# Patient Record
Sex: FEMALE | Race: WHITE | NOT HISPANIC OR LATINO | Employment: OTHER | URBAN - METROPOLITAN AREA
[De-identification: names, ages, dates, MRNs, and addresses within clinical notes are randomized per-mention and may not be internally consistent; named-entity substitution may affect disease eponyms.]

---

## 2018-06-11 ENCOUNTER — OFFICE VISIT (OUTPATIENT)
Dept: SPINE | Facility: CLINIC | Age: 48
End: 2018-06-11
Payer: MEDICARE

## 2018-06-11 VITALS
DIASTOLIC BLOOD PRESSURE: 68 MMHG | HEIGHT: 60 IN | BODY MASS INDEX: 22.19 KG/M2 | HEART RATE: 74 BPM | SYSTOLIC BLOOD PRESSURE: 100 MMHG | WEIGHT: 113 LBS

## 2018-06-11 DIAGNOSIS — M54.50 CHRONIC BILATERAL LOW BACK PAIN WITHOUT SCIATICA: Primary | ICD-10-CM

## 2018-06-11 DIAGNOSIS — M41.25 OTHER IDIOPATHIC SCOLIOSIS, THORACOLUMBAR REGION: ICD-10-CM

## 2018-06-11 DIAGNOSIS — G89.29 CHRONIC BILATERAL LOW BACK PAIN WITHOUT SCIATICA: Primary | ICD-10-CM

## 2018-06-11 PROCEDURE — 99204 OFFICE O/P NEW MOD 45 MIN: CPT | Mod: ,,, | Performed by: PHYSICAL MEDICINE & REHABILITATION

## 2018-06-11 RX ORDER — HYDROCODONE BITARTRATE AND ACETAMINOPHEN 10; 325 MG/1; MG/1
1 TABLET ORAL EVERY 8 HOURS PRN
COMMUNITY
End: 2018-06-15

## 2018-06-11 RX ORDER — TIZANIDINE 4 MG/1
4 TABLET ORAL EVERY 8 HOURS
COMMUNITY
End: 2018-06-15

## 2018-06-11 NOTE — PROGRESS NOTES
Subjective:       Patient ID: Alisson Eldridge is a 47 y.o. female.    Chief Complaint: Back Pain (Lumbar and thoracic. Prolotherapy Dr. Damion Serna, and epidurals by a doc in )    This is a 47-year-old woman who has no primary care physician in no significant past medical history.  She does however have a history of low back pain going back to childhood.  She has known scoliosis and I get the impression that she was wearing a brace up until she was about 20 years old.  Her current complaint is of primarily right-sided low back pain at the lumbosacral junction.  She has no vielka radicular symptoms no weakness no bowel or bladder dysfunction fever chills sweats or unexpected weight loss.  Review the years she has had physical therapy and various epidural steroid type injections none of which provided any lasting relief.  She saw Dr. Crespo in October of last year who discussed surgical options with her but ultimately referred her to Dr. Dominick Tony who is a pain management specialist.  I reviewed her drug database and I see that her last prescription for Norco was written by Dr. Tony on 4/15/2018.  Patient states that at its worst her pain is 8 out of 10 in intensity.      Review of Systems   Constitutional: Negative for chills, diaphoresis, fatigue, fever and unexpected weight change.   HENT: Negative for trouble swallowing.    Eyes: Negative for visual disturbance.   Respiratory: Negative for shortness of breath.    Cardiovascular: Negative for chest pain.   Gastrointestinal: Negative for abdominal pain, constipation, nausea and vomiting.   Genitourinary: Negative for difficulty urinating.   Musculoskeletal: Negative for arthralgias, back pain, gait problem, joint swelling, myalgias, neck pain and neck stiffness.   Neurological: Negative for dizziness, speech difficulty, weakness, light-headedness, numbness and headaches.       Objective:      Physical Exam   Constitutional: She is oriented to person, place,  and time. She appears well-developed and well-nourished.   Neurological: She is alert and oriented to person, place, and time.   She is awake and in no acute distress  Mild tenderness to palpation in the lumbar paraspinous musculature.  No external lesions or palpable masses  She can forward flex to about 90° before she complains of pain at the lumbosacral junction.  Extension causes discomfort at 15° at the lumbosacral junction.  He can heel and toe walk normally  Deep tendon reflexes are +1 at both knees and both ankles  Strength is normal in both lower extremities  X-rays of the lumbar spine done last year show rotation and scoliosis of the thoracic and lumbar spine.  MRI of the thoracic and lumbar spine show mild degenerative changes       Assessment:       1. Chronic bilateral low back pain without sciatica    2. Other idiopathic scoliosis, thoracolumbar region        Plan:         she has chronic back pain.  She has a nonfocal examination from a neurological standpoint and no historical red flags.  Obviously spinal surgery for her is elective.  She needs make that decision on her own whether she can live with her current level of pain or take the risk associated with surgical intervention.  Regardless I think she is better served to have a second surgical opinion.  I will refer her to Dr. Hastings

## 2018-06-15 ENCOUNTER — OFFICE VISIT (OUTPATIENT)
Dept: FAMILY MEDICINE | Facility: CLINIC | Age: 48
End: 2018-06-15
Payer: MEDICARE

## 2018-06-15 ENCOUNTER — DOCUMENTATION ONLY (OUTPATIENT)
Dept: FAMILY MEDICINE | Facility: CLINIC | Age: 48
End: 2018-06-15

## 2018-06-15 ENCOUNTER — HOSPITAL ENCOUNTER (OUTPATIENT)
Dept: RADIOLOGY | Facility: CLINIC | Age: 48
Discharge: HOME | End: 2018-06-15
Attending: PHYSICIAN ASSISTANT
Payer: MEDICARE

## 2018-06-15 ENCOUNTER — TELEPHONE (OUTPATIENT)
Dept: FAMILY MEDICINE | Facility: CLINIC | Age: 48
End: 2018-06-15

## 2018-06-15 VITALS
BODY MASS INDEX: 21.17 KG/M2 | SYSTOLIC BLOOD PRESSURE: 112 MMHG | HEIGHT: 60 IN | HEART RATE: 72 BPM | OXYGEN SATURATION: 99 % | TEMPERATURE: 98 F | WEIGHT: 107.81 LBS | DIASTOLIC BLOOD PRESSURE: 74 MMHG

## 2018-06-15 DIAGNOSIS — N64.4 BREAST PAIN: ICD-10-CM

## 2018-06-15 DIAGNOSIS — F17.200 SMOKER: ICD-10-CM

## 2018-06-15 DIAGNOSIS — Z86.79 HISTORY OF CHRONIC CHF: ICD-10-CM

## 2018-06-15 DIAGNOSIS — R09.89 CHRONIC SINUS COMPLAINTS: Primary | ICD-10-CM

## 2018-06-15 DIAGNOSIS — Z77.120 MOLD EXPOSURE: ICD-10-CM

## 2018-06-15 DIAGNOSIS — R09.89 CHRONIC SINUS COMPLAINTS: ICD-10-CM

## 2018-06-15 DIAGNOSIS — R63.6 UNDERWEIGHT: ICD-10-CM

## 2018-06-15 DIAGNOSIS — N63.0 BREAST MASS: ICD-10-CM

## 2018-06-15 DIAGNOSIS — R05.3 CHRONIC COUGH: ICD-10-CM

## 2018-06-15 PROCEDURE — 70220 X-RAY EXAM OF SINUSES: CPT | Mod: 26,,, | Performed by: RADIOLOGY

## 2018-06-15 PROCEDURE — 99204 OFFICE O/P NEW MOD 45 MIN: CPT | Mod: PBBFAC,PO,25 | Performed by: PHYSICIAN ASSISTANT

## 2018-06-15 PROCEDURE — 99205 OFFICE O/P NEW HI 60 MIN: CPT | Mod: S$PBB,,, | Performed by: PHYSICIAN ASSISTANT

## 2018-06-15 PROCEDURE — 70220 X-RAY EXAM OF SINUSES: CPT | Mod: TC,FY,PO

## 2018-06-15 PROCEDURE — 71046 X-RAY EXAM CHEST 2 VIEWS: CPT | Mod: TC,FY,PO

## 2018-06-15 PROCEDURE — 71046 X-RAY EXAM CHEST 2 VIEWS: CPT | Mod: 26,,, | Performed by: RADIOLOGY

## 2018-06-15 PROCEDURE — 99999 PR PBB SHADOW E&M-NEW PATIENT-LVL IV: CPT | Mod: PBBFAC,,, | Performed by: PHYSICIAN ASSISTANT

## 2018-06-15 RX ORDER — DOXYCYCLINE HYCLATE 100 MG
100 TABLET ORAL 2 TIMES DAILY
Qty: 20 TABLET | Refills: 0 | Status: SHIPPED | OUTPATIENT
Start: 2018-06-15 | End: 2018-07-06

## 2018-06-15 NOTE — TELEPHONE ENCOUNTER
Spoke to patient.  Patient was seen today in clinic patient states she forgot to tell you that she has been dizzy for the past week

## 2018-06-15 NOTE — TELEPHONE ENCOUNTER
We will follow-up with labs and see if there is a cause in the labs. Otherwise, she will need to be seen again to discuss.

## 2018-06-15 NOTE — PROGRESS NOTES
Pre-Visit Chart Review  For Appointment Scheduled on 06/15/18    Health Maintenance Due   Topic Date Due    Lipid Panel  1969    TETANUS VACCINE  08/20/1987    Pneumococcal PPSV23 (Medium Risk) (1) 08/20/1987    Pap Smear with HPV Cotest  08/20/1990    Mammogram  08/20/2009

## 2018-06-15 NOTE — PROGRESS NOTES
"Subjective:       Patient ID: Alisson Eldridge is a 47 y.o. female.    Chief Complaint: Sinus Problem    HPI   Patient is a 47 year old  female presenting to the clinic as a new patient with several issues. Patient has not seen a provider in several years (2010). She is a poor historian & her signficant other frequently speaks over her and for her. She did see Dr. Long last week for chronic low back pain that she reports is from "scoliosis and T12 inversion." She was referred to neurosurgeon for further evaluation.     Today, patient with complaint of chronic sinus issues >1 year. She has had waxing and waning headaches. She has been taking mucinex and benadryl without much relief. She feels left maxillary pressure & is having severe congestion in L nostril. She also endorses cough with phlegm production associated with hoarsness, SOB, wheezing. She reports subjective fever last night. She does report exposure to mold in their apartment.    Patient also complains of bilateral breast masses that she reports have been there for years, but have never been evaluated. She does report distant history of calcifications in her breasts. Her breasts are painful at times.   Review of Systems   Constitutional: Negative for activity change, appetite change, chills, diaphoresis, fatigue and fever.   HENT: Positive for congestion, sinus pain, sinus pressure and sore throat. Negative for postnasal drip and rhinorrhea.         Hoarseness   Respiratory: Positive for cough and shortness of breath. Negative for wheezing.    Cardiovascular: Negative.  Negative for chest pain.   Gastrointestinal: Negative for abdominal pain, blood in stool, constipation, diarrhea, nausea and vomiting.   Genitourinary: Negative for dysuria, frequency, hematuria and urgency.        Breast pain, breast masses   Musculoskeletal: Negative.    Skin: Negative.  Negative for color change and rash.   Neurological: Negative for dizziness and syncope. "   Psychiatric/Behavioral: Negative for agitation, behavioral problems and confusion.       Objective:      Physical Exam   Constitutional: Vital signs are normal. She appears well-developed and well-nourished. No distress.   HENT:   Head: Normocephalic and atraumatic.   Right Ear: Hearing, tympanic membrane, external ear and ear canal normal.   Left Ear: Hearing, tympanic membrane, external ear and ear canal normal.   Nose: Mucosal edema and rhinorrhea present. Right sinus exhibits maxillary sinus tenderness and frontal sinus tenderness. Left sinus exhibits maxillary sinus tenderness and frontal sinus tenderness.   Mouth/Throat: Uvula is midline and mucous membranes are normal. Posterior oropharyngeal erythema present.   Cardiovascular: Normal rate, regular rhythm, S1 normal, S2 normal and normal heart sounds.  Exam reveals no gallop.    No murmur heard.  Pulses:       Radial pulses are 2+ on the right side, and 2+ on the left side.   Pulmonary/Chest: Effort normal and breath sounds normal. No respiratory distress. She has no wheezes. She has no rhonchi.       Lymphadenopathy:        Head (right side): Tonsillar adenopathy present. No submental, no submandibular, no preauricular, no posterior auricular and no occipital adenopathy present.        Head (left side): Tonsillar adenopathy present. No submandibular, no preauricular, no posterior auricular and no occipital adenopathy present.   Skin: Skin is warm and dry. She is not diaphoretic.   Appropriate skin turgor   Psychiatric: Her behavior is normal. Judgment and thought content normal. Her mood appears anxious. Her speech is rapid and/or pressured. Cognition and memory are normal.       Assessment:       1. Chronic sinus complaints    2. Chronic cough    3. Mold exposure    4. History of chronic CHF    5. Underweight    6. Screening cholesterol level    7. Breast mass    8. Breast pain    9. Smoker        Plan:       Alisson was seen today for sinus  problem.    Diagnoses and all orders for this visit:    Chronic sinus complaints  -     X-Ray Sinuses Min 3 Views; Future    Chronic cough  -     X-Ray Chest PA And Lateral; Future    Mold exposure  -     X-Ray Chest PA And Lateral; Future  -     X-Ray Sinuses Min 3 Views; Future    History of chronic CHF  -     Brain natriuretic peptide; Future    Underweight  -     CBC auto differential; Future  -     Comprehensive metabolic panel; Future    Breast mass  -     Mammo Digital Diagnostic Bilateral; Future  -     US Breast Bilateral Limited; Future    Breast pain  -     Mammo Digital Diagnostic Bilateral; Future  -     US Breast Bilateral Limited; Future    Smoker

## 2018-06-15 NOTE — TELEPHONE ENCOUNTER
----- Message from Jeff Jin sent at 6/15/2018  2:02 PM CDT -----  Contact: self   Patient wanted to also inform nurse that she has been experiencing some dizzy spells,  please call back to discuss at [phone] (home)

## 2018-06-19 ENCOUNTER — TELEPHONE (OUTPATIENT)
Dept: FAMILY MEDICINE | Facility: CLINIC | Age: 48
End: 2018-06-19

## 2018-06-19 ENCOUNTER — HOSPITAL ENCOUNTER (OUTPATIENT)
Dept: RADIOLOGY | Facility: HOSPITAL | Age: 48
Discharge: HOME | End: 2018-06-19
Attending: PHYSICIAN ASSISTANT
Payer: MEDICARE

## 2018-06-19 DIAGNOSIS — N63.0 BREAST MASS: ICD-10-CM

## 2018-06-19 DIAGNOSIS — D64.9 ANEMIA, UNSPECIFIED TYPE: Primary | ICD-10-CM

## 2018-06-19 DIAGNOSIS — N64.4 BREAST PAIN: ICD-10-CM

## 2018-06-19 DIAGNOSIS — R42 DIZZINESS: ICD-10-CM

## 2018-06-19 PROCEDURE — 77062 BREAST TOMOSYNTHESIS BI: CPT | Mod: 26,,, | Performed by: RADIOLOGY

## 2018-06-19 PROCEDURE — 76642 ULTRASOUND BREAST LIMITED: CPT | Mod: 26,50,, | Performed by: RADIOLOGY

## 2018-06-19 PROCEDURE — 77066 DX MAMMO INCL CAD BI: CPT | Mod: 26,,, | Performed by: RADIOLOGY

## 2018-06-19 PROCEDURE — 76642 ULTRASOUND BREAST LIMITED: CPT | Mod: TC,50

## 2018-06-19 PROCEDURE — 77066 DX MAMMO INCL CAD BI: CPT | Mod: TC

## 2018-06-19 NOTE — TELEPHONE ENCOUNTER
----- Message from KAROL Dos Santos sent at 6/19/2018  9:01 AM CDT -----  Electrolytes normal. Lab shows no evidence of fluid overload.   She is slightly anemic. I suggest she come back in for further labs- iron studies, urinalysis, hemoccult x 3.

## 2018-06-20 ENCOUNTER — TELEPHONE (OUTPATIENT)
Dept: FAMILY MEDICINE | Facility: CLINIC | Age: 48
End: 2018-06-20

## 2018-06-20 RX ORDER — METHYLPREDNISOLONE 4 MG/1
TABLET ORAL
Qty: 1 PACKAGE | Refills: 0 | Status: SHIPPED | OUTPATIENT
Start: 2018-06-20 | End: 2018-07-06

## 2018-06-20 NOTE — TELEPHONE ENCOUNTER
She needs to complete antibitoic. I would like her to add medrol dose pack as directed. She also needs to start mucinex twice daily with increased water intake. Schedule f/u in 2 weeks.

## 2018-06-20 NOTE — TELEPHONE ENCOUNTER
----- Message from KAROL Dos Santos sent at 6/20/2018  8:20 AM CDT -----  Your mammogram is normal.   Repeat your mammogram yearly.

## 2018-06-23 ENCOUNTER — TELEPHONE (OUTPATIENT)
Dept: FAMILY MEDICINE | Facility: CLINIC | Age: 48
End: 2018-06-23

## 2018-06-23 NOTE — TELEPHONE ENCOUNTER
----- Message from Florencia Hamilton RN sent at 6/21/2018  5:26 PM CDT -----  Contact: CORINNE LOPEZ [[id number]]      ----- Message -----  From: Dash Patrick  Sent: 6/21/2018   4:12 PM  To: Julien Condon Staff ([facility])      Name of Who is Calling: CORINNE LOPEZ [[id number]]      What is the request in detail: Patient would like to speak with staff in regards to hot flash medication is not working. Please advise on what to do      Can the clinic reply by [facility]: yes      What Number to Call Back if not in [facility]: [phone]

## 2018-06-25 ENCOUNTER — LAB VISIT (OUTPATIENT)
Dept: LAB | Facility: HOSPITAL | Age: 48
End: 2018-06-25
Attending: PHYSICIAN ASSISTANT
Payer: MEDICARE

## 2018-06-25 DIAGNOSIS — R42 DIZZINESS: ICD-10-CM

## 2018-06-25 DIAGNOSIS — D64.9 ANEMIA, UNSPECIFIED TYPE: ICD-10-CM

## 2018-06-25 LAB
FERRITIN SERPL-MCNC: 8 NG/ML
IRON SERPL-MCNC: 65 UG/DL
SATURATED IRON: 13 %
TOTAL IRON BINDING CAPACITY: 485 UG/DL
TRANSFERRIN SERPL-MCNC: 328 MG/DL

## 2018-06-25 PROCEDURE — 83540 ASSAY OF IRON: CPT

## 2018-06-25 PROCEDURE — 82728 ASSAY OF FERRITIN: CPT

## 2018-06-25 PROCEDURE — 36415 COLL VENOUS BLD VENIPUNCTURE: CPT | Mod: PO

## 2018-06-25 NOTE — TELEPHONE ENCOUNTER
Patient is complaining of hot flashes since last week. Patient states she has one day left of steroids. Patient will complete. Notified patient if she is still have hot flashes after completing she will need appointment to be seen patient states understanding

## 2018-06-26 DIAGNOSIS — D50.9 IRON DEFICIENCY ANEMIA, UNSPECIFIED IRON DEFICIENCY ANEMIA TYPE: Primary | ICD-10-CM

## 2018-07-05 ENCOUNTER — DOCUMENTATION ONLY (OUTPATIENT)
Dept: FAMILY MEDICINE | Facility: CLINIC | Age: 48
End: 2018-07-05

## 2018-07-06 ENCOUNTER — OFFICE VISIT (OUTPATIENT)
Dept: FAMILY MEDICINE | Facility: CLINIC | Age: 48
End: 2018-07-06
Payer: MEDICARE

## 2018-07-06 VITALS
WEIGHT: 104.75 LBS | SYSTOLIC BLOOD PRESSURE: 114 MMHG | TEMPERATURE: 98 F | DIASTOLIC BLOOD PRESSURE: 70 MMHG | HEIGHT: 60 IN | HEART RATE: 73 BPM | BODY MASS INDEX: 20.56 KG/M2

## 2018-07-06 DIAGNOSIS — J32.9 CHRONIC CONGESTION OF PARANASAL SINUS: Primary | ICD-10-CM

## 2018-07-06 DIAGNOSIS — Z12.4 CERVICAL CANCER SCREENING: ICD-10-CM

## 2018-07-06 DIAGNOSIS — Z87.81 HISTORY OF CLOSED FRACTURE OF NASAL BONES: ICD-10-CM

## 2018-07-06 DIAGNOSIS — R09.82 POST-NASAL DRIP: ICD-10-CM

## 2018-07-06 LAB
CTP QC/QA: YES
FECAL OCCULT BLOOD, POC: NEGATIVE

## 2018-07-06 PROCEDURE — 99214 OFFICE O/P EST MOD 30 MIN: CPT | Mod: PBBFAC,PO | Performed by: PHYSICIAN ASSISTANT

## 2018-07-06 PROCEDURE — 99999 PR PBB SHADOW E&M-EST. PATIENT-LVL IV: CPT | Mod: PBBFAC,,, | Performed by: PHYSICIAN ASSISTANT

## 2018-07-06 PROCEDURE — 99214 OFFICE O/P EST MOD 30 MIN: CPT | Mod: S$PBB,,, | Performed by: PHYSICIAN ASSISTANT

## 2018-07-06 RX ORDER — IPRATROPIUM BROMIDE 42 UG/1
2 SPRAY, METERED NASAL 4 TIMES DAILY
Qty: 15 ML | Refills: 5 | Status: SHIPPED | OUTPATIENT
Start: 2018-07-06 | End: 2018-08-15 | Stop reason: ALTCHOICE

## 2018-07-06 NOTE — PROGRESS NOTES
"Subjective:       Patient ID: Alisson Eldridge is a 47 y.o. female.    Chief Complaint: Follow-up    HPI   Patient is a 47 year old  female presenting to the clinic for follow-up.  1) Chronic sinus congestion- s/p doxycycline & medrol dose pack without improvement. She has been using mucinex for a year without any relief. She reports history of left nasal fracture. She also endorses chronic PND. Has not tried any nasal sprays.  2) Anemia- looks to be SHANELL; Started oral ferrous sulfate 325mg daily with vitamin C; states she cannnot tolerate twice daily dosing 2/2 headache; f/u labs in 3 months scheduled  3) Chronic low back pain- reports history of "T12 inversion" & scoliosis. Most recently evaluated via Dr. Long whom referred to neurosurgery (this was never set up). She reports history of going to pain management (Dr. Tony) whom has tried injections previously.  Review of Systems   Constitutional: Negative for activity change, appetite change, chills, diaphoresis, fatigue and fever.   HENT: Positive for postnasal drip, sinus pain and sinus pressure. Negative for congestion and rhinorrhea.    Respiratory: Negative.  Negative for cough, shortness of breath, wheezing and stridor.    Cardiovascular: Negative.  Negative for chest pain, palpitations and leg swelling.   Gastrointestinal: Negative for abdominal pain, blood in stool, constipation, diarrhea, nausea and vomiting.   Genitourinary: Negative for dysuria, frequency, hematuria and urgency.   Musculoskeletal: Negative.    Skin: Negative.  Negative for color change and rash.   Neurological: Negative for dizziness and syncope.   Psychiatric/Behavioral: Negative for agitation, behavioral problems and confusion.       Objective:      Physical Exam   Constitutional: Vital signs are normal. She appears well-developed and well-nourished. No distress.   Cardiovascular: Normal rate, regular rhythm, S1 normal, S2 normal and normal heart sounds.  Exam reveals " no gallop.    No murmur heard.  Pulses:       Radial pulses are 2+ on the right side, and 2+ on the left side.   Pulmonary/Chest: Effort normal and breath sounds normal. No respiratory distress. She has no wheezes. She has no rhonchi.   Skin: Skin is warm and dry. She is not diaphoretic.   Psychiatric: She has a normal mood and affect. Her speech is normal and behavior is normal. Judgment and thought content normal. Cognition and memory are normal.       Assessment:       1. Chronic congestion of paranasal sinus    2. History of closed fracture of nasal bones    3. Post-nasal drip    4. Cervical cancer screening    5. Screening cholesterol level        Plan:       Alisson was seen today for follow-up.    Diagnoses and all orders for this visit:    Chronic congestion of paranasal sinus  -     Ambulatory referral to ENT  -     ipratropium (ATROVENT) 42 mcg (0.06 %) nasal spray; 2 sprays by Nasal route 4 (four) times daily.    History of closed fracture of nasal bones  -     Ambulatory referral to ENT    Post-nasal drip  -     Ambulatory referral to ENT  -     ipratropium (ATROVENT) 42 mcg (0.06 %) nasal spray; 2 sprays by Nasal route 4 (four) times daily.    Cervical cancer screening  -     Ambulatory referral to Obstetrics / Gynecology

## 2018-08-15 ENCOUNTER — OFFICE VISIT (OUTPATIENT)
Dept: OTOLARYNGOLOGY | Facility: CLINIC | Age: 48
End: 2018-08-15
Payer: MEDICARE

## 2018-08-15 VITALS
HEIGHT: 60 IN | WEIGHT: 107.13 LBS | SYSTOLIC BLOOD PRESSURE: 96 MMHG | HEART RATE: 68 BPM | DIASTOLIC BLOOD PRESSURE: 66 MMHG | BODY MASS INDEX: 21.03 KG/M2

## 2018-08-15 DIAGNOSIS — H90.3 ASYMMETRICAL SENSORINEURAL HEARING LOSS: ICD-10-CM

## 2018-08-15 DIAGNOSIS — Z86.69 HISTORY OF MIGRAINE HEADACHES: ICD-10-CM

## 2018-08-15 DIAGNOSIS — R09.89 CHRONIC THROAT CLEARING: ICD-10-CM

## 2018-08-15 DIAGNOSIS — H91.90 HEARING DIFFICULTY, UNSPECIFIED LATERALITY: Primary | ICD-10-CM

## 2018-08-15 DIAGNOSIS — K21.9 LPRD (LARYNGOPHARYNGEAL REFLUX DISEASE): ICD-10-CM

## 2018-08-15 DIAGNOSIS — D33.3 LEFT ACOUSTIC NEUROMA: ICD-10-CM

## 2018-08-15 DIAGNOSIS — R05.3 CHRONIC COUGH: ICD-10-CM

## 2018-08-15 DIAGNOSIS — J34.3 HYPERTROPHY OF BOTH INFERIOR NASAL TURBINATES: ICD-10-CM

## 2018-08-15 DIAGNOSIS — H61.23 BILATERAL IMPACTED CERUMEN: ICD-10-CM

## 2018-08-15 DIAGNOSIS — Z72.0 TOBACCO ABUSE: Primary | ICD-10-CM

## 2018-08-15 DIAGNOSIS — J34.2 NASAL SEPTAL DEVIATION: ICD-10-CM

## 2018-08-15 PROCEDURE — 99204 OFFICE O/P NEW MOD 45 MIN: CPT | Mod: 25,S$PBB,, | Performed by: NURSE PRACTITIONER

## 2018-08-15 PROCEDURE — 31575 DIAGNOSTIC LARYNGOSCOPY: CPT | Mod: S$PBB,,, | Performed by: NURSE PRACTITIONER

## 2018-08-15 PROCEDURE — 99214 OFFICE O/P EST MOD 30 MIN: CPT | Mod: PBBFAC,PO | Performed by: NURSE PRACTITIONER

## 2018-08-15 PROCEDURE — G0268 REMOVAL OF IMPACTED WAX MD: HCPCS | Mod: PBBFAC,PO | Performed by: NURSE PRACTITIONER

## 2018-08-15 PROCEDURE — 31575 DIAGNOSTIC LARYNGOSCOPY: CPT | Mod: PBBFAC,PO | Performed by: NURSE PRACTITIONER

## 2018-08-15 PROCEDURE — 99999 PR PBB SHADOW E&M-EST. PATIENT-LVL IV: CPT | Mod: PBBFAC,,, | Performed by: NURSE PRACTITIONER

## 2018-08-15 PROCEDURE — G0268 REMOVAL OF IMPACTED WAX MD: HCPCS | Mod: S$PBB,51,, | Performed by: NURSE PRACTITIONER

## 2018-08-15 RX ORDER — AZELASTINE 1 MG/ML
1 SPRAY, METERED NASAL 2 TIMES DAILY
Qty: 30 ML | Refills: 12 | Status: SHIPPED | OUTPATIENT
Start: 2018-08-15 | End: 2019-01-11

## 2018-08-15 RX ORDER — FLUTICASONE PROPIONATE 50 MCG
1 SPRAY, SUSPENSION (ML) NASAL 2 TIMES DAILY
Qty: 16 G | Refills: 12 | Status: SHIPPED | OUTPATIENT
Start: 2018-08-15 | End: 2019-01-11

## 2018-08-15 NOTE — PROGRESS NOTES
"Subjective:       Patient ID: Alisson Eldridge is a 47 y.o. female.    Chief Complaint: Nasal Congestion    HPI   Patient is referred by YUSUF Victoria for consultation for chronic nasal congestion. She broke her nose approx nine years ago and ever since has been unable to breathe through the left side of her nose. Patient has been smoking since she was 13 years old, 1 ppd (she quit during each of her pregnancies). Besides chronic nasal congestion, she also reports chronic cough/throat clearing. She reports mold in her apartment, and states she will be moving shortly. She had allergy testing done in 1997 and was found to be allergic to multiple inhalant allergens. Sinus xrays were negative for sinusitis two months ago; treated with doxycycline two months ago for sinusitis. She reports scoliosis and lifelong migraines.   Patient was sent to [facility] at age 6 for suspected hearing loss. Audiogram at age 6 showed normal hearing AD, moderate to profound SNHL AS, type "A" tympanograms AU.     Review of Systems   Constitutional: Negative.    HENT: Positive for congestion, postnasal drip, sneezing, sore throat and voice change. Negative for dental problem, facial swelling, rhinorrhea and trouble swallowing.         Sensation of thick or too much mucus in the back of her throat  Frequent throat irritation  Frequent throat clearing  Frequent cough  Sensation of lump or swelling in the back of her throat   Eyes: Negative.  Negative for discharge, redness and itching.   Respiratory: Positive for cough. Negative for choking.    Cardiovascular: Negative.    Gastrointestinal: Negative.    Musculoskeletal: Negative.    Skin: Negative.    Neurological: Positive for headaches.   Hematological: Negative.    Psychiatric/Behavioral: Negative.        Objective:      Physical Exam   Constitutional: She is oriented to person, place, and time. Vital signs are normal. She appears well-developed and well-nourished. She is cooperative. She " does not appear ill. No distress.   HENT:   Head: Normocephalic and atraumatic.   Right Ear: Hearing, tympanic membrane, external ear and ear canal normal. Tympanic membrane is not erythematous. No middle ear effusion.   Left Ear: Hearing, tympanic membrane, external ear and ear canal normal. Tympanic membrane is not erythematous.  No middle ear effusion.   Nose: Mucosal edema (left inferior turbinate hypertrophy, responded well to vasoconstrictor decongestant) and septal deviation (mildly to left) present. No rhinorrhea. Right sinus exhibits no maxillary sinus tenderness and no frontal sinus tenderness. Left sinus exhibits no maxillary sinus tenderness and no frontal sinus tenderness.   Mouth/Throat: Uvula is midline, oropharynx is clear and moist and mucous membranes are normal. Mucous membranes are not pale, not dry and not cyanotic. She has dentures. No oral lesions. No oropharyngeal exudate, posterior oropharyngeal edema or posterior oropharyngeal erythema.     SEPARATE PROCEDURE IN OFFICE:   Procedure: Removal of impacted cerumen, bilateral   Pre Procedure Diagnosis: Cerumen Impaction   Post Procedure Diagnosis: Cerumen Impaction   Verbal informed consent in regards to risk of trauma to ear canal, ear drum or hearing, discomfort during procedure and/or inability to remove cerumen impaction in one session or unforeseen events or complications.   No anesthesia.     Procedure in detail:   Ear canal visualized bilateral with appropriate size ear speculum utilizing Operating Head Binocular Otomicroscope   Utilizing the following:  Ring curet, alligator forceps, and/or suction cannula was used. The impacted cerumen of the ear canals was removed atraumatically. The TM and EAC were then inspected and found to be clear of wax. See description of TMs/EACs in PE above.   Complications: No   Condition: Improved/Good     Eyes: Conjunctivae, EOM and lids are normal. Pupils are equal, round, and reactive to light. Right eye  exhibits no discharge. Left eye exhibits no discharge. No scleral icterus.   Neck: Trachea normal and normal range of motion. Neck supple. No tracheal deviation present. No thyroid mass and no thyromegaly present.   Cardiovascular: Normal rate.   Pulmonary/Chest: Effort normal. No stridor. No respiratory distress. She has no wheezes.   Musculoskeletal: Normal range of motion.   Lymphadenopathy:        Head (right side): No submental, no submandibular, no tonsillar, no preauricular and no posterior auricular adenopathy present.        Head (left side): No submental, no submandibular, no tonsillar, no preauricular and no posterior auricular adenopathy present.     She has no cervical adenopathy.        Right cervical: No superficial cervical and no posterior cervical adenopathy present.       Left cervical: No superficial cervical and no posterior cervical adenopathy present.   Neurological: She is alert and oriented to person, place, and time. She has normal strength. Coordination and gait normal.   Skin: Skin is warm, dry and intact. No lesion and no rash noted. She is not diaphoretic. No cyanosis. No pallor.   Psychiatric: She has a normal mood and affect. Her speech is normal and behavior is normal. Judgment and thought content normal. Cognition and memory are normal.   Nursing note and vitals reviewed.      Procedure: Flexible laryngoscopy    In order to fully examine the upper aerodigestive tract, including the larynx, in a patient with a hyperactive gag reflex, and suboptimal visualization with indirect mirror exam,  flexible endoscopy is required.   After explaining the procedure and obtaining verbal consent, a timeout was performed with the patient's participation according to the universal protocol. Both nasal cavities were anesthetized with 4% Xylocaine spray mixed with Harjinder-Synephrine. The flexible laryngoscope  was inserted into the nasal cavity and advanced to visualize the nasal cavity, nasopharynx, the  posterior oropharynx, hypopharynx, and the endolarynx with the  findings noted. The scope was removed and the procedure terminated. The patient tolerated this procedure well without apparent complication.     OVERALL FINDINGS  Nasopharynx - the torus is clear. There are no lesions of the posterior wall.   Oropharynx - no lesions of the tongue base. There is no obvious fullness or asymmetry.  Hypopharynx - there are no lesions of the pyriform sinuses or postcricoid region   Larynx - there are no lesions of the supraglottic or glottic larynx.  Vocal fold mobility is normal.     SPECIFIC FINDINGS  Adenoid tissue - normal   Nasopharynx & eustachian tube orifices - normal   Posterior pharyngeal wall - normal   Base of tongue - normal   Epiglottis - normal   Valleculae - normal   Pyriform sinuses - normal   False vocal cords - normal   True vocal cords - normal  Arytenoids - normal   Interarytenoid space - erythema, edema   Left middle turbinate    Left Choana  Right middle turbinate    Right Choana    Base of Tongue    Larynx    Larynx    Assessment:     Mildly deviated septum to left    Inferior turbinate hypertrophy L>R  Tobacco abuse  LPRD  Bilateral cerumen impactions removed  Asymmetrical SNHL AU  Plan:     Flonase & Astelin BID X 2 months. If still unsatisfied with nasal breathing, may consider seeing an ENT surgeon to discuss possible septo/SMRTs.     Referral to our smoking cessation clinic entered.   Advised/Cautioned: The results of today's ENT exam and flexible endoscopy were detailed to the patient and her questions were answered. Patient education centered around GERD, known exacerbants and contemporary treatment options. Laryngoscope photos were given to the patient. Handouts given on LPRD and GERD were given to the patient:  once she has completed the evening meal to not snack or consume any other food products or caffeinated beverages for at least  minutes before retiring; sleep about 30 degrees  above horizontal, and this can be facilitated by using 2-3 pillows or a wedge foam product. Consultation with gastroenterology may be indicated to rule out intrinsic disease in the lower esophagus, stomach, or proximal duodenum.   MRI brain with gadolinium per [facility]AC protocol for asymmetrical sensorineural hearing loss to rule out retrocochlear pathology: acoustic schwannoma or cerebellar pontine angle mass. We will call patient with results as soon as available.  PATIENT IS MEDICALLY CLEARED FOR HEARING AIDS.   Today's audiogram reveals the patient is a candidate for amplification. Audiogram is reviewed in detail with the patient. The audiologist's recommendation that the patient have amplification/hearing aids is discussed and questions answered. Patient has been given information by the audiologist on how to schedule a hearing aid consultation. Patient is encouraged to wear ear protection in loud noise and return annually for hearing test. Return to clinic as needed for further ENT concerns.  [facility] hearing aid recommended: list of places that offer financial assistance given to patient by audiologist.

## 2018-08-15 NOTE — PATIENT INSTRUCTIONS
If your sinuses are involved (contributing to your cough/throat clearing), then ENT will resolve.  However if your sinuses are shown to be open and clear on imaging, then depending on your other associated symptoms, the best specialist to resolve your cough/throat clearing may be pulmonologist, allergist, or gastroenterologist.     1. Nasal allergies -- Typical constellation of symptoms seen with nasal allergies: itchy, red, watery eyes; itchy, red, watery nose; excessive sneezing; excessive stuffiness. Discuss with your primary care provider whether you should see an allergist or take daily allergy medications.     2. Silent reflux -- Typical constellation of symptoms seen with silent reflux: post-nasal drip sensation with absence of significant runny nose or nasal congestion, sensation of thick or too much mucus in the back of throat, raspy voice, frequent throat clearing, lump in the back of throat, frequent sore throats. Discuss with your primary care provider whether you should see a gastroenterologist or take daily reflux medications.     3. Sinus Infection -- Typical constellation of symptoms seen with acute bacterial sinus infection are:  Green-gold, foul-smelling, foul-tasting mucus from nose and throat, inability to breathe through nose, inability to smell or taste well, facial pain and swelling, dental pain, headaches around eyes, sore throat and productive cough. Sinus imaging may be needed to rule out infection if these symptoms are present.    4. Pulmonary (Lung) issue -- Discuss with your primary care provider whether you should see a pulmonologist (lung specialist).    5. Certain blood pressure medications known as ACE-inhibitors, such as lisinopril, can cause chronic cough. Though estimates vary in literature, 20% or more of patients taking lisinopril (or other ACE-inhibitor for blood pressure) will develop a chronic dry cough.       How Acid Reflux Affects Your Throat    Do you have to clear your  "throat or cough often? Are you hoarse? Do you have trouble swallowing? If you have these or other throat symptoms, you may have acid reflux. This occurs when stomach acid flows back up and irritates your throat.  Why you have throat symptoms  There are muscles (esophageal sphincters) at both ends of the tube that carries food to your stomach (the esophagus). These muscles relax to let food pass. Then they tighten to keep stomach acid down. When the lower esophageal sphincter (LES) doesnt tighten enough, acid can flow back (reflux) from your stomach into your esophagus. This may cause heartburn. In some cases the upper esophageal sphincter (UES) also doesnt work well. Then acid can travel higher and enter your throat (pharynx). In many cases, this causes throat symptoms.  Common throat symptoms  · Need to clear your throat often  · Feeling like youre choking  · Long-term (chronic) cough  · Hoarseness  · Trouble swallowing  · Feel like you have a lump in your throat  · Sour or acid taste  · Sore throat that keeps coming back     LARYNGOPHARYNGEAL REFLUX  (SILENT OR ATYPICAL REFLUX)    If you have any of the following symptoms you may have laryngopharyngeal reflux (LPR):  hoarseness, thick or too much mucus, chronic throat pain/irritation, chronic throat clearing, chronic cough, especially cough that wake you up from sleep, chronic "postnasal drip" without the need to blow your nose.     Many people with LPR do not have symptoms of heartburn. Compared to the esophagus, the voice box and the back of the throat are significantly more sensitive to the effects of acid on surrounding tissue. Acid passing quickly through the esophagus does not have a chance to irritate the area for too long.  However acid that pools in the throat or voice box can cause prolonged irritation resulting in the symptoms of LPR.    The symptoms of LPR can consist of a dry cough and the sensation of something being stuck in the throat.  Some " "people will complain of heartburn while others may have intermittent hoarseness or loss of voice.  Another major symptom of LPR is "postnasal drip."  Patients are often told symptoms are due to abnormal nasal drainage or sinus infection; however this is rarely the cause of chronic throat irritation. For post nasal drip to cause the complaints described, signs and symptoms of an active nasal infection should be present.     Treatments for LPR include:  postural changes, weight reduction, diet modification, medication to reduce stomach acid and promote normal motility, and surgery to prevent reflux. Most patients will begin to notice some relief in her symptoms about 2 weeks after starting the medication; however it is generally recommended the medication should be continued for 2 months. If the symptoms completely resolve, the medication can then be tapered.  Some people will remain symptom free while others may have relapses which required treatment again.    Things you can do to prevent reflux include:  Do not smoke.  Smoking will cause reflux.  Avoid tight fitting clothes or belts around the waist.  Avoid eating at least 2 hours prior to bedtime.  In fact avoid eating your largest meal at night.  Weight loss.  For patient's with recent weight gain, shedding a few pounds is all that is required to improve reflux.  Avoid caffeine, cola beverages, citrus beverages, mints, alcoholic beverages, particularly at night, cheese, fried foods, spicy foods, eggs, and chocolate.  Sleep with the head of bed elevated at least 6 inches.    Recommendations:    Take Nexium or Prilosec (PPI) every morning on an empty stomach (30-60 minutes before eating) 40 MG.   At bedtime take Zantac (H2-blocker) 150-300 mg.    After 4-8 weeks, with significant symptomatic improvement, you may begin weaning your reflux medications down:  Nexium or Prilosec 40 mg --> to 20 mg (over-the-counter strength)  Zantac 300 mg --> to 150 mg " (over-the-counter stength)  Wean as tolerated.   See a Gastro doctor (GI) for refractory symptoms and continued management.

## 2018-09-07 ENCOUNTER — OFFICE VISIT (OUTPATIENT)
Dept: FAMILY MEDICINE | Facility: CLINIC | Age: 48
End: 2018-09-07
Payer: MEDICARE

## 2018-09-07 VITALS
DIASTOLIC BLOOD PRESSURE: 76 MMHG | HEART RATE: 76 BPM | WEIGHT: 108 LBS | BODY MASS INDEX: 21.2 KG/M2 | HEIGHT: 60 IN | SYSTOLIC BLOOD PRESSURE: 118 MMHG

## 2018-09-07 DIAGNOSIS — Z01.419 ENCOUNTER FOR WELL WOMAN EXAM: Primary | ICD-10-CM

## 2018-09-07 PROCEDURE — 99999 PR PBB SHADOW E&M-EST. PATIENT-LVL III: CPT | Mod: PBBFAC,,, | Performed by: NURSE PRACTITIONER

## 2018-09-07 PROCEDURE — 88175 CYTOPATH C/V AUTO FLUID REDO: CPT

## 2018-09-07 PROCEDURE — G0101 CA SCREEN;PELVIC/BREAST EXAM: HCPCS | Mod: S$PBB,,, | Performed by: NURSE PRACTITIONER

## 2018-09-07 PROCEDURE — 99213 OFFICE O/P EST LOW 20 MIN: CPT | Mod: PBBFAC,PO | Performed by: NURSE PRACTITIONER

## 2018-09-11 NOTE — PROGRESS NOTES
Chief Complaint   Patient presents with    Well Woman       History of Present Illness: Alisson Eldridge is a 48 y.o. female that presents today 2018 for well gyn visit.  Pt presents today for well woman exam. ; vaginal. Pt reports having an abnormal pap smear in  with a colposcopy. She reports that since this incident she has not had any other abnormal pap smear. She reports once monthly cycles with no associated complaints. She does not desire any STD testing. Pt had her mammogram screening in . No other complaints or concerns noted.         Past Medical History:   Diagnosis Date    Allergy     Asthma     CHF (congestive heart failure)     Chronic back pain     Depression     History of psychiatric care     History of psychiatric hospitalization     Psychiatric exam requested by authority     Psychiatric problem     Scoliosis     Therapy        Past Surgical History:   Procedure Laterality Date    APPENDECTOMY      COLPOSCOPY      MULTIPLE TOOTH EXTRACTIONS      TUBAL LIGATION         Family History   Adopted: Yes   Problem Relation Age of Onset    Breast cancer Mother     Breast cancer Maternal Grandmother        Social History     Socioeconomic History    Marital status:      Spouse name: None    Number of children: None    Years of education: None    Highest education level: None   Social Needs    Financial resource strain: None    Food insecurity - worry: None    Food insecurity - inability: None    Transportation needs - medical: None    Transportation needs - non-medical: None   Occupational History    None   Tobacco Use    Smoking status: Current Every Day Smoker     Packs/day: 1.00     Types: Cigarettes    Smokeless tobacco: Never Used   Substance and Sexual Activity    Alcohol use: No    Drug use: No    Sexual activity: Yes     Partners: Male     Birth control/protection: None   Other Topics Concern    Patient feels they ought to cut down on  drinking/drug use No    Patient annoyed by others criticizing their drinking/drug use Yes    Patient has felt bad or guilty about drinking/drug use No    Patient has had a drink/used drugs as an eye opener in the AM No   Social History Narrative    None       OB History    Para Term  AB Living   3 2 2   1     SAB TAB Ectopic Multiple Live Births   1              # Outcome Date GA Lbr Kahlil/2nd Weight Sex Delivery Anes PTL Lv   3 SAB            2 Term            1 Term                   Current Outpatient Medications   Medication Sig Dispense Refill    azelastine (ASTELIN) 137 mcg (0.1 %) nasal spray 1 spray (137 mcg total) by Nasal route 2 (two) times daily. 30 mL 12    fluticasone (FLONASE) 50 mcg/actuation nasal spray 1 spray (50 mcg total) by Each Nare route 2 (two) times daily. 16 g 12    pantoprazole (PROTONIX) 40 MG tablet Take 1 tablet (40 mg total) by mouth once daily. 30 tablet 0    trazodone (DESYREL) 50 MG tablet Take 1 tablet (50 mg total) by mouth every evening. 30 tablet 0     No current facility-administered medications for this visit.        Review of patient's allergies indicates:   Allergen Reactions    Codeine Nausea And Vomiting    Skelaxin [metaxalone] Other (See Comments)     Burning in throat    Zoloft [sertraline] Rash       Review of Symptoms:  GENERAL: Denies weight gain or weight loss. Feeling well overall.   SKIN: Denies rash or lesions.   HEAD: Denies head injury or headache.   ABDOMEN: No abdominal pain, constipation, diarrhea, nausea, vomiting or rectal bleeding.   URINARY: No frequency, dysuria, hematuria, or burning on urination.    /76   Pulse 76   Ht 5' (1.524 m)   Wt 49 kg (108 lb 0.4 oz)   LMP 2018     Physical Exam:  APPEARANCE: Well nourished, well developed, in no acute distress.  SKIN: Normal skin turgor, no lesions.  RESPIRATORY: Normal respiratory effort with no retractions or use of accessory muscles  ABDOMEN: Soft. No tenderness or  masses. No hepatosplenomegaly. No hernias.  BREASTS: Symmetrical, no skin changes or visible lesions. No palpable masses, nipple discharge or adenopathy bilaterally.  PELVIC: Normal external female genitalia without lesions. Normal hair distribution. Adequate perineal body, normal urethral meatus. Urethra with no masses.  Bladder nontender. Vagina moist and well rugated without lesions or discharge. Cervix pink and without lesions. No significant cystocele or rectocele. Bimanual exam showed uterus normal size, shape, position, mobile and nontender. Adnexa without masses or tenderness. Urethra and bladder normal. PAP DONE      ASSESSMENT/PLAN:  Encounter for well woman exam  -     Liquid-based pap smear, screening          Patient was counseled today on Pap guidelines, recommendation for pelvic exams, mammograms starting annually at age 40, Colonoscopy after the age of 50, Dexa Bone Scan and calcium and vitamin D supplementation in menopause and to see her PCP for other health maintenance.       Follow-up:  RTC in 1 year for well woman exam or as needed

## 2018-09-12 ENCOUNTER — DOCUMENTATION ONLY (OUTPATIENT)
Dept: FAMILY MEDICINE | Facility: CLINIC | Age: 48
End: 2018-09-12

## 2018-09-12 NOTE — PROGRESS NOTES
Pre-Visit Chart Review  For Appointment Scheduled on 9/17/2018    Health Maintenance Due   Topic Date Due    Lipid Panel  1970    TETANUS VACCINE  08/20/1987    Pneumococcal PPSV23 (Medium Risk) (1) 08/20/1987    Pap Smear with HPV Cotest  08/20/1990    Influenza Vaccine  08/01/2018

## 2018-09-17 ENCOUNTER — LAB VISIT (OUTPATIENT)
Dept: LAB | Facility: HOSPITAL | Age: 48
End: 2018-09-17
Attending: PHYSICIAN ASSISTANT
Payer: MEDICARE

## 2018-09-17 ENCOUNTER — TELEPHONE (OUTPATIENT)
Dept: FAMILY MEDICINE | Facility: CLINIC | Age: 48
End: 2018-09-17

## 2018-09-17 ENCOUNTER — HOSPITAL ENCOUNTER (OUTPATIENT)
Dept: RADIOLOGY | Facility: HOSPITAL | Age: 48
Discharge: HOME | End: 2018-09-17
Attending: NURSE PRACTITIONER
Payer: MEDICARE

## 2018-09-17 DIAGNOSIS — D33.3 LEFT ACOUSTIC NEUROMA: ICD-10-CM

## 2018-09-17 DIAGNOSIS — H90.3 ASYMMETRICAL SENSORINEURAL HEARING LOSS: ICD-10-CM

## 2018-09-17 DIAGNOSIS — D33.3 BENIGN NEOPLASM OF CRANIAL NERVES: ICD-10-CM

## 2018-09-17 DIAGNOSIS — D50.9 IRON DEFICIENCY ANEMIA, UNSPECIFIED IRON DEFICIENCY ANEMIA TYPE: ICD-10-CM

## 2018-09-17 DIAGNOSIS — H90.5 CENTRAL HEARING LOSS: ICD-10-CM

## 2018-09-17 DIAGNOSIS — D33.3 LEFT ACOUSTIC NEUROMA: Primary | ICD-10-CM

## 2018-09-17 LAB
BASOPHILS # BLD AUTO: 0.08 K/UL
BASOPHILS NFR BLD: 1.3 %
DIFFERENTIAL METHOD: ABNORMAL
EOSINOPHIL # BLD AUTO: 0.1 K/UL
EOSINOPHIL NFR BLD: 1.3 %
ERYTHROCYTE [DISTWIDTH] IN BLOOD BY AUTOMATED COUNT: 13.6 %
FERRITIN SERPL-MCNC: 10 NG/ML
HCT VFR BLD AUTO: 35.4 %
HGB BLD-MCNC: 10.8 G/DL
IMM GRANULOCYTES # BLD AUTO: 0.02 K/UL
IMM GRANULOCYTES NFR BLD AUTO: 0.3 %
IRON SERPL-MCNC: 170 UG/DL
LYMPHOCYTES # BLD AUTO: 1.3 K/UL
LYMPHOCYTES NFR BLD: 21.8 %
MCH RBC QN AUTO: 31.8 PG
MCHC RBC AUTO-ENTMCNC: 30.5 G/DL
MCV RBC AUTO: 104 FL
MONOCYTES # BLD AUTO: 0.5 K/UL
MONOCYTES NFR BLD: 8.6 %
NEUTROPHILS # BLD AUTO: 4 K/UL
NEUTROPHILS NFR BLD: 66.7 %
NRBC BLD-RTO: 0 /100 WBC
PLATELET # BLD AUTO: 352 K/UL
PMV BLD AUTO: 10.1 FL
RBC # BLD AUTO: 3.4 M/UL
SATURATED IRON: 45 %
TOTAL IRON BINDING CAPACITY: 379 UG/DL
TRANSFERRIN SERPL-MCNC: 256 MG/DL
WBC # BLD AUTO: 5.95 K/UL

## 2018-09-17 PROCEDURE — 83540 ASSAY OF IRON: CPT

## 2018-09-17 PROCEDURE — A9585 GADOBUTROL INJECTION: HCPCS | Performed by: NURSE PRACTITIONER

## 2018-09-17 PROCEDURE — 70553 MRI BRAIN STEM W/O & W/DYE: CPT | Mod: TC

## 2018-09-17 PROCEDURE — 36415 COLL VENOUS BLD VENIPUNCTURE: CPT | Mod: PO

## 2018-09-17 PROCEDURE — 85025 COMPLETE CBC W/AUTO DIFF WBC: CPT

## 2018-09-17 PROCEDURE — 70553 MRI BRAIN STEM W/O & W/DYE: CPT | Mod: 26,,, | Performed by: RADIOLOGY

## 2018-09-17 PROCEDURE — 82728 ASSAY OF FERRITIN: CPT

## 2018-09-17 PROCEDURE — 25500020 PHARM REV CODE 255: Performed by: NURSE PRACTITIONER

## 2018-09-17 RX ORDER — GADOBUTROL 604.72 MG/ML
INJECTION INTRAVENOUS
Status: DISPENSED
Start: 2018-09-17 | End: 2018-09-18

## 2018-09-17 RX ORDER — GADOBUTROL 604.72 MG/ML
4 INJECTION INTRAVENOUS
Status: COMPLETED | OUTPATIENT
Start: 2018-09-17 | End: 2018-09-17

## 2018-09-17 RX ADMIN — GADOBUTROL 4 ML: 604.72 INJECTION INTRAVENOUS at 01:09

## 2018-09-17 NOTE — TELEPHONE ENCOUNTER
Called pt. Need to reschedule appt with Dr. Turcios today. Dr. Turcios called out sick.  Rescheduled to 10/29/18. Thanks, [place]

## 2018-09-20 ENCOUNTER — TELEPHONE (OUTPATIENT)
Dept: OTOLARYNGOLOGY | Facility: CLINIC | Age: 48
End: 2018-09-20

## 2018-09-20 NOTE — TELEPHONE ENCOUNTER
----- Message from Siena Thomas NP sent at 9/18/2018  7:51 AM CDT -----  Normal MRI of brain. Nothing to explain difference in hearing.

## 2018-09-24 ENCOUNTER — TELEPHONE (OUTPATIENT)
Dept: FAMILY MEDICINE | Facility: CLINIC | Age: 48
End: 2018-09-24

## 2018-09-24 DIAGNOSIS — D50.9 IRON DEFICIENCY ANEMIA, UNSPECIFIED IRON DEFICIENCY ANEMIA TYPE: Primary | ICD-10-CM

## 2018-09-24 NOTE — TELEPHONE ENCOUNTER
Covering for ta. there is evidence of iron deficiency anemia. Is she noticing blood loss in her urine or stool. Is she still menstruating and having heavy periods?Needs to take ferrous sulfate 325 mg TID with vitamin C and have repeat labs in 3 months.

## 2018-09-27 NOTE — TELEPHONE ENCOUNTER
Called pt regarding below message. Pt states she is having irregular menstruations with heavy bleeding. Pt denies blood in urine and stool. Pt states iron supplements cause her to have a migraine. Informed pt I will send this information to Dr. Turcios and return her call with additional information. Pt verbalized understanding with no further questions.

## 2018-10-02 NOTE — TELEPHONE ENCOUNTER
She is going to need to talk to her ob gyn about her heavy menses as it is making her anemic. If she wants to be referred to hematology to discuss iron infusions let me know

## 2018-10-04 NOTE — TELEPHONE ENCOUNTER
Called pt regarding recommendations per Dr. Turcios. Pt states she has yet to buy the OTC Iron and vitamin C supplements. Pt states she will follow up with Jessi Chavira regarding heavy menses. Pt states she will determine if she wants to see hematology at her 3 month repeat labs. Pt verbalized understanding with no further questions.

## 2018-10-09 ENCOUNTER — TELEPHONE (OUTPATIENT)
Dept: FAMILY MEDICINE | Facility: CLINIC | Age: 48
End: 2018-10-09

## 2018-10-09 DIAGNOSIS — D50.9 IRON DEFICIENCY ANEMIA, UNSPECIFIED IRON DEFICIENCY ANEMIA TYPE: Primary | ICD-10-CM

## 2018-10-09 NOTE — TELEPHONE ENCOUNTER
Called pt regarding below message. Pt states iron supplements have caused an increase in migraines. Pt is requesting to try iron infusions to see if this ill be different. Informed pt I will send a request to Dr. Turcios and return her call with additional information. Pt verbalized understanding with no further questions.       ----- Message from Johanna Paredes sent at 10/9/2018  1:16 PM CDT -----  Dr Turcios advised to take Iron ... Having headaches / call [phone] to advise

## 2018-10-11 ENCOUNTER — OFFICE VISIT (OUTPATIENT)
Dept: OBSTETRICS AND GYNECOLOGY | Facility: CLINIC | Age: 48
End: 2018-10-11
Payer: MEDICARE

## 2018-10-11 VITALS
WEIGHT: 107.13 LBS | SYSTOLIC BLOOD PRESSURE: 112 MMHG | HEART RATE: 64 BPM | HEIGHT: 60 IN | BODY MASS INDEX: 21.03 KG/M2 | DIASTOLIC BLOOD PRESSURE: 70 MMHG | RESPIRATION RATE: 18 BRPM

## 2018-10-11 DIAGNOSIS — N94.4 PRIMARY DYSMENORRHEA: ICD-10-CM

## 2018-10-11 DIAGNOSIS — N92.0 MENORRHAGIA WITH REGULAR CYCLE: ICD-10-CM

## 2018-10-11 PROCEDURE — 99213 OFFICE O/P EST LOW 20 MIN: CPT | Mod: PBBFAC,PO | Performed by: OBSTETRICS & GYNECOLOGY

## 2018-10-11 PROCEDURE — 99204 OFFICE O/P NEW MOD 45 MIN: CPT | Mod: S$PBB,,, | Performed by: OBSTETRICS & GYNECOLOGY

## 2018-10-11 PROCEDURE — 99999 PR PBB SHADOW E&M-EST. PATIENT-LVL III: CPT | Mod: PBBFAC,,, | Performed by: OBSTETRICS & GYNECOLOGY

## 2018-10-11 RX ORDER — OMEPRAZOLE 40 MG/1
40 CAPSULE, DELAYED RELEASE ORAL DAILY
COMMUNITY
End: 2021-10-12 | Stop reason: SDUPTHER

## 2018-10-11 NOTE — PROGRESS NOTES
Subjective:       Patient ID: Alisson Eldridge is a 48 y.o. female.    Chief Complaint:  Establish Care and Menorrhagia (started monday/ heavy clotting/ gritty dark blood)      History of Present Illness  HPI  48 y.o.  Ab1 with complaint of excessive bleeding and pain with menses for past 2 years. Patient has had surgical sterilization in past.Patient with history of opiate dependence and is a cigarette smoker.    GYN & OB History  Patient's last menstrual period was 10/08/2018.   Date of Last Pap: 2018- WNL    OB History    Para Term  AB Living   3 2 2   1     SAB TAB Ectopic Multiple Live Births   1              # Outcome Date GA Lbr Kahlil/2nd Weight Sex Delivery Anes PTL Lv   3 SAB            2 Term            1 Term                   Review of Systems  Review of Systems   Constitutional: Negative for activity change, appetite change, chills, diaphoresis, fatigue, fever and unexpected weight change.   HENT: Negative for mouth sores and tinnitus.    Eyes: Negative for discharge and visual disturbance.   Respiratory: Negative for cough, shortness of breath and wheezing.    Cardiovascular: Negative for chest pain, palpitations and leg swelling.   Gastrointestinal: Negative for abdominal pain, bloating, blood in stool, constipation, diarrhea, nausea and vomiting.   Endocrine: Negative for diabetes, hair loss, hot flashes, hyperthyroidism and hypothyroidism.   Genitourinary: Positive for menorrhagia, menstrual problem and dysmenorrhea. Negative for decreased libido, dyspareunia, dysuria, flank pain, frequency, genital sores, hematuria, pelvic pain, urgency, vaginal bleeding, vaginal discharge, vaginal pain, urinary incontinence, postcoital bleeding, postmenopausal bleeding and vaginal odor.   Musculoskeletal: Negative for back pain, joint swelling and myalgias.   Skin:  Negative for rash, no acne and hair changes.   Neurological: Negative for seizures, syncope, numbness and headaches.    Hematological: Negative for adenopathy. Does not bruise/bleed easily.   Psychiatric/Behavioral: Positive for depression. Negative for sleep disturbance. The patient is nervous/anxious.    Breast: Negative for breast mass, breast pain, nipple discharge and skin changes          Objective:    Physical Exam:   Constitutional: She is oriented to person, place, and time. She appears well-developed and well-nourished. No distress.    HENT:   Head: Normocephalic.    Eyes: Pupils are equal, round, and reactive to light.    Neck: Normal range of motion.    Cardiovascular: Normal rate.     Pulmonary/Chest: Effort normal.        Abdominal: Soft. She exhibits no distension. There is no tenderness.     Genitourinary: Vagina normal and uterus normal. No vaginal discharge found.           Musculoskeletal: Normal range of motion and moves all extremeties.       Neurological: She is alert and oriented to person, place, and time.    Skin: Skin is warm and dry.           Assessment:        1. Menorrhagia with regular cycle    2. Primary dysmenorrhea                Plan:      Patient agrees to scheduling a TL with [facility]

## 2018-10-11 NOTE — LETTER
October 11, 2018      Radha Turcios MD  [address]  [facility] [address]           [facility]/ [facility]  [address] [place] [address] [address]  [facility] [address]  Phone: [phone]          Patient: Alisson Eldridge   MR Number: [id number]   YOB: 1970   Date of Visit: 10/11/2018       Dear Dr. Radha Turcios:    Thank you for referring Alisson Eldridge to me for evaluation. Attached you will find relevant portions of my assessment and plan of care.    If you have questions, please do not hesitate to call me. I look forward to following Alisson Eldridge along with you.    Sincerely,    Matthew Rene MD    Enclosure  CC:  No Recipients    If you would like to receive this communication electronically, please contact [email or web address] or [phone] to request more information on [employer] access.    For providers and/or their staff who would like to refer a patient to [facility], please contact us through our one-stop-shop provider referral line, [facility] , at [phone].    If you feel you have received this communication in error or would no longer like to receive these types of communications, please e-mail [email or web address]

## 2018-10-15 ENCOUNTER — PATIENT OUTREACH (OUTPATIENT)
Dept: ADMINISTRATIVE | Facility: HOSPITAL | Age: 48
End: 2018-10-15

## 2018-10-15 DIAGNOSIS — Z00.00 HEALTHCARE MAINTENANCE: Primary | ICD-10-CM

## 2018-10-15 DIAGNOSIS — Z00.00 HEALTH CARE MAINTENANCE: ICD-10-CM

## 2018-10-15 NOTE — LETTER
October 15, 2018    Alisson Eldridge  [address]  [place] [address]             [facility]  [address]  [facility] [address]  Phone: [phone]         Dear Rose [facility] is committed to your overall health and would like to ensure that you are up to date on your recommended test and/or procedures.   Radha Turcios MD  has found that your chart shows you may be due for the following:    Lipid Panel    If you have had any of the above done at another facility, please let us know so that we may obtain copies from that facility.  If you have a copy of these records, please provide a copy for us to scan into your chart.  You are welcome to request that the report be faxed to us at  ([phone]).     Otherwise, please schedule these appointments at your earliest convenience by calling [phone] or going to [email or web address].        Sincerely,  Your [facility]  MD Jessica Sanderson LPN Clinical Care Coordinator  [facility] [address] [place] [address]  Phone [phone]  Fax [phone]

## 2018-10-16 ENCOUNTER — TELEPHONE (OUTPATIENT)
Dept: HEMATOLOGY/ONCOLOGY | Facility: CLINIC | Age: 48
End: 2018-10-16

## 2018-10-16 NOTE — TELEPHONE ENCOUNTER
----- Message from Sunita Frye sent at 10/16/2018  9:46 AM CDT -----  Contact: patient  Patient is calling to reschedule her appt. Please call [phone]

## 2018-10-23 ENCOUNTER — OFFICE VISIT (OUTPATIENT)
Dept: OBSTETRICS AND GYNECOLOGY | Facility: CLINIC | Age: 48
End: 2018-10-23
Payer: MEDICARE

## 2018-10-23 ENCOUNTER — LAB VISIT (OUTPATIENT)
Dept: LAB | Facility: HOSPITAL | Age: 48
End: 2018-10-23
Attending: OBSTETRICS & GYNECOLOGY
Payer: MEDICARE

## 2018-10-23 VITALS — HEIGHT: 60 IN | BODY MASS INDEX: 21.01 KG/M2 | WEIGHT: 107 LBS

## 2018-10-23 DIAGNOSIS — Z01.818 PREOP TESTING: Primary | ICD-10-CM

## 2018-10-23 DIAGNOSIS — R10.2 PELVIC AND PERINEAL PAIN: ICD-10-CM

## 2018-10-23 DIAGNOSIS — Z01.818 PREOP TESTING: ICD-10-CM

## 2018-10-23 LAB
BASOPHILS # BLD AUTO: 0 K/UL
BASOPHILS NFR BLD: 0 %
DIFFERENTIAL METHOD: ABNORMAL
EOSINOPHIL # BLD AUTO: 0.1 K/UL
EOSINOPHIL NFR BLD: 1.6 %
ERYTHROCYTE [DISTWIDTH] IN BLOOD BY AUTOMATED COUNT: 12.8 %
HCG INTACT+B SERPL-ACNC: <1.2 MIU/ML
HCT VFR BLD AUTO: 33.4 %
HGB BLD-MCNC: 11.3 G/DL
LYMPHOCYTES # BLD AUTO: 1.3 K/UL
LYMPHOCYTES NFR BLD: 21.1 %
MCH RBC QN AUTO: 32.1 PG
MCHC RBC AUTO-ENTMCNC: 34 G/DL
MCV RBC AUTO: 94 FL
MONOCYTES # BLD AUTO: 0.6 K/UL
MONOCYTES NFR BLD: 8.8 %
NEUTROPHILS # BLD AUTO: 4.3 K/UL
NEUTROPHILS NFR BLD: 68.5 %
PLATELET # BLD AUTO: 286 K/UL
PMV BLD AUTO: 7.5 FL
RBC # BLD AUTO: 3.54 M/UL
WBC # BLD AUTO: 6.3 K/UL

## 2018-10-23 PROCEDURE — 36415 COLL VENOUS BLD VENIPUNCTURE: CPT

## 2018-10-23 PROCEDURE — 85025 COMPLETE CBC W/AUTO DIFF WBC: CPT

## 2018-10-23 PROCEDURE — 99213 OFFICE O/P EST LOW 20 MIN: CPT | Mod: S$PBB,,, | Performed by: OBSTETRICS & GYNECOLOGY

## 2018-10-23 PROCEDURE — 99213 OFFICE O/P EST LOW 20 MIN: CPT | Mod: PBBFAC,PO | Performed by: OBSTETRICS & GYNECOLOGY

## 2018-10-23 PROCEDURE — 84702 CHORIONIC GONADOTROPIN TEST: CPT

## 2018-10-23 PROCEDURE — 99999 PR PBB SHADOW E&M-EST. PATIENT-LVL III: CPT | Mod: PBBFAC,,, | Performed by: OBSTETRICS & GYNECOLOGY

## 2018-10-23 NOTE — H&P (VIEW-ONLY)
Subjective:       Patient ID: Alisson Eldridge is a 48 y.o. female.    Chief Complaint:  Pre-op Exam      History of Present Illness  HPI  48 y.o.  Ab1 for pre-op consultation. Patient with heavy and painful menses for past 2 years. Patient requests definitive surgery for this problem. Patient has had surgical sterilization.    GYN & OB History  Patient's last menstrual period was 10/08/2018.   Date of Last Pap: 2018: normal    OB History    Para Term  AB Living   3 2 2   1     SAB TAB Ectopic Multiple Live Births   1              # Outcome Date GA Lbr Kahlil/2nd Weight Sex Delivery Anes PTL Lv   3 SAB            2 Term            1 Term                   Review of Systems  Review of Systems   Constitutional: Negative for activity change, appetite change, chills, diaphoresis, fatigue, fever and unexpected weight change.   HENT: Negative for mouth sores and tinnitus.    Eyes: Negative for discharge and visual disturbance.   Respiratory: Negative for cough, shortness of breath and wheezing.    Cardiovascular: Negative for chest pain, palpitations and leg swelling.   Gastrointestinal: Negative for abdominal pain, bloating, blood in stool, constipation, diarrhea, nausea and vomiting.   Endocrine: Negative for diabetes, hair loss, hot flashes, hyperthyroidism and hypothyroidism.   Genitourinary: Positive for menorrhagia, menstrual problem and dysmenorrhea. Negative for decreased libido, dyspareunia, dysuria, flank pain, frequency, genital sores, hematuria, pelvic pain, urgency, vaginal bleeding, vaginal discharge, vaginal pain, urinary incontinence, postcoital bleeding, postmenopausal bleeding and vaginal odor.   Musculoskeletal: Negative for back pain, joint swelling and myalgias.   Skin:  Negative for rash, no acne and hair changes.   Neurological: Negative for seizures, syncope, numbness and headaches.   Hematological: Negative for adenopathy. Does not bruise/bleed easily.    Psychiatric/Behavioral: Positive for depression. Negative for sleep disturbance. The patient is not nervous/anxious.    Breast: Negative for breast mass, breast pain, nipple discharge and skin changes          Objective:    Physical Exam:   Constitutional: She is oriented to person, place, and time. She appears well-developed and well-nourished. No distress.    HENT:   Head: Normocephalic.    Eyes: Pupils are equal, round, and reactive to light.    Neck: Normal range of motion.    Cardiovascular: Normal rate.     Pulmonary/Chest: Effort normal.        Abdominal: Soft. She exhibits no distension.     Genitourinary:   Genitourinary Comments: Deferred today           Musculoskeletal: Normal range of motion and moves all extremeties.       Neurological: She is alert and oriented to person, place, and time.    Skin: Skin is warm and dry.    Psychiatric: She has a normal mood and affect. Her behavior is normal. Judgment and thought content normal.          Assessment:        1. Preop testing    2. Pelvic and perineal pain     3.      Dysmenorrhea and Menorrhagia            Plan:      WILLIAM VALDEZ on 10/29/18 at [facility]. Consents signed today after discussion of risks associated with the procedure including infection,damage to bowel,bladder,ureters,and major blood vessels.

## 2018-10-23 NOTE — PROGRESS NOTES
Subjective:       Patient ID: Alisson Eldridge is a 48 y.o. female.    Chief Complaint:  Pre-op Exam      History of Present Illness  HPI  48 y.o.  Ab1 for pre-op consultation. Patient with heavy and painful menses for past 2 years. Patient requests definitive surgery for this problem. Patient has had surgical sterilization.    GYN & OB History  Patient's last menstrual period was 10/08/2018.   Date of Last Pap: 2018: normal    OB History    Para Term  AB Living   3 2 2   1     SAB TAB Ectopic Multiple Live Births   1              # Outcome Date GA Lbr Kahlil/2nd Weight Sex Delivery Anes PTL Lv   3 SAB            2 Term            1 Term                   Review of Systems  Review of Systems   Constitutional: Negative for activity change, appetite change, chills, diaphoresis, fatigue, fever and unexpected weight change.   HENT: Negative for mouth sores and tinnitus.    Eyes: Negative for discharge and visual disturbance.   Respiratory: Negative for cough, shortness of breath and wheezing.    Cardiovascular: Negative for chest pain, palpitations and leg swelling.   Gastrointestinal: Negative for abdominal pain, bloating, blood in stool, constipation, diarrhea, nausea and vomiting.   Endocrine: Negative for diabetes, hair loss, hot flashes, hyperthyroidism and hypothyroidism.   Genitourinary: Positive for menorrhagia, menstrual problem and dysmenorrhea. Negative for decreased libido, dyspareunia, dysuria, flank pain, frequency, genital sores, hematuria, pelvic pain, urgency, vaginal bleeding, vaginal discharge, vaginal pain, urinary incontinence, postcoital bleeding, postmenopausal bleeding and vaginal odor.   Musculoskeletal: Negative for back pain, joint swelling and myalgias.   Skin:  Negative for rash, no acne and hair changes.   Neurological: Negative for seizures, syncope, numbness and headaches.   Hematological: Negative for adenopathy. Does not bruise/bleed easily.    Psychiatric/Behavioral: Positive for depression. Negative for sleep disturbance. The patient is not nervous/anxious.    Breast: Negative for breast mass, breast pain, nipple discharge and skin changes          Objective:    Physical Exam:   Constitutional: She is oriented to person, place, and time. She appears well-developed and well-nourished. No distress.    HENT:   Head: Normocephalic.    Eyes: Pupils are equal, round, and reactive to light.    Neck: Normal range of motion.    Cardiovascular: Normal rate.     Pulmonary/Chest: Effort normal.        Abdominal: Soft. She exhibits no distension.     Genitourinary:   Genitourinary Comments: Deferred today           Musculoskeletal: Normal range of motion and moves all extremeties.       Neurological: She is alert and oriented to person, place, and time.    Skin: Skin is warm and dry.    Psychiatric: She has a normal mood and affect. Her behavior is normal. Judgment and thought content normal.          Assessment:        1. Preop testing    2. Pelvic and perineal pain     3.      Dysmenorrhea and Menorrhagia            Plan:      WILLIAM VALDEZ on 10/29/18 at [facility]. Consents signed today after discussion of risks associated with the procedure including infection,damage to bowel,bladder,ureters,and major blood vessels.

## 2018-10-26 ENCOUNTER — ANESTHESIA EVENT (OUTPATIENT)
Dept: SURGERY | Facility: HOSPITAL | Age: 48
End: 2018-10-26
Payer: MEDICARE

## 2018-10-26 RX ORDER — GUAIFENESIN 600 MG/1
1200 TABLET, EXTENDED RELEASE ORAL 2 TIMES DAILY
COMMUNITY
End: 2018-11-26 | Stop reason: CLARIF

## 2018-10-26 RX ORDER — ACETAMINOPHEN 500 MG
1000 TABLET ORAL EVERY 6 HOURS PRN
COMMUNITY
End: 2023-03-16 | Stop reason: ALTCHOICE

## 2018-10-29 ENCOUNTER — ANESTHESIA (OUTPATIENT)
Dept: SURGERY | Facility: HOSPITAL | Age: 48
End: 2018-10-29
Payer: MEDICARE

## 2018-10-29 ENCOUNTER — HOSPITAL ENCOUNTER (OUTPATIENT)
Facility: HOSPITAL | Age: 48
Discharge: HOME | End: 2018-10-29
Attending: OBSTETRICS & GYNECOLOGY | Admitting: OBSTETRICS & GYNECOLOGY
Payer: MEDICARE

## 2018-10-29 DIAGNOSIS — Z90.710 STATUS POST LAPAROSCOPIC HYSTERECTOMY: Primary | ICD-10-CM

## 2018-10-29 PROBLEM — N93.9 ABNORMAL UTERINE AND VAGINAL BLEEDING, UNSPECIFIED: Status: ACTIVE | Noted: 2018-10-29

## 2018-10-29 LAB
B-HCG UR QL: NEGATIVE
CTP QC/QA: YES

## 2018-10-29 PROCEDURE — 58571 TLH W/T/O 250 G OR LESS: CPT | Mod: 80,,, | Performed by: OBSTETRICS & GYNECOLOGY

## 2018-10-29 PROCEDURE — D9220A PRA ANESTHESIA: Mod: CRNA,,, | Performed by: NURSE ANESTHETIST, CERTIFIED REGISTERED

## 2018-10-29 PROCEDURE — 88307 TISSUE EXAM BY PATHOLOGIST: CPT | Mod: 26,,, | Performed by: PATHOLOGY

## 2018-10-29 PROCEDURE — 88307 TISSUE EXAM BY PATHOLOGIST: CPT | Performed by: PATHOLOGY

## 2018-10-29 PROCEDURE — 36000711: Mod: PO | Performed by: OBSTETRICS & GYNECOLOGY

## 2018-10-29 PROCEDURE — 25000003 PHARM REV CODE 250: Mod: PO | Performed by: OBSTETRICS & GYNECOLOGY

## 2018-10-29 PROCEDURE — D9220A PRA ANESTHESIA: Mod: ANES,,, | Performed by: ANESTHESIOLOGY

## 2018-10-29 PROCEDURE — 58571 TLH W/T/O 250 G OR LESS: CPT | Mod: ,,, | Performed by: OBSTETRICS & GYNECOLOGY

## 2018-10-29 PROCEDURE — 63600175 PHARM REV CODE 636 W HCPCS: Mod: PO | Performed by: NURSE ANESTHETIST, CERTIFIED REGISTERED

## 2018-10-29 PROCEDURE — 36000710: Mod: PO | Performed by: OBSTETRICS & GYNECOLOGY

## 2018-10-29 PROCEDURE — 71000016 HC POSTOP RECOV ADDL HR: Mod: PO | Performed by: OBSTETRICS & GYNECOLOGY

## 2018-10-29 PROCEDURE — 71000039 HC RECOVERY, EACH ADD'L HOUR: Mod: PO | Performed by: OBSTETRICS & GYNECOLOGY

## 2018-10-29 PROCEDURE — 27201423 OPTIME MED/SURG SUP & DEVICES STERILE SUPPLY: Mod: PO | Performed by: OBSTETRICS & GYNECOLOGY

## 2018-10-29 PROCEDURE — 37000008 HC ANESTHESIA 1ST 15 MINUTES: Mod: PO | Performed by: OBSTETRICS & GYNECOLOGY

## 2018-10-29 PROCEDURE — 37000009 HC ANESTHESIA EA ADD 15 MINS: Mod: PO | Performed by: OBSTETRICS & GYNECOLOGY

## 2018-10-29 PROCEDURE — 71000015 HC POSTOP RECOV 1ST HR: Mod: PO | Performed by: OBSTETRICS & GYNECOLOGY

## 2018-10-29 PROCEDURE — 25000003 PHARM REV CODE 250: Mod: PO | Performed by: NURSE ANESTHETIST, CERTIFIED REGISTERED

## 2018-10-29 PROCEDURE — 81025 URINE PREGNANCY TEST: CPT | Mod: PO | Performed by: OBSTETRICS & GYNECOLOGY

## 2018-10-29 PROCEDURE — 63600175 PHARM REV CODE 636 W HCPCS: Mod: PO | Performed by: ANESTHESIOLOGY

## 2018-10-29 PROCEDURE — 71000033 HC RECOVERY, INTIAL HOUR: Mod: PO | Performed by: OBSTETRICS & GYNECOLOGY

## 2018-10-29 RX ORDER — FENTANYL CITRATE 50 UG/ML
25 INJECTION, SOLUTION INTRAMUSCULAR; INTRAVENOUS EVERY 5 MIN PRN
Status: DISCONTINUED | OUTPATIENT
Start: 2018-10-29 | End: 2018-10-29 | Stop reason: HOSPADM

## 2018-10-29 RX ORDER — DEXAMETHASONE SODIUM PHOSPHATE 4 MG/ML
8 INJECTION, SOLUTION INTRA-ARTICULAR; INTRALESIONAL; INTRAMUSCULAR; INTRAVENOUS; SOFT TISSUE
Status: COMPLETED | OUTPATIENT
Start: 2018-10-29 | End: 2018-10-29

## 2018-10-29 RX ORDER — LIDOCAINE HCL/PF 100 MG/5ML
SYRINGE (ML) INTRAVENOUS
Status: DISCONTINUED | OUTPATIENT
Start: 2018-10-29 | End: 2018-10-29

## 2018-10-29 RX ORDER — ONDANSETRON 2 MG/ML
4 INJECTION INTRAMUSCULAR; INTRAVENOUS EVERY 6 HOURS PRN
Status: DISCONTINUED | OUTPATIENT
Start: 2018-10-29 | End: 2018-10-29 | Stop reason: HOSPADM

## 2018-10-29 RX ORDER — ACETAMINOPHEN 10 MG/ML
INJECTION, SOLUTION INTRAVENOUS
Status: DISCONTINUED | OUTPATIENT
Start: 2018-10-29 | End: 2018-10-29

## 2018-10-29 RX ORDER — OXYCODONE AND ACETAMINOPHEN 5; 325 MG/1; MG/1
1 TABLET ORAL EVERY 4 HOURS PRN
Status: DISCONTINUED | OUTPATIENT
Start: 2018-10-29 | End: 2018-10-29 | Stop reason: HOSPADM

## 2018-10-29 RX ORDER — OXYCODONE AND ACETAMINOPHEN 10; 325 MG/1; MG/1
1 TABLET ORAL EVERY 4 HOURS PRN
Status: DISCONTINUED | OUTPATIENT
Start: 2018-10-29 | End: 2018-10-29 | Stop reason: HOSPADM

## 2018-10-29 RX ORDER — METOCLOPRAMIDE HYDROCHLORIDE 5 MG/ML
5 INJECTION INTRAMUSCULAR; INTRAVENOUS EVERY 6 HOURS PRN
Status: DISCONTINUED | OUTPATIENT
Start: 2018-10-29 | End: 2018-10-29 | Stop reason: HOSPADM

## 2018-10-29 RX ORDER — HYDROMORPHONE HYDROCHLORIDE 2 MG/ML
1 INJECTION, SOLUTION INTRAMUSCULAR; INTRAVENOUS; SUBCUTANEOUS EVERY 4 HOURS PRN
Status: DISCONTINUED | OUTPATIENT
Start: 2018-10-29 | End: 2018-10-29 | Stop reason: HOSPADM

## 2018-10-29 RX ORDER — OXYCODONE HYDROCHLORIDE 5 MG/1
5 TABLET ORAL ONCE AS NEEDED
Status: DISCONTINUED | OUTPATIENT
Start: 2018-10-29 | End: 2018-10-29 | Stop reason: HOSPADM

## 2018-10-29 RX ORDER — KETOROLAC TROMETHAMINE 30 MG/ML
INJECTION, SOLUTION INTRAMUSCULAR; INTRAVENOUS
Status: DISCONTINUED | OUTPATIENT
Start: 2018-10-29 | End: 2018-10-29

## 2018-10-29 RX ORDER — CEFAZOLIN SODIUM 1 G/3ML
INJECTION, POWDER, FOR SOLUTION INTRAMUSCULAR; INTRAVENOUS
Status: DISCONTINUED | OUTPATIENT
Start: 2018-10-29 | End: 2018-10-29

## 2018-10-29 RX ORDER — ROCURONIUM BROMIDE 10 MG/ML
INJECTION, SOLUTION INTRAVENOUS
Status: DISCONTINUED | OUTPATIENT
Start: 2018-10-29 | End: 2018-10-29

## 2018-10-29 RX ORDER — PROPOFOL 10 MG/ML
VIAL (ML) INTRAVENOUS
Status: DISCONTINUED | OUTPATIENT
Start: 2018-10-29 | End: 2018-10-29

## 2018-10-29 RX ORDER — MUPIROCIN 20 MG/G
1 OINTMENT TOPICAL 2 TIMES DAILY
Status: DISCONTINUED | OUTPATIENT
Start: 2018-10-29 | End: 2018-10-29 | Stop reason: HOSPADM

## 2018-10-29 RX ORDER — IBUPROFEN 600 MG/1
600 TABLET ORAL EVERY 6 HOURS
Status: DISCONTINUED | OUTPATIENT
Start: 2018-10-30 | End: 2018-10-29 | Stop reason: HOSPADM

## 2018-10-29 RX ORDER — BISACODYL 10 MG
10 SUPPOSITORY, RECTAL RECTAL DAILY PRN
Status: DISCONTINUED | OUTPATIENT
Start: 2018-10-29 | End: 2018-10-29 | Stop reason: HOSPADM

## 2018-10-29 RX ORDER — FENTANYL CITRATE 50 UG/ML
INJECTION, SOLUTION INTRAMUSCULAR; INTRAVENOUS
Status: DISCONTINUED | OUTPATIENT
Start: 2018-10-29 | End: 2018-10-29

## 2018-10-29 RX ORDER — METHYLENE BLUE 10 MG/ML
INJECTION INTRAVENOUS
Status: DISCONTINUED | OUTPATIENT
Start: 2018-10-29 | End: 2018-10-29

## 2018-10-29 RX ORDER — KETOROLAC TROMETHAMINE 30 MG/ML
15 INJECTION, SOLUTION INTRAMUSCULAR; INTRAVENOUS EVERY 6 HOURS PRN
Status: DISCONTINUED | OUTPATIENT
Start: 2018-10-29 | End: 2018-10-29 | Stop reason: HOSPADM

## 2018-10-29 RX ORDER — MIDAZOLAM HYDROCHLORIDE 1 MG/ML
INJECTION, SOLUTION INTRAMUSCULAR; INTRAVENOUS
Status: DISCONTINUED | OUTPATIENT
Start: 2018-10-29 | End: 2018-10-29

## 2018-10-29 RX ORDER — SODIUM CHLORIDE, SODIUM LACTATE, POTASSIUM CHLORIDE, CALCIUM CHLORIDE 600; 310; 30; 20 MG/100ML; MG/100ML; MG/100ML; MG/100ML
INJECTION, SOLUTION INTRAVENOUS CONTINUOUS
Status: DISCONTINUED | OUTPATIENT
Start: 2018-10-29 | End: 2018-10-29 | Stop reason: HOSPADM

## 2018-10-29 RX ORDER — ONDANSETRON 2 MG/ML
INJECTION INTRAMUSCULAR; INTRAVENOUS
Status: DISCONTINUED | OUTPATIENT
Start: 2018-10-29 | End: 2018-10-29

## 2018-10-29 RX ORDER — OXYCODONE AND ACETAMINOPHEN 5; 325 MG/1; MG/1
1 TABLET ORAL EVERY 4 HOURS PRN
Qty: 20 TABLET | Refills: 0 | Status: SHIPPED | OUTPATIENT
Start: 2018-10-29 | End: 2018-11-26 | Stop reason: CLARIF

## 2018-10-29 RX ORDER — LIDOCAINE HYDROCHLORIDE 10 MG/ML
1 INJECTION, SOLUTION EPIDURAL; INFILTRATION; INTRACAUDAL; PERINEURAL ONCE
Status: DISCONTINUED | OUTPATIENT
Start: 2018-10-29 | End: 2018-10-29 | Stop reason: HOSPADM

## 2018-10-29 RX ORDER — BUPIVACAINE HYDROCHLORIDE AND EPINEPHRINE 2.5; 5 MG/ML; UG/ML
INJECTION, SOLUTION EPIDURAL; INFILTRATION; INTRACAUDAL; PERINEURAL
Status: DISCONTINUED | OUTPATIENT
Start: 2018-10-29 | End: 2018-10-29 | Stop reason: HOSPADM

## 2018-10-29 RX ORDER — SODIUM CHLORIDE 0.9 % (FLUSH) 0.9 %
3 SYRINGE (ML) INJECTION
Status: DISCONTINUED | OUTPATIENT
Start: 2018-10-29 | End: 2018-10-29 | Stop reason: HOSPADM

## 2018-10-29 RX ADMIN — METHYLENE BLUE 25 MG: 10 INJECTION, SOLUTION INTRAVENOUS at 09:10

## 2018-10-29 RX ADMIN — CEFAZOLIN 2 G: 1 INJECTION, POWDER, FOR SOLUTION INTRAVENOUS at 08:10

## 2018-10-29 RX ADMIN — ONDANSETRON 4 MG: 2 INJECTION, SOLUTION INTRAMUSCULAR; INTRAVENOUS at 10:10

## 2018-10-29 RX ADMIN — FENTANYL CITRATE 25 MCG: 50 INJECTION, SOLUTION INTRAMUSCULAR; INTRAVENOUS at 10:10

## 2018-10-29 RX ADMIN — FENTANYL CITRATE 50 MCG: 50 INJECTION, SOLUTION INTRAMUSCULAR; INTRAVENOUS at 08:10

## 2018-10-29 RX ADMIN — ROCURONIUM BROMIDE 30 MG: 10 INJECTION, SOLUTION INTRAVENOUS at 08:10

## 2018-10-29 RX ADMIN — ACETAMINOPHEN 1000 MG: 10 INJECTION, SOLUTION INTRAVENOUS at 09:10

## 2018-10-29 RX ADMIN — LIDOCAINE HYDROCHLORIDE 75 MG: 20 INJECTION PARENTERAL at 08:10

## 2018-10-29 RX ADMIN — SODIUM CHLORIDE, SODIUM LACTATE, POTASSIUM CHLORIDE, AND CALCIUM CHLORIDE: .6; .31; .03; .02 INJECTION, SOLUTION INTRAVENOUS at 08:10

## 2018-10-29 RX ADMIN — PROPOFOL 160 MG: 10 INJECTION, EMULSION INTRAVENOUS at 08:10

## 2018-10-29 RX ADMIN — FENTANYL CITRATE 25 MCG: 50 INJECTION, SOLUTION INTRAMUSCULAR; INTRAVENOUS at 09:10

## 2018-10-29 RX ADMIN — DEXAMETHASONE SODIUM PHOSPHATE 8 MG: 4 INJECTION, SOLUTION INTRAMUSCULAR; INTRAVENOUS at 08:10

## 2018-10-29 RX ADMIN — KETOROLAC TROMETHAMINE 30 MG: 30 INJECTION, SOLUTION INTRAMUSCULAR; INTRAVENOUS at 10:10

## 2018-10-29 RX ADMIN — MIDAZOLAM HYDROCHLORIDE 2 MG: 1 INJECTION, SOLUTION INTRAMUSCULAR; INTRAVENOUS at 08:10

## 2018-10-29 RX ADMIN — OXYCODONE HYDROCHLORIDE AND ACETAMINOPHEN 1 TABLET: 10; 325 TABLET ORAL at 01:10

## 2018-10-29 NOTE — OP NOTE
DATE OF PROCEDURE:  10/29/2018.    PREOPERATIVE DIAGNOSES:  Dysmenorrhea with dysfunctional uterine bleeding.    POSTOPERATIVE DIAGNOSES:  Dysmenorrhea with dysfunctional uterine bleeding.    PROCEDURE PERFORMED:  Total laparoscopic hysterectomy with bilateral   salpingo-oophorectomy and cystoscopy.    ANESTHESIA:  General.    SURGEON:  Matthew Rene M.D.    ASSISTANT:  Colin Lewis MD.    ESTIMATED BLOOD LOSS:  Approximately 50 mL    SUMMARY:  The patient is a 48-year-old complaining of very painful heavy   episodes of uterine bleeding for the past several years.  The patient has had a   tubal ligation and is desirous of definitive surgical therapy.  The patient   therefore is consented for a laparoscopic hysterectomy with bilateral   salpingo-oophorectomy and possible cystoscopy and procedure, alternatives and   risks having been discussed, she was brought to the Operating Room on the   morning of October 29th, placed under general anesthesia in the dorsal lithotomy   position.  She was given 2 g of Ancef IV.  The abdomen, perineum and vagina   were prepped and draped in a sterile fashion.  Connors catheter was inserted in   the bladder using sterile technique.  A MARY intrauterine manipulator is then   placed by Dr. Lewis and a Veress needle was inserted through the umbilicus into   the peritoneal cavity, which is insufflated with 3 L of gas.  Veress needle is   removed.  Stab incision made through which a 5 mm trocar was placed at the   umbilicus.  Visualization of the uterus, tubes and ovaries revealed them to be   grossly normal.  No evidence of adhesions and a 10 mm trocar is placed to the   left side of the umbilicus and 5-mm trocar placed to the right side of the   umbilicus.  A Harmonic scalpel is used to transect the infundibulopelvic   ligaments bilaterally, the round ligaments bilaterally and a bladder flap was   formed and taken down off the lower uterine segment and cervix.  The uterine   vasculature  was coagulated and transected bilaterally using the Harmonic   scalpel.  An incision is made over the MARY cap starting in the posterior   cul-de-sac and moving circumferentially and thereby  the uterine and   cervix from the vaginal canal.  Uterus, both tubes and ovaries are then removed   through the vaginal cuff and the vaginal cuff is then closed with a running   suture of 2-0 Vicryl beginning at each angle and tied together at the middle of   the vaginal cuff.  Hemostasis was noted along the pedicles and Mirtha is then   placed to further guarantee hemostasis.  The 10 mm trocar incision site is   closed with a 0 Vicryl suture, reapproximating the fascial edges and the 5 mm   trocar sites were reapproximated with 3-0 Vicryl reapproximating the skin edges.    A cystoscopy was then done and after instillation of methylene blue dye IV,   there is noted to be blue dye extravasation from both ureteral orifices and the   bladder is noted to be intact.  The cystoscopy is then discontinued.  The   patient was taken out of the dorsal lithotomy position, brought to the Recovery   Room having tolerated the procedure well and in good condition.      PHILOMENA  dd: 10/29/2018 10:34:34 (CDT)  td: 10/29/2018 12:12:12 (CDT)  Doc ID   #[id number]  Job ID #[id number]    CC:

## 2018-10-29 NOTE — ANESTHESIA PREPROCEDURE EVALUATION
10/29/2018  Alisson Eldridge is a 48 y.o., female.    Anesthesia Evaluation      I have reviewed the Medications.     Review of Systems  Anesthesia Hx:  No problems with previous Anesthesia   Social:  Smoker Hx opiate dependence and withdrawal   Cardiovascular:  Cardiovascular Normal     Pulmonary:   Asthma    Renal/:  Renal/ Normal     Hepatic/GI:   GERD    Neurological:  Neurology Normal    Endocrine:  Endocrine Normal    Psych:   Psychiatric History          Physical Exam  General:  Well nourished    Airway/Jaw/Neck:  Airway Findings: Mouth Opening: Normal Tongue: Normal  General Airway Assessment: Adult, Average  Mallampati: II  Jaw/Neck Findings:  Neck ROM: Normal ROM      Dental:  Dental Findings: Upper Dentures, Lower Dentures   Chest/Lungs:  Chest/Lungs Findings: Clear to auscultation, Normal Respiratory Rate     Heart/Vascular:  Heart Findings: Rate: Normal  Rhythm: Regular Rhythm  Sounds: Normal  Heart murmur: negative       Mental Status:  Mental Status Findings:  Cooperative, Alert and Oriented, Anxious         Anesthesia Plan  Type of Anesthesia, risks & benefits discussed:  Anesthesia Type:  general  Patient's Preference:   Intra-op Monitoring Plan:   Intra-op Monitoring Plan Comments:   Post Op Pain Control Plan:   Post Op Pain Control Plan Comments:   Induction:   IV  Beta Blocker:  Patient is not currently on a Beta-Blocker (No further documentation required).       Informed Consent: Patient understands risks and agrees with Anesthesia plan.  Questions answered. Anesthesia consent signed with patient.  ASA Score: 2     Day of Surgery Review of History & Physical:            Ready For Surgery From Anesthesia Perspective.

## 2018-10-29 NOTE — CARE UPDATE
Called Dr. Rene and notified him of patient's DC as she met all required criteria to be discharged home.

## 2018-10-29 NOTE — OP NOTE
[facility]  Brief Operative Note     SUMMARY     Surgery Date: 10/29/2018     Surgeon(s) and Role:     * Matthew Rene MD - Primary     * Colin Lewis MD - Assisting        Pre-op Diagnosis:  Primary dysmenorrhea [N94.4]  Menorrhagia with regular cycle [N92.0]    Post-op Diagnosis:  Primary dysmenorrhea [N94.4]  Menorrhagia with regular cycle [N92.0]    Procedure(s) (LRB):  HYSTERECTOMY, TOTAL, LAPAROSCOPIC (N/A)  SALPINGO-OOPHORECTOMY, LAPAROSCOPIC    Anesthesia: General    Description of the findings of the procedure: See Op dictation: # [id number]    Estimated Blood Loss: 50 cc         Specimens:   Specimen (12h ago, onward)    Start     Ordered    10/29/18 1005  Specimen to Pathology - Surgery  Once     Comments:  Pre-op Diagnosis: Primary dysmenorrhea [N94.4]Menorrhagia with regular cycle [N92.0]Post-op Diagnosis: UNKNOWNProcedure(s):HYSTERECTOMY, TOTAL, LAPAROSCOPICSALPINGO-OOPHORECTOMY, LAPAROSCOPIC Number of specimens: 1Name of specimens: 1.UTERUS,CERVIX,BILAT TUBES & OVARIES     Start Status   10/29/18 1005 Collected (10/29/18 1005)       10/29/18 1005          Discharge Note    SUMMARY     Admit Date: 10/29/2018    Discharge Date and Time: No discharge date for patient encounter.    Hospital Course : Patient admitted for laparoscopic hysterectomy with BSO. Patient tolerated procedure well and has a normal post-op course. Patient stable for discharge.    Final Diagnosis: Post-Op Diagnosis Codes:     * Primary dysmenorrhea [N94.4]     * Menorrhagia with regular cycle [N92.0]    Disposition: Home or Self Care    Follow Up/Patient Instructions:   Diet as tolerated.   Follow  Up with provider as scheduled.    Medications:  Reconciled Home Medications:      Medication List      ASK your doctor about these medications    acetaminophen 500 MG tablet  Commonly known as:  TYLENOL  Take 1,000 mg by mouth every 6 (six) hours as needed for Pain.     azelastine 137 mcg (0.1 %) nasal  spray  Commonly known as:  ASTELIN  1 spray (137 mcg total) by Nasal route 2 (two) times daily.     fluticasone 50 mcg/actuation nasal spray  Commonly known as:  FLONASE  1 spray (50 mcg total) by Each Nare route 2 (two) times daily.     guaiFENesin 600 mg 12 hr tablet  Commonly known as:  MUCINEX  Take 1,200 mg by mouth 2 (two) times daily.     omeprazole 40 MG capsule  Commonly known as:  PRILOSEC  Take 40 mg by mouth once daily.          No discharge procedures on file.  Follow-up Information     Matthew Rene MD In 1 week.    Specialties:  Obstetrics and Gynecology, Obstetrics  Contact information:  [address]  [place] [address]  [phone]             Matthew Rene MD In 1 week.    Specialties:  Obstetrics and Gynecology, Obstetrics  Contact information:  [address]  [facility] [address]  [phone]

## 2018-10-29 NOTE — DISCHARGE INSTRUCTIONS
HYSTEROSCOPY/ENDOMETRIAL ABLATION/D&C  After surgery:  DOS:   Minimal activity for 24 hours.    Advance diet as tolerated   Expect some irregular menstrual bleeding.   Showers only for 6 weeks    Resume home medications as prescribed    DONT:   No lifting more than 30 pounds for 6 weeks.    Nothing in the vagina for 6 weeks. No tampons, douching or intercourse.   No driving for 24 hours or while taking narcotic pain medication   Before driving, be sure you can press the brakes all the way to the floor without difficulty or pain.   DO NOT TAKE ADDITIONAL TYLENOL/ACETAMINOPHEN WHILE TAKING NARCOTIC PAIN MEDICATION THAT CONTAINS TYLENOL/ACETAMINOPHEN.    CALL PHYSICIAN FOR:   Heavy vaginal bleeding.   Fever>100.5   Persistent nausea and vomiting lasting > 12 hours   Redness, swelling, or drainage from incisions   Abdominal pain not improving day to day.      Discharge Instructions: After Your Surgery  Youve just had surgery. During surgery, you were given medicine called anesthesia to keep you relaxed and free of pain. After surgery, you may have some pain or nausea. This is common. Here are some tips for feeling better and getting well after surgery.     Stay on schedule with your medicine.   Going home  Your healthcare provider will show you how to take care of yourself when you go home. He or she will also answer your questions. Have an adult family member or friend drive you home. For the first 24 hours after your surgery:  · Do not drive or use heavy equipment.  · Do not make important decisions or sign legal papers.  · Do not drink alcohol.  · Have someone stay with you, if needed. He or she can watch for problems and help keep you safe.  Be sure to go to all follow-up visits with your healthcare provider. And rest after your surgery for as long as your healthcare provider tells you to.  Coping with pain  If you have pain after surgery, pain medicine will help you feel better. Take it as told,  before pain becomes severe. Also, ask your healthcare provider or pharmacist about other ways to control pain. This might be with heat, ice, or relaxation. And follow any other instructions your surgeon or nurse gives you.  Tips for taking pain medicine  To get the best relief possible, remember these points:  · Pain medicines can upset your stomach. Taking them with a little food may help.  · Most pain relievers taken by mouth need at least 20 to 30 minutes to start to work.  · Taking medicine on a schedule can help you remember to take it. Try to time your medicine so that you can take it before starting an activity. This might be before you get dressed, go for a walk, or sit down for dinner.  · Constipation is a common side effect of pain medicines. Call your healthcare provider before taking any medicines such as laxatives or stool softeners to help ease constipation. Also ask if you should skip any foods. Drinking lots of fluids and eating foods such as fruits and vegetables that are high in fiber can also help. Remember, do not take laxatives unless your surgeon has prescribed them.  · Drinking alcohol and taking pain medicine can cause dizziness and slow your breathing. It can even be deadly. Do not drink alcohol while taking pain medicine.  · Pain medicine can make you react more slowly to things. Do not drive or run machinery while taking pain medicine.  Your healthcare provider may tell you to take acetaminophen to help ease your pain. Ask him or her how much you are supposed to take each day. Acetaminophen or other pain relievers may interact with your prescription medicines or other over-the-counter (OTC) medicines. Some prescription medicines have acetaminophen and other ingredients. Using both prescription and OTC acetaminophen for pain can cause you to overdose. Read the labels on your OTC medicines with care. This will help you to clearly know the list of ingredients, how much to take, and  any warnings. It may also help you not take too much acetaminophen. If you have questions or do not understand the information, ask your pharmacist or healthcare provider to explain it to you before you take the OTC medicine.  Managing nausea  Some people have an upset stomach after surgery. This is often because of anesthesia, pain, or pain medicine, or the stress of surgery. These tips will help you handle nausea and eat healthy foods as you get better. If you were on a special food plan before surgery, ask your healthcare provider if you should follow it while you get better. These tips may help:  · Do not push yourself to eat. Your body will tell you when to eat and how much.  · Start off with clear liquids and soup. They are easier to digest.  · Next try semi-solid foods, such as mashed potatoes, applesauce, and gelatin, as you feel ready.  · Slowly move to solid foods. Dont eat fatty, rich, or spicy foods at first.  · Do not force yourself to have 3 large meals a day. Instead eat smaller amounts more often.  · Take pain medicines with a small amount of solid food, such as crackers or toast, to avoid nausea.     Call your surgeon if  · You still have pain an hour after taking medicine. The medicine may not be strong enough.  · You feel too sleepy, dizzy, or groggy. The medicine may be too strong.  · You have side effects like nausea, vomiting, or skin changes, such as rash, itching, or hives.       If you have obstructive sleep apnea  You were given anesthesia medicine during surgery to keep you comfortable and free of pain. After surgery, you may have more apnea spells because of this medicine and other medicines you were given. The spells may last longer than usual.   At home:  · Keep using the continuous positive airway pressure (CPAP) device when you sleep. Unless your healthcare provider tells you not to, use it when you sleep, day or night. CPAP is a common device used to treat obstructive sleep  apnea.  · Talk with your provider before taking any pain medicine, muscle relaxants, or sedatives. Your provider will tell you about the possible dangers of taking these medicines.  Date Last Reviewed: 12/1/2016  © 3377-6724 [employer]. [address] [place] [address], [place] [address]. All rights reserved. This information is not intended as a substitute for professional medical care. Always follow your healthcare professional's instructions.

## 2018-10-29 NOTE — ANESTHESIA POSTPROCEDURE EVALUATION
Anesthesia Post Evaluation    Patient: Alisson Eldridge    Procedure(s) Performed: Procedure(s) (LRB):  HYSTERECTOMY, TOTAL, LAPAROSCOPIC (N/A)  SALPINGO-OOPHORECTOMY, LAPAROSCOPIC    Final Anesthesia Type: general  Patient location during evaluation: PACU  Patient participation: Yes- Able to Participate  Level of consciousness: awake and alert  Post-procedure vital signs: reviewed and stable  Pain management: adequate  Airway patency: patent  PONV status at discharge: No PONV  Anesthetic complications: no      Cardiovascular status: hemodynamically stable and blood pressure returned to baseline  Respiratory status: unassisted, spontaneous ventilation and room air  Hydration status: euvolemic  Follow-up not needed.        Visit Vitals  BP (!) 112/58 (BP Location: Left arm, Patient Position: Lying)   Pulse 66   Temp 36.4 °C (97.5 °F) (Skin)   Resp 14   Ht 5' (1.524 m)   Wt 49.9 kg (110 lb)   LMP 10/08/2018   SpO2 98%   Breastfeeding? No   BMI 21.48 kg/m²       Pain/Hannah Score: Pain Assessment Performed: Yes (10/29/2018 11:00 AM)  Presence of Pain: complains of pain/discomfort (10/29/2018 11:00 AM)  Hannah Score: 9 (10/29/2018 11:00 AM)

## 2018-10-29 NOTE — TRANSFER OF CARE
Anesthesia Transfer of Care Note    Patient: Alisson Eldridge    Procedure(s) Performed: Procedure(s) (LRB):  HYSTERECTOMY, TOTAL, LAPAROSCOPIC (N/A)  SALPINGO-OOPHORECTOMY, LAPAROSCOPIC    Patient location: PACU    Anesthesia Type: general    Transport from OR: Transported from OR on room air with adequate spontaneous ventilation    Post pain: adequate analgesia    Post assessment: no apparent anesthetic complications and tolerated procedure well    Post vital signs: stable    Level of consciousness: awake and alert    Nausea/Vomiting: no nausea/vomiting    Complications: none    Transfer of care protocol was followed      Last vitals:   Visit Vitals  /76 (BP Location: Right arm, Patient Position: Lying)   Pulse 68   Temp 36.5 °C (97.7 °F) (Skin)   Resp 17   Ht 5' (1.524 m)   Wt 49.9 kg (110 lb)   LMP 10/08/2018   SpO2 99%   Breastfeeding? No   BMI 21.48 kg/m²

## 2018-10-29 NOTE — PLAN OF CARE
Chief complaint:   Chief Complaint   Patient presents with   • Heart Problem     PCP:  Felix Wilde MD        Vitals:  Visit Vitals  /82   Pulse 69   Resp 14   Ht 5' 8\" (1.727 m)   Wt 92.5 kg   SpO2 95%   BMI 31.02 kg/m²       HISTORY OF PRESENT ILLNESS     HPI      Problem List    1. Chronic shortness of breath   2. Ejection fraction 61% on 11/17/2017, (nuclear stress test)  3. 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 Pulmonary Hypertension: RVSP 41.1 mmHg.  5. Patent  Foramen Ovale, (last echo showed no PFO)  6. History of RUE DVT  7. Right Diaphragmatic Paralysis  8. Previous Smoker      Aparna Garay a 67 year old  male patient, born 1950, Medical record Number: [id number], was seen in the [facility] at 8:26 AM,on 9/13/2018 for follow-up cardiology visit.    Patient presents for 3-month follow-up with PMH of HFpEF, pulmonary hypertension, PFO. Patient initially presented to cardiology due to newly diagnosed HFmrEF 10/13/2017. Patient had a persistent cough, which is why he initially say Pulmonary Medicine, whom ordered an ECHO showing moderate pulmonary hypertension - RVSP 41.1 mmHg. . EF 46%. Patient has a history of diaphragm paralysis also. NM ST 11/17/17 negative for ischemia and Holter 11/17/17 revealed occasional Nonsustained VT.    At last visit 06/08/2018, patient had continued complaints of shortness of breath however it was baseline for him due to diaphragmic paralysis. However, to eliminate pulmonary HTN as a possible etiology, I advised patient to trial sildenafil.     Patient Active Problem List   Diagnosis   • Personal history of colonic polyps   • Phlebitis and thrombophlebitis of deep veins of upper extremities   • PFO (patent foramen ovale)   • Dizziness   • Numbness   • Pleural plaque   • Pulmonary nodules/lesions, multiple   • Pulmonary asbestosis (CMS/HCC)   • Shortness of breath   • Gastrointestinal hemorrhage   • Pulmonary hypertension (CMS/HCC)       PAST  Has met unit/department guidelines for discharge from each phase of the post procedure continuum   MEDICAL, FAMILY AND SOCIAL HISTORY     Medications:  Current Outpatient Prescriptions   Medication   • citalopram (CELEXA) 40 MG tablet   • polyethylene glycol-electrolytes (NULYTELY WITH FLAVOR PACKS) 420 g solution   • sildenafil (REVATIO) 20 MG tablet   • DISPENSE   • pantoprazole (PROTONIX) 40 MG tablet   • atorvastatin (LIPITOR) 40 MG tablet   • Cholecalciferol (VITAMIN D) 2000 units capsule   • albuterol 108 (90 Base) MCG/ACT inhaler   • prednisoLONE acetate (PRED MILD) 0.12 % ophthalmic suspension     No current facility-administered medications for this visit.      The following histories were personally reviewed and updated.  Allergies, Problem list, Past Medical History, Past Surgical History, Social History and Family History      REVIEW OF SYSTEMS     Review of Systems   Constitutional: Negative for activity change, appetite change, fatigue and unexpected weight change.   Respiratory: Negative for apnea, cough, chest tightness and shortness of breath.    Cardiovascular: Negative for chest pain, palpitations and leg swelling.   Musculoskeletal: Negative for myalgias.   Neurological: Negative for dizziness, syncope and light-headedness.       PHYSICAL EXAM     Physical Exam   Constitutional: He is oriented to person, place, and time. He appears well-developed and well-nourished.   HENT:   Head: Normocephalic and atraumatic.   Eyes: Pupils are equal, round, and reactive to light.   Neck: Normal range of motion. Neck supple. No JVD present.   Cardiovascular: Normal rate, regular rhythm and intact distal pulses.  Exam reveals no gallop and no friction rub.    No murmur heard.  Pulmonary/Chest: Effort normal. He has no decreased breath sounds. He exhibits no tenderness.   Abdominal: Soft.   Musculoskeletal: Normal range of motion. He exhibits no edema.   Neurological: He is alert and oriented to person, place, and time.   Skin: Skin is warm and dry.   Psychiatric: He has a normal mood and affect.   Vitals  reviewed.      NM Stress Test 11/17/2017  1.  Normal myocardial perfusion scans during regadenoson/low-level exercise  and at rest.  2.  Normal resting left ventricular systolic function.  3. Cardiac Risk Assessment: Low.   4. No previous study for comparison.    Holter 11/17/2017  Holter monitoring period documents sinus rhythm with occasional PVCs and short runs of nonsustained ventricular tachycardia .   No other clinically significant arrhythmias.   No significant pauses.   Clinical correlation is urged.    ECHO 09/21/2017  Mildly decreased left ventricular systolic function.  Mildly increased left ventricular wall thickness.  Left ventricular ejection fraction, 46 % is mildly globally reduced.  Global longitudinal strain -16 %.  Normal right ventricular size and systolic function.  Moderately increased left atrial size.  Mild mitral valve regurgitation.  Aortic valve sclerosis without stenosis.  Trace-to-mild tricuspid valve regurgitation.  Mild to moderate pulmonary hypertension, RVSP 41.1 mmHg.  Normal pericardium without effusion.    US Abdominal Aorta 11/08/2016  Proximal abdominal aorta measures 2.6 cm in AP dimension by 3 cm  transversely.  Mid abdominal aorta 1.8 x 2 cm.  Distal abdominal aorta 1.7 x 1.6 cm.  Right common iliac artery 1.1 x 1 cm.  Left common iliac artery 1.2 x 1 cm.  IMPRESSION:  I do not see any aneurysmal dilatation of the abdominal aorta.     ASSESSMENT/PLAN       Recent Labs  Lab 06/04/18  0826 04/27/18  0815 04/27/18  0125 04/26/18  1850 11/27/17  1534 11/02/17  1042 10/16/17  0817  09/14/17  0838   HGB 11.2* 9.8* 10.8* 11.7*  --  14.4 15.3  < >  --    BUN 15 40*  --  42* 17  --   --   --   --    Creatinine 1.01 1.01  --  1.14 1.19*  --   --   --   --    Potassium 4.2 3.7  --  4.1 4.0  --   --   --   --    B-TYPE NATRIURETIC PEPTIDE  --   --   --   --   --   --   --   --  121*   AST/SGOT 40*  --   --  35  --   --   --   --   --    ALT/SGPT 45  --   --  37 58  --   --   --   --     INR  --   --   --  1.1  --   --  1.0  --   --    LDL (Direct)  --   --   --   --  93  --   --   --   --    < > = values in this interval not displayed.    ................................................................  ASSESSMENT AND PLAN:    Aparna Garay presents with the following active problems:    PROBLEMS:    1. HFpEF: LVEF 46%  2. Pulmonary Hypertension: RVSP 41.1 mmHg.  3. Patent  Foramen Ovale  4. History of RUE DVT  5. Right Diaphragmatic Paralysis  6. Hyperlipidemia  7. Previous Smoker    RECOMMENDATIONS:    HFpEF/Pulmonary HTN  Echo 09/21/17: LVEF 46%, RVSP 41.1 mmHg, mild LVH, (last EF 61% on 11/20/2017)  Hx Diapgragmic paralysis  Holter 11/17/2018 revealed some NSVT  NM ST 11/17/17 negative for ischemia  Continued complaints of SOB as last visit, therefore initiated trial of Revatio, Not taking it.  Notes improvement in SOB , last nuclear stress test showed an ejection fraction of 61%  Continued complaints of BLE edema - baseline - furosemide did not improve therefore discontinued     PFO  PMH of PFO   Echo 09/21/17 did not reveal PFO  Continue aspirin daily     Hyperlipidemia  FLP 09/25/14: total 174,   Currently prescribed atorvastatin 80 mg QD  Advised patient to obtain repeat FLP through PCP       FOLLOW UP: Aparna Garay is expected to follow up in 6 months    The documentation recorded by the scribe accurately and completely reflects the service(s) I personally performed and the decisions made by me. I have personally performed the exam, formulated the clinical plan, reviewed, edited the note, and entirely responsible for its content.    Summary:  67 year old male with a history of left upper extremity DVT, and the workup in 2012 patient had the small PFO found with a small right-to-left shunt last echocardiogram which was done did not show PFO.  He comes for follow-up has complaints of shortness of breath.  Ex-smoker, has a right shukri-diaphragm paralysis.  He stopped taking  Revatio, for his pulmonary hypertension, did not help his shortness of breath.  I cannot find where and if he has had right heart cardiac catheterization to document pulmonary hypertension.  He had grade 1 diastolic dysfunction reported in the right ventricular systolic pressure estimated of 41.1 mmHg with moderate left atrial enlargement and mild prolapse of the anterior mitral leaflet and mild mitral valve regurgitation trace aortic regurgitation and trace to mild tricuspid regurgitation.  We will discuss about right heart cardiac catheterization and a MORGAN next visit, to see if patient is agreeable with it.  Continue present medications.  Fasting lipid profile as needed.  Follow-up in 6 months    Tae Parra MD FACP [facility]  Interventional Cardiology  [facility]  9/14/2018,11:36 AM.     Pager#: [phone]  [facility]: [phone]   [facility]: [phone]  [facility]: [phone]  [email or web address]

## 2018-10-30 VITALS
RESPIRATION RATE: 17 BRPM | TEMPERATURE: 98 F | OXYGEN SATURATION: 100 % | WEIGHT: 110 LBS | DIASTOLIC BLOOD PRESSURE: 78 MMHG | HEIGHT: 60 IN | BODY MASS INDEX: 21.6 KG/M2 | HEART RATE: 75 BPM | SYSTOLIC BLOOD PRESSURE: 123 MMHG

## 2018-11-06 ENCOUNTER — OFFICE VISIT (OUTPATIENT)
Dept: OBSTETRICS AND GYNECOLOGY | Facility: CLINIC | Age: 48
End: 2018-11-06
Payer: MEDICARE

## 2018-11-06 VITALS — SYSTOLIC BLOOD PRESSURE: 112 MMHG | WEIGHT: 110 LBS | BODY MASS INDEX: 21.48 KG/M2 | DIASTOLIC BLOOD PRESSURE: 70 MMHG

## 2018-11-06 DIAGNOSIS — Z90.710 STATUS POST LAPAROSCOPIC HYSTERECTOMY: Primary | ICD-10-CM

## 2018-11-06 DIAGNOSIS — N95.8 ARTIFICIAL MENOPAUSE: ICD-10-CM

## 2018-11-06 PROCEDURE — 99024 POSTOP FOLLOW-UP VISIT: CPT | Mod: S$PBB,,, | Performed by: OBSTETRICS & GYNECOLOGY

## 2018-11-06 PROCEDURE — 99213 OFFICE O/P EST LOW 20 MIN: CPT | Mod: PBBFAC,PO | Performed by: OBSTETRICS & GYNECOLOGY

## 2018-11-06 PROCEDURE — 99999 PR PBB SHADOW E&M-EST. PATIENT-LVL III: CPT | Mod: PBBFAC,,, | Performed by: OBSTETRICS & GYNECOLOGY

## 2018-11-06 NOTE — PROGRESS NOTES
Subjective:       Patient ID: Alisson Eldridge is a 48 y.o. female.    Chief Complaint:  Post-op Evaluation      History of Present Illness  HPI  Postoperative Follow-up  Patient presents to the clinic 1 weeks status post total laparoscopic hysterectomy for abnormal uterine bleeding. Eating a regular diet without difficulty. Bowel movements are normal.Bladder function is reported as better than prior to surgery.Patient admits to being very active this past week with several shopping trips and is noticing slight discomfort in right upper quadrant of abdomen.      GYN & OB History  Patient's last menstrual period was 10/08/2018.   Date of Last Pap: 2018    OB History    Para Term  AB Living   3 2 2   1     SAB TAB Ectopic Multiple Live Births   1              # Outcome Date GA Lbr Kahlil/2nd Weight Sex Delivery Anes PTL Lv   3 SAB            2 Term            1 Term                   Review of Systems  Review of Systems   Constitutional: Negative for activity change, appetite change, chills, diaphoresis, fatigue, fever and unexpected weight change.   HENT: Negative for mouth sores and tinnitus.    Eyes: Negative for discharge and visual disturbance.   Respiratory: Negative for cough, shortness of breath and wheezing.    Cardiovascular: Negative for chest pain, palpitations and leg swelling.   Gastrointestinal: Negative for abdominal pain, bloating, blood in stool, constipation, diarrhea, nausea and vomiting.   Endocrine: Negative for diabetes, hair loss, hot flashes, hyperthyroidism and hypothyroidism.   Genitourinary: Negative for decreased libido, dyspareunia, dysuria, flank pain, frequency, genital sores, hematuria, menorrhagia, menstrual problem, pelvic pain, urgency, vaginal bleeding, vaginal discharge, vaginal pain, dysmenorrhea, urinary incontinence, postcoital bleeding, postmenopausal bleeding and vaginal odor.   Musculoskeletal: Negative for back pain, joint swelling and myalgias.   Skin:   Negative for rash, no acne and hair changes.   Neurological: Negative for seizures, syncope, numbness and headaches.   Hematological: Negative for adenopathy. Does not bruise/bleed easily.   Psychiatric/Behavioral: Negative for depression and sleep disturbance. The patient is not nervous/anxious.    Breast: Negative for breast mass, breast pain, nipple discharge and skin changes          Objective:    Physical Exam:   Constitutional: She is oriented to person, place, and time. She appears well-developed and well-nourished. No distress.    HENT:   Head: Normocephalic.    Eyes: Pupils are equal, round, and reactive to light.    Neck: Normal range of motion.    Cardiovascular: Normal rate.     Pulmonary/Chest: Effort normal.        Abdominal: Soft. She exhibits abdominal incision. She exhibits no distension.   Incisions are all without erythema,induration,or discharge.     Genitourinary:   Genitourinary Comments: deferred               Neurological: She is alert and oriented to person, place, and time.    Skin: Skin is warm and dry.    Psychiatric: Judgment and thought content normal.          Assessment:        1. Status post laparoscopic hysterectomy                Plan:      Follow up in 5 weeks

## 2018-11-07 ENCOUNTER — TELEPHONE (OUTPATIENT)
Dept: HEMATOLOGY/ONCOLOGY | Facility: CLINIC | Age: 48
End: 2018-11-07

## 2018-11-07 NOTE — TELEPHONE ENCOUNTER
Message left instructing patient to call [phone] ext [phone] to schedule referral appointment.

## 2018-11-08 ENCOUNTER — TELEPHONE (OUTPATIENT)
Dept: OBSTETRICS AND GYNECOLOGY | Facility: CLINIC | Age: 48
End: 2018-11-08

## 2018-11-08 PROBLEM — N95.8 ARTIFICIAL MENOPAUSE: Status: ACTIVE | Noted: 2018-11-08

## 2018-11-08 RX ORDER — ESTRADIOL 0.5 MG/1
0.5 TABLET ORAL DAILY
Qty: 30 TABLET | Refills: 11 | Status: SHIPPED | OUTPATIENT
Start: 2018-11-08 | End: 2019-08-07

## 2018-11-08 NOTE — TELEPHONE ENCOUNTER
----- Message from Bharti Ball sent at 11/8/2018 12:37 PM CST -----  Contact: Patient  Type: Needs Medical Advice    Who Called:  Patient   Symptoms (please be specific): Hot flashes  How long has patient had these symptoms:  na  Pharmacy name and phone #:   [facility] [address] [address] [address]  [address] [place] [address]  Phone: [phone] Fax: [phone]  Best Call Back Number: [phone]  Additional Information: Calling to request the hormones. Please advise.

## 2018-11-08 NOTE — TELEPHONE ENCOUNTER
Patient has decided she would like to start the hormones that were discussed at the visit on Tuesday. She is having hot flashes at night. Pharmacy reviewed with patient.

## 2018-11-26 ENCOUNTER — HOSPITAL ENCOUNTER (EMERGENCY)
Facility: HOSPITAL | Age: 48
Discharge: HOME | End: 2018-11-26
Attending: EMERGENCY MEDICINE
Payer: MEDICARE

## 2018-11-26 VITALS
HEIGHT: 60 IN | RESPIRATION RATE: 14 BRPM | BODY MASS INDEX: 21.65 KG/M2 | DIASTOLIC BLOOD PRESSURE: 67 MMHG | SYSTOLIC BLOOD PRESSURE: 126 MMHG | OXYGEN SATURATION: 100 % | HEART RATE: 82 BPM | TEMPERATURE: 98 F | WEIGHT: 110.25 LBS

## 2018-11-26 DIAGNOSIS — R09.89 CHRONIC SINUS COMPLAINTS: Primary | ICD-10-CM

## 2018-11-26 PROCEDURE — 99283 EMERGENCY DEPT VISIT LOW MDM: CPT

## 2018-11-26 RX ORDER — FEXOFENADINE HCL AND PSEUDOEPHEDRINE HCI 60; 120 MG/1; MG/1
1 TABLET, EXTENDED RELEASE ORAL DAILY PRN
Qty: 20 TABLET | Refills: 0 | Status: SHIPPED | OUTPATIENT
Start: 2018-11-26 | End: 2018-12-06

## 2018-11-26 NOTE — ED PROVIDER NOTES
Encounter Date: 11/26/2018    SCRIBE #1 NOTE: Destiny CROOKS, am scribing for, and in the presence of, Shalini Ponce .       History     Chief Complaint   Patient presents with    Otalgia     L earache and sinus congestion       Time seen by provider: 4:17 PM on 11/26/2018    Alisson Eldridge is a 48 y.o. female with asthma who presents to the ED congestion onset 1 week. She complains of ear discomfort for 2 days described as pressure worse on the left as well as chills and a productive cough for 4 days. Patient states that secondary to the cough she has had both generalized body aches in her chest and abdomen and a sore throat. She complains of increased sinus discomfort and pressure in her face and forehead. Otherwise she complaints of nausea and denies any vomiting, diarrhea, back pain, neck pain, chest pain, neck pain, numbness, or weakness. Patient explains she she has had chronic sinus and allergy related issues for years however has been unable to see an allergist.      The history is provided by the patient. No  was used.     Review of patient's allergies indicates:   Allergen Reactions    Codeine Nausea And Vomiting    Skelaxin [metaxalone] Other (See Comments)     Burning in throat    Zoloft [sertraline] Rash     Past Medical History:   Diagnosis Date    Allergy     Arthritis     Asthma     Cancer     cervical    CHF (congestive heart failure)     one episode, no follow up    Chronic back pain     Depression     GERD (gastroesophageal reflux disease)     History of psychiatric care     History of psychiatric hospitalization     Menorrhagia with regular cycle 10/11/2018    Migraine     Psychiatric exam requested by authority     Psychiatric problem     Scoliosis     Therapy      Past Surgical History:   Procedure Laterality Date    APPENDECTOMY      COLPOSCOPY      HYSTERECTOMY      HYSTERECTOMY, TOTAL, LAPAROSCOPIC N/A 10/29/2018    Performed by Matthew PRECIADO  MD Anju at [facility] OR    LAPAROSCOPIC SALPINGO-OOPHORECTOMY  10/29/2018    Procedure: SALPINGO-OOPHORECTOMY, LAPAROSCOPIC;  Surgeon: Matthew Rene MD;  Location: [facility] OR;  Service: OB/GYN;;    LAPAROSCOPIC TOTAL HYSTERECTOMY N/A 10/29/2018    Procedure: HYSTERECTOMY, TOTAL, LAPAROSCOPIC;  Surgeon: Matthew Rene MD;  Location: [facility] OR;  Service: OB/GYN;  Laterality: N/A;    MULTIPLE TOOTH EXTRACTIONS      OOPHORECTOMY      SALPINGO-OOPHORECTOMY, LAPAROSCOPIC  10/29/2018    Performed by Matthew Rene MD at [facility] OR    TUBAL LIGATION       Family History   Adopted: Yes   Problem Relation Age of Onset    Breast cancer Mother     Breast cancer Maternal Grandmother      Social History     Tobacco Use    Smoking status: Current Every Day Smoker     Packs/day: 1.00     Types: Cigarettes    Smokeless tobacco: Never Used   Substance Use Topics    Alcohol use: No    Drug use: No     Review of Systems   Constitutional: Positive for chills. Negative for fever.   HENT: Positive for congestion, ear pain, sinus pressure, sinus pain and sore throat (secondary to cough). Negative for drooling, ear discharge, facial swelling, trouble swallowing and voice change.    Eyes: Negative for photophobia and visual disturbance.   Respiratory: Positive for cough (productive). Negative for wheezing.    Cardiovascular: Negative for chest pain and leg swelling.   Gastrointestinal: Positive for nausea. Negative for abdominal pain, diarrhea and vomiting.   Genitourinary: Negative for dysuria and hematuria.   Musculoskeletal: Negative for back pain and neck pain.   Skin: Negative for rash.   Neurological: Negative for dizziness, weakness and numbness.   Hematological: Does not bruise/bleed easily.   Psychiatric/Behavioral: Negative for confusion.       Physical Exam     Initial Vitals [11/26/18 1613]   BP Pulse Resp Temp SpO2   126/67 82 14 98.2 °F (36.8 °C) 100 %      MAP       --         Physical Exam    Nursing note and  vitals reviewed.  Constitutional: She appears well-developed and well-nourished. She is cooperative.  Non-toxic appearance. She does not have a sickly appearance.   HENT:   Head: Normocephalic and atraumatic.   Right Ear: External ear normal.   Left Ear: External ear normal.   Nose: Right sinus exhibits maxillary sinus tenderness. Right sinus exhibits no frontal sinus tenderness. Left sinus exhibits maxillary sinus tenderness. Left sinus exhibits no frontal sinus tenderness.   Mouth/Throat: Uvula is midline and oropharynx is clear and moist. No oropharyngeal exudate, posterior oropharyngeal edema or posterior oropharyngeal erythema.   Eyes: Conjunctivae and lids are normal. Pupils are equal, round, and reactive to light. Right eye exhibits no discharge. Left eye exhibits no discharge.   Neck: Normal range of motion and full passive range of motion without pain. Neck supple.   Cardiovascular: Normal rate, regular rhythm and normal heart sounds. Exam reveals no gallop and no friction rub.    No murmur heard.  Pulmonary/Chest: Breath sounds normal. She has no wheezes. She has no rhonchi. She has no rales.   Abdominal: Soft. Normal appearance. There is no tenderness. There is no rigidity, no rebound and no guarding.   Musculoskeletal: Normal range of motion. She exhibits no tenderness.   Neurological: She is alert and oriented to person, place, and time. She has normal strength. No cranial nerve deficit or sensory deficit. GCS score is 15. GCS eye subscore is 4. GCS verbal subscore is 5. GCS motor subscore is 6.   Skin: Skin is warm, dry and intact. No rash noted.   Psychiatric: She has a normal mood and affect.         ED Course   Procedures  Labs Reviewed - No data to display       Imaging Results    None          Medical Decision Making:   History:   Old Medical Records: I decided to obtain old medical records.  Differential Diagnosis:   Influenza  Pneumonia  Strep pharyngitis  Meningitis  Viral syndrome  Chronic  sinusitis       APC / Resident Notes:   The patient appears to have a viral upper respiratory infection and likely chronic sinusitis.  Based upon the history and physical exam the patient does not appear to have a serious bacterial infection such as pneumonia, sepsis, otitis media, bacterial sinusitis, strep pharyngitis, parapharyngeal or peritonsillar abscess, meningitis.  Patient appears very well and I have given specific return precautions to the patient and/or family members.  The patient can take over the counter medications and does not appear to need antibiotics at this time. Instructed to f/u with ENT and PCP. Pt voices understanding and is agreeable to the plan.  She is given specific return precautions.        Scribe Attestation:   Scribe #1: I performed the above scribed service and the documentation accurately describes the services I performed. I attest to the accuracy of the note.    I, ANAHI Rodriguez, personally performed the services described in this documentation. All medical record entries made by the scribe were at my direction and in my presence.  I have reviewed the chart and agree that the record reflects my personal performance and is accurate and complete. ANAHI Rodriguez.  8:13 PM 11/26/2018           Clinical Impression:   The encounter diagnosis was Chronic sinus complaints.      Disposition:   Disposition: Discharged  Condition: Stable                        Shalini Ponce NP  11/26/18 2013

## 2018-12-11 ENCOUNTER — OFFICE VISIT (OUTPATIENT)
Dept: OBSTETRICS AND GYNECOLOGY | Facility: CLINIC | Age: 48
End: 2018-12-11
Payer: MEDICARE

## 2018-12-11 VITALS
SYSTOLIC BLOOD PRESSURE: 101 MMHG | BODY MASS INDEX: 21.53 KG/M2 | HEART RATE: 83 BPM | WEIGHT: 110.25 LBS | DIASTOLIC BLOOD PRESSURE: 70 MMHG

## 2018-12-11 DIAGNOSIS — Z90.710 STATUS POST LAPAROSCOPIC HYSTERECTOMY: Primary | ICD-10-CM

## 2018-12-11 PROCEDURE — 99999 PR PBB SHADOW E&M-EST. PATIENT-LVL III: CPT | Mod: PBBFAC,,, | Performed by: OBSTETRICS & GYNECOLOGY

## 2018-12-11 PROCEDURE — 99024 POSTOP FOLLOW-UP VISIT: CPT | Mod: POP,,, | Performed by: OBSTETRICS & GYNECOLOGY

## 2018-12-11 PROCEDURE — 99213 OFFICE O/P EST LOW 20 MIN: CPT | Mod: PBBFAC,PO | Performed by: OBSTETRICS & GYNECOLOGY

## 2018-12-11 NOTE — PROGRESS NOTES
Subjective:       Patient ID: Alisson Eldridge is a 48 y.o. female.    Chief Complaint:  Post-op Evaluation      History of Present Illness  HPI  Postoperative Follow-up  Patient presents to the clinic 6 weeks status post total laparoscopic hysterectomy for pelvic pain. Eating a regular diet without difficulty. Bowel movements are normal. The patient is not having any pain.Patient reports painless bleeding episode yesterday. Patient is heavy cigarette smoker and admits to having a smoker's cough.Patient doing well with Estradiol 0.5mg/d      GYN & OB History  Patient's last menstrual period was 10/08/2018.   Date of Last Pap: 2018    OB History    Para Term  AB Living   3 2 2   1     SAB TAB Ectopic Multiple Live Births   1              # Outcome Date GA Lbr Kahlil/2nd Weight Sex Delivery Anes PTL Lv   3 SAB            2 Term            1 Term                   Review of Systems  Review of Systems   Constitutional: Negative for activity change, appetite change, chills, diaphoresis, fatigue, fever and unexpected weight change.   HENT: Negative for nasal congestion, mouth sores and tinnitus.    Eyes: Negative for discharge and visual disturbance.   Respiratory: Negative for cough, shortness of breath and wheezing.    Cardiovascular: Negative for chest pain, palpitations and leg swelling.   Gastrointestinal: Negative for abdominal pain, bloating, blood in stool, constipation, diarrhea, nausea, vomiting, reflux and fecal incontinence.   Endocrine: Negative for diabetes, hair loss, hot flashes, hyperthyroidism and hypothyroidism.   Genitourinary: Positive for vaginal bleeding. Negative for bladder incontinence, decreased libido, dyspareunia, dysuria, flank pain, frequency, genital sores, hematuria, menorrhagia, menstrual problem, pelvic pain, urgency, vaginal discharge, vaginal pain, dysmenorrhea, urinary incontinence, postcoital bleeding, postmenopausal bleeding and vaginal odor.   Musculoskeletal:  Negative for arthralgias, back pain, joint swelling, leg pain and myalgias.   Neurological: Negative for vertigo, seizures, syncope, numbness and headaches.   Hematological: Negative for adenopathy. Does not bruise/bleed easily.   Psychiatric/Behavioral: Negative for depression and sleep disturbance. The patient is not nervous/anxious.    Breast: Negative for asymmetry, breast self exam, lump, mass, mastodynia, nipple discharge, skin changes and tenderness          Objective:    Physical Exam:   Constitutional: She is oriented to person, place, and time. She appears well-developed and well-nourished. No distress.    HENT:   Head: Normocephalic.   Nose: No epistaxis.    Eyes: Pupils are equal, round, and reactive to light.    Neck: Normal range of motion.    Cardiovascular: Normal rate.     Pulmonary/Chest: Effort normal.        Abdominal: Soft. She exhibits no distension. There is no tenderness.     Genitourinary: Vaginal discharge found.   Genitourinary Comments: Dark blood in vault. No active bleeding noted and cuff is intact. No pelvic mass or tenderness. Cuff is well supported           Musculoskeletal: Normal range of motion and moves all extremeties.       Neurological: She is alert and oriented to person, place, and time.    Skin: Skin is warm and dry.    Psychiatric: She has a normal mood and affect. Her behavior is normal. Judgment and thought content normal.          Assessment:        1. Status post laparoscopic hysterectomy                Plan:      Follow up in 3 weeks

## 2019-01-07 ENCOUNTER — HOSPITAL ENCOUNTER (OUTPATIENT)
Dept: RADIOLOGY | Facility: HOSPITAL | Age: 49
Discharge: HOME | End: 2019-01-07
Attending: OBSTETRICS & GYNECOLOGY
Payer: MEDICARE

## 2019-01-07 ENCOUNTER — OFFICE VISIT (OUTPATIENT)
Dept: OBSTETRICS AND GYNECOLOGY | Facility: CLINIC | Age: 49
End: 2019-01-07
Payer: MEDICARE

## 2019-01-07 ENCOUNTER — TELEPHONE (OUTPATIENT)
Dept: OBSTETRICS AND GYNECOLOGY | Facility: CLINIC | Age: 49
End: 2019-01-07

## 2019-01-07 VITALS
DIASTOLIC BLOOD PRESSURE: 62 MMHG | BODY MASS INDEX: 20.84 KG/M2 | WEIGHT: 106.69 LBS | SYSTOLIC BLOOD PRESSURE: 118 MMHG

## 2019-01-07 DIAGNOSIS — N89.8 VAGINAL MASS: ICD-10-CM

## 2019-01-07 DIAGNOSIS — M62.89 PELVIC FLOOR DYSFUNCTION: ICD-10-CM

## 2019-01-07 DIAGNOSIS — R19.00 PELVIC MASS: ICD-10-CM

## 2019-01-07 DIAGNOSIS — Z01.818 PREOP TESTING: Primary | ICD-10-CM

## 2019-01-07 DIAGNOSIS — R19.00 PELVIC MASS: Primary | ICD-10-CM

## 2019-01-07 PROCEDURE — 76830 TRANSVAGINAL US NON-OB: CPT | Mod: TC

## 2019-01-07 PROCEDURE — 72193 CT PELVIS W/DYE: CPT | Mod: TC

## 2019-01-07 PROCEDURE — 99999 PR PBB SHADOW E&M-EST. PATIENT-LVL III: ICD-10-PCS | Mod: PBBFAC,,, | Performed by: OBSTETRICS & GYNECOLOGY

## 2019-01-07 PROCEDURE — 76830 TRANSVAGINAL US NON-OB: CPT | Mod: 26,,, | Performed by: RADIOLOGY

## 2019-01-07 PROCEDURE — 99213 OFFICE O/P EST LOW 20 MIN: CPT | Mod: PBBFAC,PN | Performed by: OBSTETRICS & GYNECOLOGY

## 2019-01-07 PROCEDURE — 99024 POSTOP FOLLOW-UP VISIT: CPT | Mod: POP,,, | Performed by: OBSTETRICS & GYNECOLOGY

## 2019-01-07 PROCEDURE — 76856 US EXAM PELVIC COMPLETE: CPT | Mod: 26,,, | Performed by: RADIOLOGY

## 2019-01-07 PROCEDURE — 76856 US PELVIS COMP WITH TRANSVAG NON-OB (XPD): ICD-10-PCS | Mod: 26,,, | Performed by: RADIOLOGY

## 2019-01-07 PROCEDURE — 76830 US PELVIS COMP WITH TRANSVAG NON-OB (XPD): ICD-10-PCS | Mod: 26,,, | Performed by: RADIOLOGY

## 2019-01-07 PROCEDURE — 25500020 PHARM REV CODE 255: Performed by: OBSTETRICS & GYNECOLOGY

## 2019-01-07 PROCEDURE — 72193 CT PELVIS WITH  CONTRAST: ICD-10-PCS | Mod: 26,,, | Performed by: RADIOLOGY

## 2019-01-07 PROCEDURE — 72193 CT PELVIS W/DYE: CPT | Mod: 26,,, | Performed by: RADIOLOGY

## 2019-01-07 PROCEDURE — 99024 PR POST-OP FOLLOW-UP VISIT: ICD-10-PCS | Mod: POP,,, | Performed by: OBSTETRICS & GYNECOLOGY

## 2019-01-07 PROCEDURE — 99999 PR PBB SHADOW E&M-EST. PATIENT-LVL III: CPT | Mod: PBBFAC,,, | Performed by: OBSTETRICS & GYNECOLOGY

## 2019-01-07 RX ADMIN — IOHEXOL 30 ML: 300 INJECTION, SOLUTION INTRAVENOUS at 02:01

## 2019-01-07 RX ADMIN — IOHEXOL 75 ML: 350 INJECTION, SOLUTION INTRAVENOUS at 04:01

## 2019-01-07 NOTE — TELEPHONE ENCOUNTER
----- Message from Kimberly Miranda sent at 1/7/2019 11:33 AM CST -----  Type: Needs Medical Advice    Who Called:  Radiology CT at [facility]/[place]  Best Call Back Number: [phone] or [phone]  Additional Information: Has a  CT scheduled for today at 3:30pm. They need the order put in for a BUN and a Creatin. Please place order and call to let him know.

## 2019-01-07 NOTE — PROGRESS NOTES
Subjective:       Patient ID: Alisson Eldridge is a 48 y.o. female.    Chief Complaint:  Post-op Evaluation      History of Present Illness  HPI  48 y.o. 2 months post TLH with BSO. Patient reports no problems today and is in for final check for healing of vaginal cuff. Patient reports no problems with bowel or bladder function. Patient reports no bleeding or unusual discharge from vaginal canal.    GYN & OB History  Patient's last menstrual period was 10/08/2018.   Date of Last Pap: 2018    OB History    Para Term  AB Living   3 2 2   1     SAB TAB Ectopic Multiple Live Births   1              # Outcome Date GA Lbr Kahlil/2nd Weight Sex Delivery Anes PTL Lv   3 SAB            2 Term            1 Term                   Review of Systems  Review of Systems   Constitutional: Negative for activity change, appetite change, chills, diaphoresis, fatigue, fever and unexpected weight change.   HENT: Negative for nasal congestion, mouth sores and tinnitus.    Eyes: Negative for discharge and visual disturbance.   Respiratory: Positive for cough. Negative for shortness of breath and wheezing.    Cardiovascular: Negative for chest pain, palpitations and leg swelling.   Gastrointestinal: Negative for abdominal pain, bloating, blood in stool, constipation, diarrhea, nausea, vomiting, reflux and fecal incontinence.   Endocrine: Negative for diabetes, hair loss, hot flashes, hyperthyroidism and hypothyroidism.   Genitourinary: Negative for bladder incontinence, decreased libido, dyspareunia, dysuria, flank pain, frequency, genital sores, hematuria, menorrhagia, menstrual problem, pelvic pain, urgency, vaginal bleeding, vaginal discharge, vaginal pain, dysmenorrhea, urinary incontinence, postcoital bleeding, postmenopausal bleeding and vaginal odor.   Musculoskeletal: Negative for arthralgias, back pain, joint swelling, leg pain and myalgias.   Neurological: Negative for vertigo, seizures, syncope, numbness and  headaches.   Hematological: Negative for adenopathy. Does not bruise/bleed easily.   Psychiatric/Behavioral: Negative for depression and sleep disturbance. The patient is not nervous/anxious.    Breast: Negative for asymmetry, breast self exam, lump, mass, mastodynia, nipple discharge, skin changes and tenderness          Objective:    Physical Exam:   Constitutional: She is oriented to person, place, and time. She appears well-developed and well-nourished. No distress.    HENT:   Head: Normocephalic.   Nose: No epistaxis.    Eyes: Pupils are equal, round, and reactive to light.    Neck: Normal range of motion.    Cardiovascular: Normal rate.     Pulmonary/Chest: Effort normal.        Abdominal: Soft. She exhibits no distension. There is no tenderness.     Genitourinary:   Genitourinary Comments: 3x3 cm cystic mass noted at vaginal cuff. Mass is non tender and not friable            Musculoskeletal: Normal range of motion and moves all extremeties.       Neurological: She is alert and oriented to person, place, and time.    Skin: Skin is warm and dry.    Psychiatric: She has a normal mood and affect. Her behavior is normal. Judgment and thought content normal.          Assessment:        1. Pelvic mass    2. Pelvic floor dysfunction    3. Vaginal mass                Plan:      Pelvic CT with IV and oral contrast as well as pelvic ultrasound  Follow up at office after studies

## 2019-01-11 ENCOUNTER — OFFICE VISIT (OUTPATIENT)
Dept: OBSTETRICS AND GYNECOLOGY | Facility: CLINIC | Age: 49
End: 2019-01-11
Payer: MEDICARE

## 2019-01-11 VITALS — HEIGHT: 61 IN | BODY MASS INDEX: 20.35 KG/M2 | WEIGHT: 107.81 LBS

## 2019-01-11 DIAGNOSIS — N89.8 VAGINAL MASS: Primary | ICD-10-CM

## 2019-01-11 PROBLEM — N94.4 PRIMARY DYSMENORRHEA: Status: RESOLVED | Noted: 2018-10-11 | Resolved: 2019-01-11

## 2019-01-11 PROBLEM — N93.9 ABNORMAL UTERINE AND VAGINAL BLEEDING, UNSPECIFIED: Status: RESOLVED | Noted: 2018-10-29 | Resolved: 2019-01-11

## 2019-01-11 PROBLEM — N92.0 MENORRHAGIA WITH REGULAR CYCLE: Status: RESOLVED | Noted: 2018-10-11 | Resolved: 2019-01-11

## 2019-01-11 PROCEDURE — 99024 POSTOP FOLLOW-UP VISIT: CPT | Mod: S$PBB,,, | Performed by: OBSTETRICS & GYNECOLOGY

## 2019-01-11 PROCEDURE — 17250 CHEM CAUT OF GRANLTJ TISSUE: CPT | Mod: S$PBB,78,, | Performed by: OBSTETRICS & GYNECOLOGY

## 2019-01-11 PROCEDURE — 99213 OFFICE O/P EST LOW 20 MIN: CPT | Mod: PBBFAC,PN | Performed by: OBSTETRICS & GYNECOLOGY

## 2019-01-11 PROCEDURE — 17250 PR CHEM CAUTERY GRANULATN TISSUE: ICD-10-PCS | Mod: S$PBB,78,, | Performed by: OBSTETRICS & GYNECOLOGY

## 2019-01-11 PROCEDURE — 99999 PR PBB SHADOW E&M-EST. PATIENT-LVL III: CPT | Mod: PBBFAC,,, | Performed by: OBSTETRICS & GYNECOLOGY

## 2019-01-11 PROCEDURE — 99999 PR PBB SHADOW E&M-EST. PATIENT-LVL III: ICD-10-PCS | Mod: PBBFAC,,, | Performed by: OBSTETRICS & GYNECOLOGY

## 2019-01-11 PROCEDURE — 99024 PR POST-OP FOLLOW-UP VISIT: ICD-10-PCS | Mod: S$PBB,,, | Performed by: OBSTETRICS & GYNECOLOGY

## 2019-01-11 NOTE — PROGRESS NOTES
Subjective:       Patient ID: Alisson Eldridge is a 48 y.o. female.    Chief Complaint:  follow up to test results      History of Present Illness  HPI  Postoperative Follow-up  Patient presents to the clinic 10 weeks status post total laparoscopic hysterectomy for abnormal uterine bleeding. Eating a regular diet without difficulty. Bowel movements are normal. The patient is not having any pain. Patient had CT and pelvic ultrasound 4 days ago confirming no bowel or bladder involvement with mass seen at apex of vaginal canal.    GYN & OB History  Patient's last menstrual period was 10/08/2018.   Date of Last Pap: 2018    OB History    Para Term  AB Living   3 2 2   1     SAB TAB Ectopic Multiple Live Births   1              # Outcome Date GA Lbr Kahlil/2nd Weight Sex Delivery Anes PTL Lv   3 SAB            2 Term            1 Term                   Review of Systems  Review of Systems   Constitutional: Negative for activity change, appetite change, chills, diaphoresis, fatigue, fever and unexpected weight change.   HENT: Negative for nasal congestion, mouth sores and tinnitus.    Eyes: Negative for discharge and visual disturbance.   Respiratory: Negative for cough, shortness of breath and wheezing.    Cardiovascular: Negative for chest pain, palpitations and leg swelling.   Gastrointestinal: Negative for abdominal pain, bloating, blood in stool, constipation, diarrhea, nausea, vomiting, reflux and fecal incontinence.   Endocrine: Negative for diabetes, hair loss, hot flashes, hyperthyroidism and hypothyroidism.   Genitourinary: Negative for bladder incontinence, decreased libido, dyspareunia, dysuria, flank pain, frequency, genital sores, hematuria, menorrhagia, menstrual problem, pelvic pain, urgency, vaginal bleeding, vaginal discharge, vaginal pain, dysmenorrhea, urinary incontinence, postcoital bleeding, postmenopausal bleeding and vaginal odor.   Musculoskeletal: Negative for arthralgias,  back pain, joint swelling, leg pain and myalgias.   Neurological: Negative for vertigo, seizures, syncope, numbness and headaches.   Hematological: Negative for adenopathy. Does not bruise/bleed easily.   Psychiatric/Behavioral: Positive for depression. Negative for sleep disturbance. The patient is not nervous/anxious.    Breast: Negative for asymmetry, breast self exam, lump, mass, mastodynia, nipple discharge, skin changes and tenderness          Objective:    Physical Exam:   Constitutional: She is oriented to person, place, and time. She appears well-developed and well-nourished. No distress.    HENT:   Head: Normocephalic.   Nose: No epistaxis.    Eyes: Pupils are equal, round, and reactive to light.    Neck: Normal range of motion.    Cardiovascular: Normal rate.     Pulmonary/Chest: Effort normal.          Genitourinary:   Genitourinary Comments: Granulation tissue noted at vaginal cuff. Tissue cauterized with AgNO3 stick. No opening into peritoneal cavity noted.           Musculoskeletal: Normal range of motion and moves all extremeties.       Neurological: She is alert and oriented to person, place, and time.    Skin: Skin is warm and dry.    Psychiatric: She has a normal mood and affect. Her behavior is normal. Judgment and thought content normal.          Assessment:        1. Vaginal mass    2.     Eversion of cuff with no apparent defect            Plan:      Follow up in 2 weeks

## 2019-01-28 ENCOUNTER — OFFICE VISIT (OUTPATIENT)
Dept: OBSTETRICS AND GYNECOLOGY | Facility: CLINIC | Age: 49
End: 2019-01-28
Payer: MEDICARE

## 2019-01-28 VITALS
WEIGHT: 104.75 LBS | DIASTOLIC BLOOD PRESSURE: 62 MMHG | HEIGHT: 60 IN | RESPIRATION RATE: 18 BRPM | SYSTOLIC BLOOD PRESSURE: 102 MMHG | BODY MASS INDEX: 20.56 KG/M2

## 2019-01-28 DIAGNOSIS — N89.8 VAGINAL LESION: Primary | ICD-10-CM

## 2019-01-28 PROCEDURE — 99999 PR PBB SHADOW E&M-EST. PATIENT-LVL III: ICD-10-PCS | Mod: PBBFAC,,, | Performed by: OBSTETRICS & GYNECOLOGY

## 2019-01-28 PROCEDURE — 99213 OFFICE O/P EST LOW 20 MIN: CPT | Mod: S$PBB,,, | Performed by: OBSTETRICS & GYNECOLOGY

## 2019-01-28 PROCEDURE — 99999 PR PBB SHADOW E&M-EST. PATIENT-LVL III: CPT | Mod: PBBFAC,,, | Performed by: OBSTETRICS & GYNECOLOGY

## 2019-01-28 PROCEDURE — 99213 PR OFFICE/OUTPT VISIT, EST, LEVL III, 20-29 MIN: ICD-10-PCS | Mod: S$PBB,,, | Performed by: OBSTETRICS & GYNECOLOGY

## 2019-01-28 PROCEDURE — 99213 OFFICE O/P EST LOW 20 MIN: CPT | Mod: PBBFAC,PN | Performed by: OBSTETRICS & GYNECOLOGY

## 2019-01-28 NOTE — PROGRESS NOTES
Subjective:       Patient ID: Alisson Eldridge is a 48 y.o. female.    Chief Complaint:  Follow-up      History of Present Illness  HPI  48 y.o. for follow up of lesion at vaginal cuff. Patient had TLH with BSO 13 weeks ago and was noted to have polypoid mass at vaginal cuff several weeks ago. Ultrasound and CT scan revealed apparent polyp. Patient reports no discharge or bleeding per vagina.    GYN & OB History  Patient's last menstrual period was 10/08/2018.   Date of Last Pap: 2018    OB History    Para Term  AB Living   3 2 2   1     SAB TAB Ectopic Multiple Live Births   1              # Outcome Date GA Lbr Kahlil/2nd Weight Sex Delivery Anes PTL Lv   3 SAB            2 Term            1 Term                   Review of Systems  Review of Systems   Constitutional: Negative for activity change, appetite change, chills, diaphoresis, fatigue, fever and unexpected weight change.   HENT: Negative for nasal congestion, mouth sores and tinnitus.    Eyes: Negative for discharge and visual disturbance.   Respiratory: Negative for cough, shortness of breath and wheezing.    Cardiovascular: Negative for chest pain, palpitations and leg swelling.   Gastrointestinal: Negative for abdominal pain, bloating, blood in stool, constipation, diarrhea, nausea, vomiting, reflux and fecal incontinence.   Endocrine: Negative for diabetes, hair loss, hot flashes, hyperthyroidism and hypothyroidism.   Genitourinary: Negative for bladder incontinence, decreased libido, dyspareunia, dysuria, flank pain, frequency, genital sores, hematuria, menorrhagia, menstrual problem, pelvic pain, urgency, vaginal bleeding, vaginal discharge, vaginal pain, dysmenorrhea, urinary incontinence, postcoital bleeding, postmenopausal bleeding and vaginal odor.   Musculoskeletal: Negative for arthralgias, back pain, joint swelling, leg pain and myalgias.   Neurological: Negative for vertigo, seizures, syncope, numbness and headaches.    Hematological: Negative for adenopathy. Does not bruise/bleed easily.   Psychiatric/Behavioral: Negative for depression and sleep disturbance. The patient is not nervous/anxious.    Breast: Negative for asymmetry, breast self exam, lump, mass, mastodynia, nipple discharge, skin changes and tenderness          Objective:    Physical Exam:   Constitutional: She is oriented to person, place, and time. She appears well-developed and well-nourished. No distress.    HENT:   Head: Normocephalic.   Nose: No epistaxis.    Eyes: Pupils are equal, round, and reactive to light.    Neck: Normal range of motion. Neck supple.    Cardiovascular: Normal rate.     Pulmonary/Chest: Effort normal.        Abdominal: Soft. She exhibits no distension. There is no tenderness.     Genitourinary: No vaginal discharge found.   Genitourinary Comments: 2 mm polypoid lesion at cuff. AgNO3 stick used to cauterize lesion.           Musculoskeletal: Normal range of motion and moves all extremeties.       Neurological: She is alert and oriented to person, place, and time.    Skin: Skin is warm and dry.    Psychiatric: She has a normal mood and affect. Her behavior is normal. Judgment and thought content normal.          Assessment:        1. Vaginal lesion                Plan:      Annual exams   Resume intercourse in 1 week

## 2019-02-27 ENCOUNTER — DOCUMENTATION ONLY (OUTPATIENT)
Dept: FAMILY MEDICINE | Facility: CLINIC | Age: 49
End: 2019-02-27

## 2019-02-27 NOTE — PROGRESS NOTES
Patient is alert to self, pleasantly confused. Patient denies pain. Patient up with one assist and walker to chair. Bed and chair alarm on for safety. Cardiology consulted and pulmonology at bedside to see patient early this afternoon.  Vitals are stable, p Pre-Visit Chart Review  For Appointment Scheduled on 02/27/19    Health Maintenance Due   Topic Date Due    Lipid Panel  1970    TETANUS VACCINE  08/20/1987    Pneumococcal Vaccine (Medium Risk) (1 of 1 - PPSV23) 08/20/1988    Influenza Vaccine  08/01/2018

## 2019-03-01 ENCOUNTER — TELEPHONE (OUTPATIENT)
Dept: FAMILY MEDICINE | Facility: CLINIC | Age: 49
End: 2019-03-01

## 2019-03-01 NOTE — TELEPHONE ENCOUNTER
I have attempted without success to contact this patient by phone, I left a message on answering machine. Will try again later.      REASON FOR CALLING:     Reschedule Appointment.

## 2019-03-04 ENCOUNTER — TELEPHONE (OUTPATIENT)
Dept: FAMILY MEDICINE | Facility: CLINIC | Age: 49
End: 2019-03-04

## 2019-03-04 NOTE — TELEPHONE ENCOUNTER
HPI:  Lucy Cheng is a 47-year-old female who presents to the clinic complaining of insidious onset of fatigue of couple of months duration. She feels that her symptoms could be related to stress however she is interested in workup to rule out other etiology.    She complains of insidious onset of headaches over the past few weeks that she believes is related to stress. She denies nasal congestion post nasal drip. No blurred vision double vision focal motor weaknesses or sensory deficits. No neck pain or stiffness.    She denies chest pain, shortness breath or palpitations. She denies abdominal pain nausea vomiting diarrhea or constipation. She denies any frequency urgency or dysuria. Patient has history of atrophic kidney and recurrent UTIs. She has consulted before with urology.    Patient complains of skin tag on the right anterior trunk. Patient is concerned as it bothers her with clothing and her bra and would like to have that removed today.    PMHX:  Past Medical History:   Diagnosis Date   • Anemia    • Atrophic kidney     left   • Hypertrophic kidney     right   • Ovarian cyst    • Recurrent UTI        MEDICATIONS:   Current Outpatient Prescriptions   Medication Sig Dispense Refill   • HYDROcodone-acetaminophen (NORCO) 5-325 MG per tablet Take 1.5 tablets by mouth every 8 hours for severe pain.  (Max 4.5/day) \"Caution this medication will make you sleepy\" 135 tablet 0   • baclofen (LIORESAL) 10 MG tablet  Take 1/2 to 1 tablet at night as needed for muscle spasm. 30 tablet 1   • ranitidine (ZANTAC) 150 MG tablet Take 1 tablet by mouth 2 times daily. 60 tablet 6   • diclofenac (VOLTAREN) 1 % gel Apply 2 grams topically 4 times daily 300 g 1   • gabapentin (NEURONTIN) 600 MG tablet take 1/2 tab by mouth nightly 30 tablet 3   • diphenhydrAMINE (BENADRYL) 25 MG tablet Take 1 tablet by mouth every 6 hours as needed for Itching. With hydrocodone 90 tablet 5   • Cranberry 500 MG Cap 1 cap daily 30 capsule 1   •  Left message for patient to return call.      Pt informed that she can take Zyterc. Pt also informed she could try Mucinex for her cold. Pt denies fever. Runny nose, congestion and fatigue.   Pt asked abut her TSH and T4. Informed of results. Pt has appointment with Endo on 2/1/19. Pt wants to talk to CNM regarding her lab results. Upset that she was not called about last blood draw. Informed that would be discussed with endo at appointment. Wants to discuss with CNM.   Routing to CNM to review.    ferrous sulfate 324 (65 FE) MG EC tablet Take 324 mg by mouth daily.     • Cholecalciferol (VITAMIN D PO) Take 1 tablet by mouth daily.     • Multiple Vitamins-Minerals (MULTIVITAMINS) CHEW Chew 1 tablet by mouth daily. 30 tablet 5   • Copper IUD Use as directed.     • acetaminophen (TYLENOL) 325 MG tablet Take 650 mg by mouth daily as needed for Pain. Dose: 2 tablets (=650mg) 30 tablet 0   • polyethylene glycol (MIRALAX) powder Take 17 g by mouth daily. 255 g 0     No current facility-administered medications for this visit.        ALLERGIES:   ALLERGIES:   Allergen Reactions   • Hydrocodone Other (See Comments)     itchiness       SOCIAL HISTORY:  Social History     Social History   • Marital status:      Spouse name: N/A   • Number of children: N/A   • Years of education: N/A     Occupational History   • Not on file.     Social History Main Topics   • Smoking status: Never Smoker   • Smokeless tobacco: Never Used   • Alcohol use No   • Drug use: No   • Sexual activity: Yes     Partners: Male     Birth control/ protection: IUD      Comment:      Other Topics Concern   • Seat Belt Yes   • Self-Exams Yes     Social History Narrative   • No narrative on file       PHYSICAL EXAMINATION:   GENERAL: The patient is alert, awake, oriented, in no acute distress.   VITAL SIGNS:  Visit Vitals  /72   Pulse 72   Temp 97.6 °F (36.4 °C) (Oral)   Resp 14   Ht 5' 6\" (1.676 m)   Wt 93.5 kg   SpO2 99%   BMI 33.27 kg/m²       HEENT: Pupils equal, round and reactive to light and accommodation. Extraocular muscles intact. Anicteric sclerae. Tympanic membranes clear. Throat clear.   NECK: Supple with full range of motion, no submandibular, cervical, supraclavicular lymphadenopathy, no thyromegaly, no jugular venous distension or audible bruit.   HEART: Regular rate and rhythm, no S3 or S4, no murmurs.   LUNGS: Clear to auscultation bilaterally. No rhonchi or rales. Adequate air entry. Normal respiratory effort.    ABDOMEN: Soft, nontender, nondistended with audible bowel sounds. No hepatosplenomegaly, no masses.   EXTREMITIES: No edema, clubbing or cyanosis. Dorsalis pedis 2+ bilaterally.   Skin: Moist and warm with pedunculated lesion on the right trunk under the breast about 3 mm in diameter.    Lab Services on 11/01/2017   Component Date Value   • WBC 11/01/2017 5.8    • RBC 11/01/2017 3.32*   • HGB 11/01/2017 8.4*   • HCT 11/01/2017 26.7*   • MCV 11/01/2017 80.4    • MCH 11/01/2017 25.3*   • MCHC 11/01/2017 31.5*   • RDW-CV 11/01/2017 15.5*   • PLT 11/01/2017 199    • DIFF TYPE 11/01/2017 AUTOMATED DIFFERENTIAL    • Neutrophil 11/01/2017 66    • LYMPH 11/01/2017 23    • MONO 11/01/2017 9    • EOSIN 11/01/2017 1    • BASO 11/01/2017 1    • Absolute Neutrophil 11/01/2017 3.9    • Absolute Lymph 11/01/2017 1.3    • Absolute Mono 11/01/2017 0.5    • Absolute Eos 11/01/2017 0.1    • Absolute Baso 11/01/2017 0.0    • Fasting Status 11/01/2017 12    • Sodium 11/01/2017 141    • Potassium 11/01/2017 4.8    • Chloride 11/01/2017 106    • Carbon Dioxide 11/01/2017 25    • Anion Gap 11/01/2017 15    • Glucose 11/01/2017 80    • BUN 11/01/2017 11    • Creatinine 11/01/2017 0.73    • GFR Estimate,  Am* 11/01/2017 >90    • GFR Estimate, Non Ana* 11/01/2017 >90    • BUN/Creatinine Ratio 11/01/2017 15    • CALCIUM 11/01/2017 8.6    • TOTAL BILIRUBIN 11/01/2017 0.5    • AST/SGOT 11/01/2017 14    • ALT/SGPT 11/01/2017 23    • ALK PHOSPHATASE 11/01/2017 40*   • TOTAL PROTEIN 11/01/2017 6.9    • Albumin 11/01/2017 3.6    • GLOBULIN 11/01/2017 3.3    • A/G Ratio, Serum 11/01/2017 1.1    • FASTING STATUS 11/01/2017 12    • CHOLESTEROL 11/01/2017 140    • CALCULATED LDL 11/01/2017 73    • HDL 11/01/2017 42*   • TRIGLYCERIDE 11/01/2017 127    • CALCULATED NON HDL 11/01/2017 98    • CHOL/HDL 11/01/2017 3.3    • COLOR 11/01/2017 YELLOW    • APPEARANCE 11/01/2017 CLEAR    • GLUCOSE(URINE) 11/01/2017 NEGATIVE    • BILIRUBIN 11/01/2017  NEGATIVE    • KETONES 11/01/2017 NEGATIVE    • SPECIFIC GRAVITY 11/01/2017 1.025    • BLOOD 11/01/2017 TRACE*   • pH 11/01/2017 5.5    • PROTEIN(URINE) 11/01/2017 30*   • UROBILINOGEN 11/01/2017 0.2    • NITRITE 11/01/2017 NEGATIVE    • LEUKOCYTE ESTERASE 11/01/2017 NEGATIVE    • Squamous EPI'S 11/01/2017 1 to 5    • RBC 11/01/2017 1 to 3    • WBC 11/01/2017 1 to 5    • BACTERIA 11/01/2017 MODERATE*   • Hyaline Casts 11/01/2017 NONE SEEN    • SPECIMEN TYPE 11/01/2017 URINE, CLEAN CATCH/MIDSTREAM    • MUCOUS 11/01/2017 PRESENT        Assessment and plan   (R53.81,  R53.83) Malaise and fatigue  (primary encounter diagnosis)  Plan: CBC & AUTO DIFFERENTIAL, COMPREHENSIVE         METABOLIC PANEL,   Comment: patient had TSH and vitamin D checked couple of months ago.    (N26.1) Atrophic kidney  Plan: URINALYSIS WITH MICRO & CULTURE IF INDICATED,         URINE CULTURE  Avoid nephrotoxic medications  Avoid nonsteroidal anti-inflammatory in particular    (R51) Nonintractable headache, unspecified chronicity pattern, unspecified headache type  Comment: Declines referral to neurology at that time  Plan: Increase fluid intake  THE clinic for referral to neurology if symptoms persist    (Z13.220) Need for lipid screening  Plan: LIPID PANEL WITH REFLEX    (L91.8) Skin tag   We have discussed methods of removal including excision versus destruction with liquid nitrogen. Patient requests destruction with liquid nitrogen as her daughters had the procedure done by myself before. Patient was advised regarding possible risks including but not limited to pain, scarring, blistering, failure of removal and recurrence. Patient verbalized understanding and consent was obtained. I have applied liquid nitrogen for 10 seconds to the lesion. Patient tolerated well. Patient was advised to avoid soaking in water and apply Neosporin should a blister forms and open. She is to call with any concerns.

## 2019-03-04 NOTE — TELEPHONE ENCOUNTER
----- Message from Yolie Barnhart sent at 3/4/2019  9:46 AM CST -----  Contact: Patient  Patient is calling because she had a 10:00 appt and is trying to see if she can be seen a little later today because she was stopped on her way in to her appt.  Call Back#[phone]  Thanks

## 2019-03-04 NOTE — TELEPHONE ENCOUNTER
----- Message from Paloma Kilpatrick sent at 3/4/2019 10:26 AM CST -----  Type:  Patient Returning Call    Who Called:  pt  Who Left Message for Patient:  nurse  Does the patient know what this is regarding?:  Fitting  Pt  In  For an mirielle   Best Call Back Number: [phone]  Additional Information:   Pt  Does  Not  Want to  Come in today and  Will  Call  back

## 2019-03-28 ENCOUNTER — HOSPITAL ENCOUNTER (OUTPATIENT)
Dept: RADIOLOGY | Facility: HOSPITAL | Age: 49
Discharge: HOME | End: 2019-03-28
Attending: NURSE PRACTITIONER
Payer: MEDICARE

## 2019-03-28 ENCOUNTER — OFFICE VISIT (OUTPATIENT)
Dept: OTOLARYNGOLOGY | Facility: CLINIC | Age: 49
End: 2019-03-28
Payer: MEDICARE

## 2019-03-28 ENCOUNTER — CLINICAL SUPPORT (OUTPATIENT)
Dept: AUDIOLOGY | Facility: CLINIC | Age: 49
End: 2019-03-28
Payer: MEDICARE

## 2019-03-28 ENCOUNTER — TELEPHONE (OUTPATIENT)
Dept: OTOLARYNGOLOGY | Facility: CLINIC | Age: 49
End: 2019-03-28

## 2019-03-28 VITALS — WEIGHT: 106.94 LBS | HEIGHT: 60 IN | BODY MASS INDEX: 21 KG/M2

## 2019-03-28 DIAGNOSIS — R05.3 CHRONIC COUGH: ICD-10-CM

## 2019-03-28 DIAGNOSIS — Z72.0 TOBACCO ABUSE: ICD-10-CM

## 2019-03-28 DIAGNOSIS — H92.09 OTALGIA, UNSPECIFIED LATERALITY: Primary | ICD-10-CM

## 2019-03-28 DIAGNOSIS — R07.0 THROAT DISCOMFORT: ICD-10-CM

## 2019-03-28 DIAGNOSIS — J06.9 URTI (ACUTE UPPER RESPIRATORY INFECTION): Primary | ICD-10-CM

## 2019-03-28 DIAGNOSIS — R09.81 CHRONIC NASAL CONGESTION: ICD-10-CM

## 2019-03-28 DIAGNOSIS — J06.9 URTI (ACUTE UPPER RESPIRATORY INFECTION): ICD-10-CM

## 2019-03-28 DIAGNOSIS — K21.9 LPRD (LARYNGOPHARYNGEAL REFLUX DISEASE): ICD-10-CM

## 2019-03-28 DIAGNOSIS — J34.3 HYPERTROPHY OF BOTH INFERIOR NASAL TURBINATES: ICD-10-CM

## 2019-03-28 DIAGNOSIS — J34.2 NASAL SEPTAL DEVIATION: ICD-10-CM

## 2019-03-28 DIAGNOSIS — H92.01 REFERRED OTALGIA OF RIGHT EAR: ICD-10-CM

## 2019-03-28 PROCEDURE — 71046 XR CHEST PA AND LATERAL: ICD-10-PCS | Mod: 26,,, | Performed by: RADIOLOGY

## 2019-03-28 PROCEDURE — 99215 OFFICE O/P EST HI 40 MIN: CPT | Mod: S$PBB,,, | Performed by: NURSE PRACTITIONER

## 2019-03-28 PROCEDURE — 71046 X-RAY EXAM CHEST 2 VIEWS: CPT | Mod: 26,,, | Performed by: RADIOLOGY

## 2019-03-28 PROCEDURE — 99999 PR PBB SHADOW E&M-EST. PATIENT-LVL IV: CPT | Mod: PBBFAC,,, | Performed by: NURSE PRACTITIONER

## 2019-03-28 PROCEDURE — 70220 X-RAY EXAM OF SINUSES: CPT | Mod: 26,,, | Performed by: RADIOLOGY

## 2019-03-28 PROCEDURE — 92567 TYMPANOMETRY: CPT | Mod: PBBFAC,PO | Performed by: AUDIOLOGIST-HEARING AID FITTER

## 2019-03-28 PROCEDURE — 99214 OFFICE O/P EST MOD 30 MIN: CPT | Mod: PBBFAC,25,PO | Performed by: NURSE PRACTITIONER

## 2019-03-28 PROCEDURE — 99215 PR OFFICE/OUTPT VISIT, EST, LEVL V, 40-54 MIN: ICD-10-PCS | Mod: S$PBB,,, | Performed by: NURSE PRACTITIONER

## 2019-03-28 PROCEDURE — 70220 X-RAY EXAM OF SINUSES: CPT | Mod: TC,FY,PO

## 2019-03-28 PROCEDURE — 99999 PR PBB SHADOW E&M-EST. PATIENT-LVL IV: ICD-10-PCS | Mod: PBBFAC,,, | Performed by: NURSE PRACTITIONER

## 2019-03-28 PROCEDURE — 70220 XR SINUSES MIN 3 VIEWS: ICD-10-PCS | Mod: 26,,, | Performed by: RADIOLOGY

## 2019-03-28 PROCEDURE — 71046 X-RAY EXAM CHEST 2 VIEWS: CPT | Mod: TC,FY,PO

## 2019-03-28 RX ORDER — MINERAL OIL
180 ENEMA (ML) RECTAL DAILY
COMMUNITY
End: 2020-06-04

## 2019-03-28 RX ORDER — FLUTICASONE PROPIONATE 50 MCG
SPRAY, SUSPENSION (ML) NASAL
Refills: 12 | COMMUNITY
Start: 2019-03-01 | End: 2019-10-04 | Stop reason: ALTCHOICE

## 2019-03-28 RX ORDER — AZELASTINE 1 MG/ML
SPRAY, METERED NASAL
Refills: 12 | COMMUNITY
Start: 2019-03-04 | End: 2019-10-04 | Stop reason: ALTCHOICE

## 2019-03-28 RX ORDER — IPRATROPIUM BROMIDE 42 UG/1
SPRAY, METERED NASAL
Refills: 5 | COMMUNITY
Start: 2019-03-01 | End: 2019-10-04 | Stop reason: ALTCHOICE

## 2019-03-28 NOTE — PATIENT INSTRUCTIONS
"Chronic nasal congestion is usually either: (1) anatomical/structural or (2) inflammatory in nature.     (1) If anatomical/structural, an ENT surgeon can discuss whether nasal surgery is indicated. Sometimes it is a matter of correcting a deviated septum or making the nasal turbinates smaller. Sometimes it is a matter of removing nasal polyps or clearing out the sinuses.     (2) If it is inflammatory, a steroid nasal spray and daily nasal saline rinsing is recommended. Consideration of allergy testing with the possibility of allergy shots may be warranted. A daily antihistamine is also recommended. Over-the-counter nasal decongestants are NOT recommended for more than 2-3 days.     There is a wide variety of non-surgical nasal options, including but not limited to: nasal steroids twice daily, Ponaris nasal emollient twice daily, humidifier/diffuser, "Mute" nasal cuffs, "MedCline" wedge pillow, etc. and if after two months of all of the above, patient is still unsatisfied with nasal breathing, then consider seeing ENT surgeon to discuss possible surgical treatment options.     Top Concerns for Chronic Cough:     1. Nasal allergies -- Typical constellation of symptoms seen with nasal allergies: itchy, red, watery eyes; itchy, red, watery nose; excessive sneezing; excessive stuffiness. If this one best describes your current state, then discuss with your primary care provider whether you should see an allergist or take daily allergy medications.     2. Sinus Infection -- Typical constellation of symptoms seen with acute bacterial sinus infection are:  Green-gold, foul-smelling, foul-tasting mucus from nose and throat, inability to breathe through nose, inability to smell or taste well, facial pain and swelling, dental pain, headaches around eyes, sore throat and productive cough. If this one best describes your current state, then let's get sinus imaging to rule out infection.     3.  Silent reflux -- Typical " "constellation of symptoms seen with silent reflux: post-nasal drip sensation with absence of significant runny nose or nasal congestion, sensation of thick or too much mucus in the back of throat, raspy voice, frequent throat clearing, lump in the back of throat, frequent sore throats. If this one best describes your current state, discuss with your primary care provider whether you should see a gastroenterologist or take daily reflux medications. Your GI doctor may want to do an Upper GI or obtain a barium swallow or pH monitoring test.     4. Asthma/COPD/Emphysema/Pulmonary (Lung) issue -- Typical constellation of symptoms: wheezing, shortness of breath. Discuss with your primary care provider whether you should see a pulmonologist (lung specialist) and have Pulmonary Function Testing (PFTs) done.     The "gold standard" for determining presence or absence of middle ear fluid is tympanometry. You have Type "A" tympanograms, which is consistent with well-aerated middle ear spaces and absence of middle ear fluid.   There is no middle ear fluid/infection at this time.   Other possible causes for continued ear pain can include but are not limited to:   · dental or jaw joint problem (jaw joint arthritis) -- see your dentist  · cervical spine arthritis (discuss possible imaging/MRI with PCP)  · heck/neck neoplasms (CT neck w/contrast)  · myofascial pain syndrome/headaches or neuralgias (see your neurologist)  · Shingles (would break out in a painful, red, blistering rash on one side)  · GERD (anti-acid reflux medications twice daily and see your GI)      "

## 2019-03-28 NOTE — TELEPHONE ENCOUNTER
----- Message from Siena Thomas NP sent at 3/28/2019 12:19 PM CDT -----  Sinuses are all open and clear. There is no fluid in any of her sinuses. No infection.

## 2019-03-28 NOTE — PROGRESS NOTES
Alisson Eldridge was seen 03/28/2019 for tympanometry per order from Siena Thomas due to complaint of ear pain. Pt stated it is more painful at night when the pressure is greater. Results indicate Type A for the right ear indicating normal middle ear function and Type Ad for the left ear indicating abnormal middle ear function.     Results will be reviewed by ENT following this encounter.

## 2019-03-28 NOTE — PROGRESS NOTES
Subjective:       Patient ID: Alisson Eldridge is a 48 y.o. female.    Chief Complaint: Nasal Congestion; Otalgia (rt ear  (sharp pain) wakes her in the middle of the night); and Cough    Otalgia    Associated symptoms include coughing, headaches, rhinorrhea and a sore throat.   Cough   Associated symptoms include ear pain (right, PM), headaches, postnasal drip, rhinorrhea and a sore throat. Pertinent negatives include no eye redness.      Patient returns for chronic nasal congestion. She is unable to breathe through the left side of her nose. She has not been using her nasal sprays.    Additionally patient reports sharp pains AD that wake her up in the middle of the night. She has h/o chronic migraines and scoliosis. (+)Pain behind eyes.   Additionally patient reports sore throat X 4 days. No fever. Rhinorrhea. When she leans her head over, she feels like she is drowning.   Lastly she reports chronic cough. Referral to smoking cessation program was previously placed. She reports (+)h/o asthma. Patient continues to smoke, X 35 years. She has been taking her omeprazole QAM.     Review of Systems   Constitutional: Negative.    HENT: Positive for congestion (left side, chronic), ear pain (right, PM), postnasal drip, rhinorrhea, sneezing and sore throat. Negative for dental problem, facial swelling and trouble swallowing.    Eyes: Negative.  Negative for discharge, redness and itching.   Respiratory: Positive for cough. Negative for choking.    Cardiovascular: Negative.    Gastrointestinal: Negative.    Musculoskeletal: Negative.    Skin: Negative.    Neurological: Positive for headaches.   Hematological: Negative.    Psychiatric/Behavioral: Negative.        Objective:      Physical Exam   Constitutional: She is oriented to person, place, and time. Vital signs are normal. She appears well-developed and well-nourished. She is cooperative. She does not appear ill. No distress.   HENT:   Head: Normocephalic and  "atraumatic.   Right Ear: Hearing, tympanic membrane, external ear and ear canal normal. No drainage or swelling. Tympanic membrane is not erythematous. Tympanic membrane mobility is normal. No middle ear effusion.   Left Ear: Hearing, tympanic membrane, external ear and ear canal normal. No drainage or swelling. Tympanic membrane is not erythematous. Tympanic membrane mobility is normal.  No middle ear effusion.   Nose: Mucosal edema (left inferior turbinate hypertrophy, responded well to vasoconstrictor decongestant) and septal deviation (mildly to left) present. No rhinorrhea. Right sinus exhibits no maxillary sinus tenderness and no frontal sinus tenderness. Left sinus exhibits no maxillary sinus tenderness and no frontal sinus tenderness.   Mouth/Throat: Uvula is midline, oropharynx is clear and moist and mucous membranes are normal. Mucous membranes are not pale, not dry and not cyanotic. She has dentures (edentulous). No oral lesions. No oropharyngeal exudate, posterior oropharyngeal edema or posterior oropharyngeal erythema.   Type "A" tympanogram consistent with well-pneumatized mesotympanum and absence of middle ear effusion AU.   Eyes: Pupils are equal, round, and reactive to light. Conjunctivae, EOM and lids are normal. Right eye exhibits no discharge. Left eye exhibits no discharge. No scleral icterus.   Neck: Trachea normal and normal range of motion. Neck supple. No tracheal deviation present. No thyroid mass and no thyromegaly present.   Cardiovascular: Normal rate.   Pulmonary/Chest: Effort normal. No stridor. No respiratory distress. She has no wheezes.   Musculoskeletal: Normal range of motion.   Lymphadenopathy:        Head (right side): No submental, no submandibular, no tonsillar, no preauricular and no posterior auricular adenopathy present.        Head (left side): No submental, no submandibular, no tonsillar, no preauricular and no posterior auricular adenopathy present.     She has no " "cervical adenopathy.        Right cervical: No superficial cervical and no posterior cervical adenopathy present.       Left cervical: No superficial cervical and no posterior cervical adenopathy present.   Neurological: She is alert and oriented to person, place, and time. She has normal strength. Coordination and gait normal.   Skin: Skin is warm, dry and intact. No lesion and no rash noted. She is not diaphoretic. No cyanosis. No pallor.   Psychiatric: She has a normal mood and affect. Her speech is normal and behavior is normal. Judgment and thought content normal. Cognition and memory are normal.   Nursing note and vitals reviewed.      Assessment:     Referred right otalgia, not otogenic  Mildly deviated septum to left with inferior turbinate hypertrophy L>R  Tobacco abuse  H/o LPRD  H/o asymmetrical SNHL AU, negative MRI-IAC 9/17/18  Plan:     Referred right otalgia:  Reassured pt negative aural exam with type "A" tympanograms AU. Discussed other possible causes for continued ear pain can include but are not limited to:   · dental pathology or TMJ (jaw joint arthritis) -- see your dentist  · cervical spine arthritis (discuss possible imaging/MRI with PCP)  · heck/neck neoplasms (CT neck w/contrast)  · myofascial pain syndrome/headaches or neuralgias (see neurologist)  · Shingles (would break out in a painful, red, blistering rash on one side)  · GERD (anti-acid reflux medications twice daily and see your GI)  Chronic nasal congestion:   May continue Flonase & Astelin BID, and saline rinsing. If still unsatisfied with nasal breathing, she may want to consider seeing an ENT surgeon to discuss possible septo/SMRTs if surgeon deems appropriate.     Referral to our smoking cessation clinic was previously placed. Pt is encouraged to go.   Sinus and chest x-rays today were negative for upper or lower respiratory infection. Her chronic cough is likely multifactorial: smoking, GERD, reported h/o asthma  Discussed " common differentials for chronic cough may include but not limited to: allergic rhinitis (see allergist or take daily allergy meds), silent reflux (see GI or take daily reflux meds), sinusitis (imaging), pulmonary issues (see pulmonologist), ACEi.   Continue PPI QAM on an empty stomach. Add ranitidine QHS.

## 2019-08-07 ENCOUNTER — OFFICE VISIT (OUTPATIENT)
Dept: FAMILY MEDICINE | Facility: CLINIC | Age: 49
End: 2019-08-07
Payer: MEDICARE

## 2019-08-07 VITALS
SYSTOLIC BLOOD PRESSURE: 122 MMHG | HEART RATE: 86 BPM | BODY MASS INDEX: 21.51 KG/M2 | HEIGHT: 60 IN | TEMPERATURE: 98 F | WEIGHT: 109.56 LBS | DIASTOLIC BLOOD PRESSURE: 76 MMHG

## 2019-08-07 DIAGNOSIS — R07.9 CHEST PAIN, UNSPECIFIED TYPE: Primary | ICD-10-CM

## 2019-08-07 PROCEDURE — 99999 PR PBB SHADOW E&M-EST. PATIENT-LVL IV: ICD-10-PCS | Mod: PBBFAC,,, | Performed by: NURSE PRACTITIONER

## 2019-08-07 PROCEDURE — 93010 ELECTROCARDIOGRAM REPORT: CPT | Mod: S$PBB,,, | Performed by: INTERNAL MEDICINE

## 2019-08-07 PROCEDURE — 99999 PR PBB SHADOW E&M-EST. PATIENT-LVL IV: CPT | Mod: PBBFAC,,, | Performed by: NURSE PRACTITIONER

## 2019-08-07 PROCEDURE — 99214 OFFICE O/P EST MOD 30 MIN: CPT | Mod: S$PBB,,, | Performed by: NURSE PRACTITIONER

## 2019-08-07 PROCEDURE — 99214 OFFICE O/P EST MOD 30 MIN: CPT | Mod: PBBFAC,PO | Performed by: NURSE PRACTITIONER

## 2019-08-07 PROCEDURE — 93005 ELECTROCARDIOGRAM TRACING: CPT | Mod: PBBFAC,PO | Performed by: INTERNAL MEDICINE

## 2019-08-07 PROCEDURE — 93010 EKG 12-LEAD: ICD-10-PCS | Mod: S$PBB,,, | Performed by: INTERNAL MEDICINE

## 2019-08-07 PROCEDURE — 99214 PR OFFICE/OUTPT VISIT, EST, LEVL IV, 30-39 MIN: ICD-10-PCS | Mod: S$PBB,,, | Performed by: NURSE PRACTITIONER

## 2019-08-07 NOTE — PROGRESS NOTES
Subjective:       Patient ID: Alisson Eldridge is a 48 y.o. female.    Chief Complaint: No chief complaint on file.    Chest pain lower sternum x 4 weeks which is constant but gets better/worse. She has been moving a lot of boxes. She denies association with eating or food. No nausea or vomiting. Pain is sometimes dull and sometimes sharp. She has no shortness of breath. She has not taken any recent long trips or had immobility. No leg swelling. She stopped Estrace in December. She does smoke.  No family history of heart disease.  A couple hours after doing some activity or lifting she will have worse pain.    Review of Systems   Constitutional: Negative for chills and fever.   Eyes: Negative for visual disturbance.   Respiratory: Negative for cough, shortness of breath and wheezing.    Cardiovascular: Positive for chest pain. Negative for palpitations and leg swelling.   Gastrointestinal: Negative for nausea and vomiting.   Musculoskeletal: Positive for back pain (chronic). Negative for gait problem and joint swelling.       Objective:      Physical Exam   Constitutional: She is oriented to person, place, and time. She appears well-nourished. No distress.   HENT:   Head: Normocephalic and atraumatic.   Right Ear: External ear normal.   Left Ear: External ear normal.   Mouth/Throat: Oropharynx is clear and moist. No oropharyngeal exudate.   Eyes: Pupils are equal, round, and reactive to light. Right eye exhibits no discharge. Left eye exhibits no discharge.   Neck: Neck supple. No thyromegaly present.   Cardiovascular: Normal rate and regular rhythm. Exam reveals no gallop and no friction rub.   No murmur heard.  Pulmonary/Chest: Effort normal and breath sounds normal. No respiratory distress. She has no wheezes. She has no rales. She exhibits tenderness.       Abdominal: Soft. Bowel sounds are normal. She exhibits no distension. There is no tenderness.   Lymphadenopathy:     She has no cervical adenopathy.    Neurological: She is alert and oriented to person, place, and time. Coordination normal.   Skin: Skin is warm and dry.   Psychiatric: She has a normal mood and affect. Her behavior is normal. Thought content normal.   Vitals reviewed.          Current Outpatient Medications:     acetaminophen (TYLENOL) 500 MG tablet, Take 1,000 mg by mouth every 6 (six) hours as needed for Pain., Disp: , Rfl:     azelastine (ASTELIN) 137 mcg (0.1 %) nasal spray, U 1 SPR IEN BID, Disp: , Rfl: 12    fexofenadine (ALLEGRA) 180 MG tablet, Take 180 mg by mouth once daily., Disp: , Rfl:     fluticasone (FLONASE) 50 mcg/actuation nasal spray, SHAKE LQ AND U 1 SPR IEN BID, Disp: , Rfl: 12    guaifenesin/pseudoephedrne HCl (MUCINEX D ORAL), Take by mouth., Disp: , Rfl:     ipratropium (ATROVENT) 42 mcg (0.06 %) nasal spray, U 2 SPRAYS IEN QID, Disp: , Rfl: 5    omeprazole (PRILOSEC) 40 MG capsule, Take 40 mg by mouth once daily., Disp: , Rfl:     ranitidine (ZANTAC) 300 MG tablet, Take 1 tablet (300 mg total) by mouth every evening., Disp: 30 tablet, Rfl: 11  Assessment:       1. Chest pain, unspecified type        Plan:       Chest pain, unspecified  Do not suspect PE   Pretest probability for PE is low   History and exam points to musculoskeletal pain however with abnormal EKG will order stress test.  NSAID's (take with food and continue omeprazole).  Apply ice to site.  -     IN OFFICE EKG 12-LEAD (to [place])

## 2019-08-19 RX ORDER — FLUTICASONE PROPIONATE 50 MCG
SPRAY, SUSPENSION (ML) NASAL
Qty: 16 ML | Refills: 0 | Status: SHIPPED | OUTPATIENT
Start: 2019-08-19 | End: 2019-09-04 | Stop reason: SDUPTHER

## 2019-09-04 RX ORDER — FLUTICASONE PROPIONATE 50 MCG
SPRAY, SUSPENSION (ML) NASAL
Qty: 16 ML | Refills: 0 | Status: SHIPPED | OUTPATIENT
Start: 2019-09-04 | End: 2019-10-04 | Stop reason: ALTCHOICE

## 2019-09-18 PROBLEM — D50.9 IRON DEFICIENCY ANEMIA: Status: ACTIVE | Noted: 2019-09-18

## 2019-09-18 PROBLEM — K21.9 LPRD (LARYNGOPHARYNGEAL REFLUX DISEASE): Status: ACTIVE | Noted: 2019-09-18

## 2019-09-18 PROBLEM — N95.8 ARTIFICIAL MENOPAUSE: Status: RESOLVED | Noted: 2018-11-08 | Resolved: 2019-09-18

## 2019-09-18 PROBLEM — J30.9 CHRONIC ALLERGIC RHINITIS: Status: ACTIVE | Noted: 2019-09-18

## 2019-10-03 PROBLEM — Z72.0 TOBACCO ABUSE: Status: ACTIVE | Noted: 2019-10-03

## 2019-10-03 PROBLEM — N89.8 VAGINAL MASS: Status: RESOLVED | Noted: 2019-01-07 | Resolved: 2019-10-03

## 2019-10-04 ENCOUNTER — OFFICE VISIT (OUTPATIENT)
Dept: FAMILY MEDICINE | Facility: CLINIC | Age: 49
End: 2019-10-04
Payer: MEDICARE

## 2019-10-04 VITALS
WEIGHT: 106.69 LBS | HEART RATE: 71 BPM | TEMPERATURE: 98 F | SYSTOLIC BLOOD PRESSURE: 100 MMHG | DIASTOLIC BLOOD PRESSURE: 70 MMHG | HEIGHT: 60 IN | BODY MASS INDEX: 20.94 KG/M2

## 2019-10-04 DIAGNOSIS — M54.50 CHRONIC BILATERAL LOW BACK PAIN WITHOUT SCIATICA: ICD-10-CM

## 2019-10-04 DIAGNOSIS — G89.29 CHRONIC BILATERAL LOW BACK PAIN WITHOUT SCIATICA: ICD-10-CM

## 2019-10-04 DIAGNOSIS — Z12.11 SCREEN FOR COLON CANCER: ICD-10-CM

## 2019-10-04 DIAGNOSIS — D50.0 IRON DEFICIENCY ANEMIA DUE TO CHRONIC BLOOD LOSS: ICD-10-CM

## 2019-10-04 DIAGNOSIS — J30.9 CHRONIC ALLERGIC RHINITIS: ICD-10-CM

## 2019-10-04 DIAGNOSIS — R53.83 LETHARGY: ICD-10-CM

## 2019-10-04 DIAGNOSIS — Z90.710 STATUS POST LAPAROSCOPIC HYSTERECTOMY: ICD-10-CM

## 2019-10-04 DIAGNOSIS — K21.9 LPRD (LARYNGOPHARYNGEAL REFLUX DISEASE): ICD-10-CM

## 2019-10-04 DIAGNOSIS — Z23 NEED FOR TDAP VACCINATION: ICD-10-CM

## 2019-10-04 DIAGNOSIS — Z12.31 SCREENING MAMMOGRAM, ENCOUNTER FOR: ICD-10-CM

## 2019-10-04 DIAGNOSIS — Z72.0 TOBACCO ABUSE: ICD-10-CM

## 2019-10-04 DIAGNOSIS — S81.812A LACERATION OF LEFT LOWER EXTREMITY, INITIAL ENCOUNTER: ICD-10-CM

## 2019-10-04 PROCEDURE — 99214 OFFICE O/P EST MOD 30 MIN: CPT | Mod: S$PBB,,, | Performed by: FAMILY MEDICINE

## 2019-10-04 PROCEDURE — 90471 IMMUNIZATION ADMIN: CPT | Mod: PBBFAC,PO

## 2019-10-04 PROCEDURE — 99214 OFFICE O/P EST MOD 30 MIN: CPT | Mod: PBBFAC,PO | Performed by: FAMILY MEDICINE

## 2019-10-04 PROCEDURE — 99999 PR PBB SHADOW E&M-EST. PATIENT-LVL IV: CPT | Mod: PBBFAC,,, | Performed by: FAMILY MEDICINE

## 2019-10-04 PROCEDURE — 99214 PR OFFICE/OUTPT VISIT, EST, LEVL IV, 30-39 MIN: ICD-10-PCS | Mod: S$PBB,,, | Performed by: FAMILY MEDICINE

## 2019-10-04 PROCEDURE — 99999 PR PBB SHADOW E&M-EST. PATIENT-LVL IV: ICD-10-PCS | Mod: PBBFAC,,, | Performed by: FAMILY MEDICINE
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
## 2019-10-07 ENCOUNTER — OUTPATIENT CASE MANAGEMENT (OUTPATIENT)
Dept: ADMINISTRATIVE | Facility: OTHER | Age: 49
End: 2019-10-07

## 2019-10-07 NOTE — LETTER
October 9, 2019    Alisson Eldridge  [address]  [place] [address]             [facility] [address] [place] [address] Dear MsTung Alisson,     Thank you for taking my call and discussing care management with me. We understand that receiving many services from different doctors and healthcare providers can be overwhelming and difficult to manage. There are appointments to make, transportation to arrange, dietary instructions to understand, and new medications to obtain. This is where I can help. The best thing about this service is there is no charge for this support, either to you or to your insurance company. We can provide this support to you over the phone so as not to conflict with any other appointments you may have scheduled.    Our department consists of Registered Nurses and Social Workers who work closely with the [facility]. Our goal is to connect you with any resources (financial, medical, social, mental health, educational) to help you manage your health condition(s) safely, within your living environment. We want to help you function at the healthiest and highest level possible.    I have also enclosed my contact information and information on Outpatient Care Management (OPCM) services. I am available Monday through Friday from 8:00 AM to 4:30 PM. If you have any questions or concerns, please don't hesitate to call me at [phone]. [facility] also has a program where a nurse is available 24/7 to answer questions or provide medical advice. That number is [phone].    Kind Regards,        Inessa Lam RN  [facility]  [phone]

## 2019-10-07 NOTE — LETTER
October 17, 2019    Alisson Eldridge  [address]  [place] [address]             [facility] [address] Dear MsTung Alisson,     Thank you for taking my call and discussing care management with me. We understand that receiving many services from different doctors and healthcare providers can be overwhelming and difficult to manage. There are appointments to make, transportation to arrange, dietary instructions to understand, and new medications to obtain. This is where I can help. The best thing about this service is there is no charge for this support, either to you or to your insurance company. We can provide this support to you over the phone so as not to conflict with any other appointments you may have scheduled.    Our goal is to help you manage your health condition(s) safely, within your living environment. We want to help you function at the healthiest and highest level possible.    I have enclosed some educational resource information for your review that you may find useful. I will be contacting you periodically for the next 2-3 months to assist you in managing your health care needs. We will review the information that I mailed to you and address any questions you may have at these calls. I look forward to working with you to meet the goals we set to better manage your health care needs.     I have also enclosed my contact information and information on Outpatient Care Management (OPCM) services. I am available Monday through Friday from 8:00 AM to 4:30 PM. If you have any questions or concerns, please don't hesitate to call me at [phone]. [facility] also has a program where a nurse is available 24/7 to answer questions or provide medical advice. That number is [phone].    Kind Regards,        Inessa Lam, RN  [facility]  [phone]

## 2019-10-09 NOTE — PROGRESS NOTES
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 Patient denies any transportation issues; states her S/O, Travon Russo provides transportation for her and both Traovn and patient's grown son are very supportive. 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 States that she also needs an eye exam, but cannot afford to get new glasses, which she needs. Patient is in agreement with a referral to [facility]- for any available assistance with those needs as well as food insecurity and any available assistance with utilities. Patient states that it is difficult to afford food on her income of approximately $700 per month and that the utilities in the home are often difficult to afford. Patient does not have a living will or POA and is interested in [facility], which I will mail to her. Patient reports a recent fall when her legs gave out about 3 weeks ago and she fell to her knees; no significant injuries other than bruised knees. States that she has started having more back problems lately, reporting that she has had scoliosis for a long time, but was recently told that her " "spine was "twisting" from "T-12 down to my pelvic bone". States that her new PCP, Dr. Delon Sung, recently [facility] and she has an appointment with Dr. Long there tomorrow for an evaluation. I will mail educational literature about Fall Prevention  to patient and will with patient at next phone call. Will also review any recommendations from Dr. Long at next phone call to determine if patient needs additional information regarding PT or educational information on back/spine disorders or pain. Patient denies taking any narcotics for pain but reports "frequently" taking two 500mg Extra Strength Tylenols every 6 hours for pain, which totals 4000mg of acetaminophen in a 24 hour period. Explained to patient that Tylenol lowered the maximum daily dosage for single-ingredient Extra Strength Tylenol products sold in the [place] from 8 pills per day (4000mg) to 6 pills per day (3000mg) and that she should discuss that dosage with her healthcare provider to make sure he is aware of the dosage she is taking; verbalized understanding. Patient also states that she has chronic Iron Deficiency Anemia but is unable to take oral iron as it gives her migraines. Encouraged patient to include foods that are high in iron to increase her dietary iron intake which would help. Will mail patient educational literature on Iron Deficiency Anemia and review with patient at next phone call. Will also mail a list of high iron foods and patient has agreed to review the list and introduce some of those foods into her diet. Will send an in-basket message to PCP, Dr. Sung regarding patient's Tylenol dosage, recommendations for possible options for iron (IV infusion?) and request a referral for an eye doctor for an eye exam. In reviewing patient's medical history, she states that "some time ago" she was told that she had an "episode" of CHF, but does not know what that is or what she should be looking out for and " "would like to know more about that. I will mail educational literature about CHF/CHF Flare and  to patient and will review literature with patient at next phone call. Encouraged patient to discuss her history of CHF with current PCP as he may want to refer her to a Cardiologist; verbalized understanding. Educated patient on the importance of smoking cessation. Patient states that she has "quit" a couple of times in the past, but is currently smoking about a pack per day. Patient states that she is willing to review the Smoking Cessation literature and may consider the Smoking Cessation Program. Will continue to encourage patient . Patient expressed appreciation for outreach and is in agreement with a follow up call in the next 10-12 days. Will continue to follow. Perry Lam RN-[facility]    Todays [facility] Self-Management Care Plan was developed with the patients input and was based on identified barriers from todays assessment. Goals were written today with the patient and the patient has agreed to work towards these goals to improve her overall well-being. Patient verbalized understanding of the care plan, goals, and all of today's instructions. Encouraged patient to communicate with her physician and health care team about health conditions and the treatment plan. Provided my contact information today and encouraged patient to call me with any questions as needed.  "

## 2019-10-17 ENCOUNTER — OUTPATIENT CASE MANAGEMENT (OUTPATIENT)
Dept: ADMINISTRATIVE | Facility: OTHER | Age: 49
End: 2019-10-17

## 2019-10-17 NOTE — PATIENT INSTRUCTIONS
Preventing Falls in the Home  An adult or child can fall for many reasons. If you are an older adult, you may fall because your reaction time slows down. Your muscles and joints may get stiff, weak, or less flexible because of illness, medicines, or a physical condition. These things can also make a child more likely to fall or be injured in a fall.  Other health problems that make falls more likely include:  · Arthritis  · Dizziness or lightheadedness when you get out of bed (orthostatic hypotension)  · History of a stroke  · Dizziness  · Anemia  · Certain medicines taken for mental illness  · Problems with balance or gait  · History of falls with or without an injury  · Changes in vision (vision impairment)  · Changes in thinking skills and memory (cognitive impairment)  Injuries from a fall can include broken bones, dislocated joints, and cuts. When these injuries are serious enough, they can make it impossible for you or a child who is injured in a fall to live on his or her own.  Prevention tips  To help prevent falls and fall-related injuries, follow the tips below.   Floors  Make floors safer by doing the following:   · Put nonskid pads under area rugs.  · Remove throw rugs.  · Replace worn floor coverings.  · Tack carpets firmly to each step on carpeted stairs. Put nonskid strips on the edges of uncarpeted stairs.  · Keep floors and stairs free of clutter and cords.  · Arrange furniture so there are clear pathways.  · Clean up any spills right away.  · Wear shoes that fit.  Bathrooms    Make bathrooms safer by doing the following:   · Install grab bars in the tub or shower.  · Apply nonskid strips or put a nonskid rubber mat in the tub or shower.  · Sit on a bath chair to bathe.  · Use bathmats with nonskid backing.  Lighting and the environment  Improve lighting in your home by doing the following:   · Keep a flashlight in each room. Or put a lamp next to the bed within easy reach.  · Put nightlights in  the bedrooms, hallways, kitchen, and bathrooms.  · Make sure all stairways have good lighting.  · Take your time when going up and down stairs.  · Put handrails on both sides of stairs and in walkways for more support. To prevent injury to your wrist or arm, dont use handrails to pull yourself up.  · Install grab bars to pull yourself up.  · Move or rearrange items that you use often. This will make them easier to find or reach.  · Look at your home to find any safety hazards. Especially look at doorways, walkways, and the driveway. Remove or repair any safety problems that you find.  Date Last Reviewed: 8/1/2016 © 2000-2017 [employer]. [address] [place] [address], [place] [address]. All rights reserved. This information is not intended as a substitute for professional medical care. Always follow your healthcare professional's instructions.        Kicking the Smoking Habit  If you smoke, quitting is one of the best changes you can make for your heart and your overall health. Your risk of heart attack goes down within one day of putting out that last cigarette. As you go longer without smoking, your risk goes down even more. Quitting isnt easy, but millions of people have done it. You can, too. Its never too late to quit.  Getting started  Boost your chances of success by deciding on your quit plan. Your health care provider and cardiac rehab team can help you develop this plan. Even if youve already quit, its easy to slip back into smoking.  Your plan can help you avoid and recover from relapse.  In any case, start by setting a date to quit within a month, and do it.    Keys to your quit plan  · Talk to your healthcare provider about prescription medicines and nicotine replacement products that help stop the urge to smoke.   · Join a support group or quit smoking program. Talking with others about the challenges of quitting can help you get through them.  · Ask other smokers in your household to  quit with you.  · Look for the cues in your life that you associate with smoking and avoid them.  Track your triggers  What gives you that O-wptf-w-cigarette feeling? List all the situations that make you want a cigarette. Then think of other ways to deal with these situations. Here are some examples:  Situation How I'll handle it   Finishing a meal Get up from the table and take a walk   Having an argument Find a quiet place and breathe deeply   Feeling lonely or bored Call a friend to talk         Tips for quitting successfully  · List the benefits of quitting such as reducing heart risks and saving money. Keep this list and review it whenever you feel like smoking.  · Get support. Let your friends know you may call them to chat when you have an urge to smoke.  · If youve tried to quit before without success, this time avoid the triggers that may cause the relapse.  · Make the most of slip-ups. Try to learn from them, and then get back on track.  · Be accountable to your friends and your calendar so that you stay on track.  For family and friends  · Be supportive and patient. Quitting smoking can be difficult and stressful.  · If you smoke, nows a great time to quit. Even if you dont quit, never smoke around your loved one. Secondhand smoke is dangerous to his or her heart.  · The best goals are accomplished in teams. Remember that when your loved one states he or she wants to stop smoking.  Date Last Reviewed: 7/1/2016  © 9293-8295 [employer]. [address] [place] [address], [place] [address]. All rights reserved. This information is not intended as a substitute for professional medical care. Always follow your healthcare professional's instructions.        Staying Smoke-Free     Deep breathing can help ease the urge to smoke.     Quitting smoking is a big change. People will congratulate you. You have the right to be proud. But later at times you may miss smoking. Plan ahead to resist  temptation.  Prepare to be tempted  · If you feel the urge to smoke, distract yourself for about 5 minutes. Drink water. Call a friend, walk around the room, or try deep breathing. Usually, the urge to smoke will pass.  · Dont trust yourself to have just one cigarette. Many ex-smokers get hooked again that way.  · Remind yourself why you quit. Tell yourself you can stay quit.  · Avoid people or places that can trigger you to smoke. Ask others not to smoke in your home or car.  · Spend time in places where you cant smoke-- a museum, a library, a store, or a gym.  · Take your nonsmoking life one day at a time. Edwin each day on your calendar.  · HALT your desire. Keep yourself from feeling too Hungry, Angry, Lonely, or Tired. Deal with your real needs. Eat, talk, or sleep.  · Put aside cigarette money and reward yourself.  If you slip  You may slip and smoke again. Many ex-smokers slip on the way to success. If you do, its not the end of your quit process. Think about what triggered you to smoke. Then think of ways to prevent future slips. Ask yourself what you can learn from the slip. Decide how you will handle this trigger better in the future. Then get back on track--right away!  Dont give up  Keep telling yourself youre no longer a smoker. Dont lose hope. Most people have tried to quit several times before being successful. Try to stay focused on your plan to be smoke-free. Keep in mind all the benefits of staying quit. Millions of people have given up smoking. You can too.        For more information  · https://smokefree.gov/shcj-fu-vi-expert  · National Cancer [place] Smoking Quitline: 877-44U-QUIT (931-742-3548)      Date Last Reviewed: 2/1/2017  © 6541-8795 [employer]. [address] [place] [address], [place] [address]. All rights reserved. This information is not intended as a substitute for professional medical care. Always follow your healthcare professional's instructions.        Coping with  Smoking Withdrawal  For the first few days after you quit smoking, you may feel cranky, restless, depressed, or low on energy. These are symptoms of withdrawal. Your body needs time to recover from smoking. Your symptoms should lessen within a few days.    Coping with the urge to smoke  · Deep-breathe. Inhale through your nose. Count to 5. Slowly exhale through your mouth.  · Drink water. Try to drink 8 or more 8-ounce glasses of water a day.  · Keep your hands busy. Wash your car. Draw. Do a puzzle. Build a Little Pim.  · Delay. The urge to smoke lasts only 3 to 5 minutes.  Get support  Individual, group, and telephone counseling can help keep you on track. Ask your healthcare provider for more information about resources available to you.  Control stress  After you quit, you may feel irritable and stressed. Try taking a warm bath or shower. Listen to music. Go for a walk or to the gym. Try yoga or meditate. Call friends or talk with a professional.  Exercise  Exercise helps your body and mind feel better. There are many ways to be more active. Find something you enjoy doing. See if a friend will join you for a walk or a bike ride.  Sleep better  You may feel tired but have trouble falling asleep. Try to relax before bed. Do a few stretching exercises. Read for a while. Also, avoid caffeine for at least a few hours before bedtime.  Get fit, not fat  You may notice an increased appetite. Many people who quit smoking gain a few pounds. To limit weight gain, try to watch what you eat. Cut back on fat in your diet. Snack on low-calorie foods, like fresh fruits and vegetables. Drink low-calorie liquids, especially water. Regular exercise can also help you stay fit. And remember: Your main goal is to be a nonsmoker. Stay focused on that goal.  Quit-smoking products  There are a number of products that can help you quit smoking including medicines and nicotine replacement products. They are available over-the-counter or by  prescription. Ask your healthcare provider if any of these could help you quit smoking.        For more information  · https://smokefree.gov/ucmr-lp-qy-expert  · National Cancer [place] Smoking Quitline: 877-44U-QUIT (656-819-7564)   Date Last Reviewed: 2/1/2017  © 4114-6125 [employer]. [address] [place] [address], [place] [address]. All rights reserved. This information is not intended as a substitute for professional medical care. Always follow your healthcare professional's instructions.        How to Quit Smoking  Smoking is one of the hardest habits to break. About half of all people who have ever smoked have been able to quit. Most people who still smoke want to quit. Here are some of the best ways to stop smoking.    Keep trying  Most smokers make many attempts at quitting before they are successful. Its important not to give up.  Go cold turkey  Most former smokers quit cold turkey (all at once). Trying to cut back gradually doesn't seem to work as well, perhaps because it continues the smoking habit. Also, it is possible to inhale more while smoking fewer cigarettes. This results in the same amount of nicotine in your body.  Get support  Support programs can be a big help, especially for heavy smokers. These groups offer lectures, ways to change behavior, and peer support. Here are some ways to find a support program:  · Free national quitline: 800-QUIT-NOW (911-476-7908).  · Hospital quit-smoking programs.  · American Lung Association: (378.112.4211).  · American Cancer Society (196-154-2287).  Support at home is important too. Nonsmokers can offer praise and encouragement. If the smoker in your life finds it hard to quit, encourage them to keep trying.  Over-the-counter medicines  Nicotine replacement therapy may make quitting easier. Certain aids, such as the nicotine patch, gum, and lozenges, are available without a prescription. It is best to use these under a doctors care, though. The  "skin patch provides a steady supply of nicotine. Nicotine gum and lozenges give temporary bursts of low levels of nicotine. Both methods reduce the craving for cigarettes. Warning: If you have nausea, vomiting, dizziness, weakness, or a fast heartbeat, stop using these products and see your doctor.  Prescription medicines  After reviewing your smoking patterns and past attempts to quit, your doctor may offer a prescription medicine such as bupropion, varenicline, a nicotine inhaler, or nasal spray. Each has advantages and side effects. Your doctor can review these with you.  Health benefits of quitting  The benefits of quitting start right away and keep improving the longer you go without smoking. These benefits occur at any age.  So whether you are 17 or 70, quitting is a good decision. Some of the benefits include:  · 20 minutes: Blood pressure and pulse return to normal.  · 8 hours: Oxygen levels return to normal.  · 2 days: Ability to smell and taste begin to improve as damaged nerves regrow.  · 2 to 3 weeks: Circulation and lung function improve.  · 1 to 9 months: Coughing, congestion, and shortness of breath decrease; tiredness decreases.  · 1 year: Risk of heart attack decreases by half.  · 5 years: Risk of lung cancer decreases by half; risk of stroke becomes the same as a nonsmokers.  For more on how to quit smoking, try these online resources:   · Smokefree.gov  · "Clearing the Air" booklet from the National Cancer [place]: smokefree.gov/sites/default/files/pdf/clearing-the-air-accessible.pdf  Date Last Reviewed: 3/1/2017  © 7269-8833 [employer]. [address] [place] [address], [place] [address]. All rights reserved. This information is not intended as a substitute for professional medical care. Always follow your healthcare professional's instructions.        Left- or Right- Side Congestive Heart Failure (CHF)    The heart is a large muscle. It is a pump that circulates blood throughout the " body. Blood carries oxygen to all of the organs, including the brain, muscles, and skin. After your body takes the oxygen out of the blood, the blood returns to the heart. The right side of the heart collects the blood from the body and pumps it to the lungs. In the lungs, it gets fresh oxygen and gives up carbon dioxide. The oxygen-rich blood from the lungs then returns to the left side of the heart, where it is pumped back out to the rest of your body, starting the process all over.  Congestive heart failure (CHF) occurs when the heart muscle is weakened. This affects the pumping action of the heart. Heart failure can affect the right side of the heart or the left side. But heart failure may affect not only the right side of the heart or only the left side. Although it may have started on one side, it can and often eventually does affect both sides.  Right-side heart failure  When the right side of the heart is weakened, it cant handle the blood it is getting from the rest of the body. This blood returns to the heart through veins. When too much pressure builds up in the veins, fluid leaks out into the tissues. Gravity then causes that fluid to move to those parts of the body that are the lowest. So one of the first symptoms of right-side CHF can include swelling in the feet and ankles. If the condition gets worse, the swelling can even go up past the knees. Sometimes it gets so severe, the liver can get congested as well.  Left-side heart failure  When the left side of the heart is weakened, it cant handle the blood it gets from the lungs. Pressure then builds up in the veins of the lungs, causing fluid to leak into the lung tissues. This may cause CHF and pulmonary edema. This causes you to feel short of breath, weak, or dizzy. These symptoms are often worse with exertion, such as when climbing stairs or walking up hills. Lying with your head flat is uncomfortable and can make your breathing worse. This may  make sleeping difficult. You may need to use extra pillows to elevate your upper body to sleep well. The same is true when just resting during the daytime.  There are many causes of heart failure including:  · Coronary artery disease  · Past heart attack (also known as acute myocardial infarction, or AMI)  · High blood pressure  · Damaged heart valve  · Diabetes  · Obesity  · Cigarette smoking  · Alcohol abuse  Heart failure is a chronic condition. There is no cure. The purpose of medical treatment is to improve the pumping action of the heart. The main way to do this is to remove excess water from the body. A number of medicines can help reach this goal, improve symptoms, and prevent the heart from becoming weaker. Sometimes, heart failure can become so severe that a device is placed in the heart to help with pumping. Another major goal is to better treat the causes of heart failure, such as diabetes and high blood pressure, by making changes in your lifestyle and maximizing medical control when needed.  Home care  Follow these guidelines when caring for yourself at home:  · Check your weight every day. This is very important because a sudden increase in weight gain could mean worsening heart failure. Keep these things in mind:  ¨ Use the same scale every day.  ¨ Weigh yourself at the same time every day.  ¨ Make sure the scale is on a hard floor surface, not on a rug or carpet.  ¨ Keep a record of your weight every day so your healthcare provider can see it. If you are not given a log sheet for this, keep a separate journal for this purpose.   · Cut back on the amount of salt (sodium) you eat. Follow your healthcare provider's recommendation on how much salt or sodium you should have each day.  ¨ Avoid high-salt foods. These include olives, pickles, smoked meats, salted potato chips, and most prepared foods.  ¨ Don't add salt to your food at the table. Use only small amounts of salt when cooking.  ¨ Read the  labels carefully on food packages to learn how much salt or sodium is in each serving in the package. Remember, a can or package of food may contain more than 1 serving. So if you eat all the food in the package, you may be getting more salt than you think.  · Follow your healthcare provider's recommendations about how much fluid you should have. Be aware that some foods, such as soup, pudding, and juicy fruits like oranges or melons, contain liquid. You'll need to count the liquid in those foods as part of your daily fluid intake. Your provider can help you with this.  · Stop smoking.  · Cut back on how much alcohol you drink.  · Lose weight if you are overweight. The excess weight adds a lot of stress on the workload of the heart.  · Stay active. Talk with your provider about an exercise program that is safe for your heart.  · Keep your feet elevated to reduce swelling. Ask your provider about support hose as a preventive treatment for daytime leg swelling.  Besides taking your medicine as instructed, an important part of treatment is lifestyle changes. These include diet, physical activity, stopping smoking, and weight control.  Improve your diet by including more fresh foods, cutting back on how much sugar and saturated fat you eat, and eating fewer processed foods and less salt.  Follow-up care  Follow up with your healthcare provider, or as advised.  Make sure to keep any appointments that were made for you. These can help better control your congestive heart failure. You will need to follow up with your provider on a routine basis to make sure your heart failure is well managed.  If an X-ray, ECG, or other tests were done, you will be told of any new findings that may affect your care.  Call 911  Call 911 if you:  · Become severely short of breath  · Feel lightheaded, or feel like you might pass out or faint  · Have chest pain or discomfort that is different than usual, the medicines your doctor told you to  use for this don't help, or the pain lasts longer than 10 to 15 minutes  · You suddenly develop a rapid heart rate  When to seek medical advice  The following may be signs that your heart failure is getting worse. Call your healthcare provider right away if any of these happen:  · Sudden weight gain. This means 3 or more pounds in one day, or 5 or more pounds in 1 week.  · Trouble breathing not related to being active  · New or increased swelling of your legs or ankles  · Swelling or pain in your abdomen  · Breathing trouble at night. This means waking up short of breath or needing more pillows to breathe.  · Frequent coughing that doesnt go away  · Feeling much more tired than usual  Date Last Reviewed: 1/4/2016  © 5032-0682 [employer]. [address] [place] [address], [place] [address]. All rights reserved. This information is not intended as a substitute for professional medical care. Always follow your healthcare professional's instructions.        Heart Failure: Warning Signs of a Flare-Up  You have a condition called heart failure. Once you have heart failure, flare-ups can happen. Below are signs that can mean your heart failure is getting worse. If you notice any of these warning signs, call your healthcare provider.  Swelling    · Your feet, ankles, or lower legs get puffier.  · You notice skin changes on your lower legs.  · Your shoes feel too tight.  · Your clothes are tighter in the waist.  · You have trouble getting rings on or off your fingers.  Shortness of breath  · You have to breathe harder even when youre doing your normal activities or when youre resting.  · You are short of breath walking up stairs or even short distances.  · You wake up at night short of breath or coughing.  · You need to use more pillows or sit up to sleep.  · You wake up tired or restless.  Other warning signs  · You feel weaker, dizzy, or more tired.  · You have chest pain or changes in your heartbeat.  · You have a  cough that wont go away.  · You cant remember things or dont feel like eating.  Tracking your weight  Gaining weight is often the first warning sign that heart failure is getting worse. Gaining even a few pounds can be a sign that your body is retaining excess water and salt. Weighing yourself each day in the morning after you urinate and before you eat, is the best way to know if you're retaining water. Get a scale that is easy to read and make sure you wear the same clothes and use the same scale every time you weigh. Your healthcare provider will show you how to track your weight. Call your doctor if you gain more than 2 pounds in 1 day, 5 pounds in 1 week, or whatever weight gain you were told to report by your doctor. This is often a sign of worsening heart failure and needs to be evaluated and treated before it compromises your breathing. Your doctor will tell you what to do next.    Date Last Reviewed: 3/15/2016  © 2116-6316 [employer]. [address] [place] [address], [place] [address]. All rights reserved. This information is not intended as a substitute for professional medical care. Always follow your healthcare professional's instructions.        Heart Failure: Tracking Your Weight  You have a condition called heart failure. When you have heart failure, a sudden weight gain or a steady rise in weight is a warning sign that your body is retaining too much water and salt. This could mean your heart failure is getting worse. If left untreated, it can cause problems for your lungs and result in shortness of breath. Weighing yourself each day is the best way to know if youre retaining water. If your weight goes up quickly, call your doctor. You will be given instructions on how to get rid of the excess water. You will likely need medicines and to avoid salt. This will help your heart work better.  Call your doctor if you gain more than 2 pounds in 1 day, more than 5 pounds in 1 week, or whatever  weight gain you were told to report by your doctor. This is often a sign of worsening heart failure and needs to be evaluated and treated. Your doctor will tell you what to do next.   Tips for weighing yourself    · Weigh yourself at the same time each morning, wearing the same clothes. Weigh yourself after urinating and before eating.  · Use the same scale each day. Make sure the numbers are easy to read. Put the scale on a flat, hard surface -- not on a rug or carpet.  · Do not stop weighing yourself. If you forget one day, weigh again the next morning.  How to use your weight chart  · Keep your weight chart near the scale. Write your weight on the chart as soon as you get off the scale.  · Fill in the month and the start date on the chart. Then write down your weight each day. Your chart will look like this:    · If you miss a day, leave the space blank. Weigh yourself the next day and write your weight in the next space.  · Take your weight chart with you when you go to see your doctor.  Date Last Reviewed: 3/20/2016  © 3702-2507 [employer]. [address] [place] [address], [place] [address]. All rights reserved. This information is not intended as a substitute for professional medical care. Always follow your healthcare professional's instructions.        Heart Failure: Tracking Your Weight  You have a condition called heart failure. When you have heart failure, a sudden weight gain or a steady rise in weight is a warning sign that your body is retaining too much water and salt. This could mean your heart failure is getting worse. If left untreated, it can cause problems for your lungs and result in shortness of breath. Weighing yourself each day is the best way to know if youre retaining water. If your weight goes up quickly, call your doctor. You will be given instructions on how to get rid of the excess water. You will likely need medicines and to avoid salt. This will help your heart work  better.  Call your doctor if you gain more than 2 pounds in 1 day, more than 5 pounds in 1 week, or whatever weight gain you were told to report by your doctor. This is often a sign of worsening heart failure and needs to be evaluated and treated. Your doctor will tell you what to do next.   Tips for weighing yourself    · Weigh yourself at the same time each morning, wearing the same clothes. Weigh yourself after urinating and before eating.  · Use the same scale each day. Make sure the numbers are easy to read. Put the scale on a flat, hard surface -- not on a rug or carpet.  · Do not stop weighing yourself. If you forget one day, weigh again the next morning.  How to use your weight chart  · Keep your weight chart near the scale. Write your weight on the chart as soon as you get off the scale.  · Fill in the month and the start date on the chart. Then write down your weight each day. Your chart will look like this:    · If you miss a day, leave the space blank. Weigh yourself the next day and write your weight in the next space.  · Take your weight chart with you when you go to see your doctor.  Date Last Reviewed: 3/20/2016  © 0526-0840 [employer]. [address] [place] [address], [place] [address]. All rights reserved. This information is not intended as a substitute for professional medical care. Always follow your healthcare professional's instructions.        Back Pain (Acute or Chronic)    Back pain is one of the most common problems. The good news is that most people feel better in 1 to 2 weeks, and most of the rest in 1 to 2 months. Most people can remain active.  People experience and describe pain differently; not everyone is the same.  · The pain can be sharp, stabbing, shooting, aching, cramping or burning.  · Movement, standing, bending, lifting, sitting, or walking may worsen pain.  · It can be localized to one spot or area, or it can be more generalized.  · It can spread or radiate upwards,  to the front, or go down your arms or legs (sciatica).  · It can cause muscle spasm.  Most of the time, mechanical problems with the muscles or spine cause the pain. Mechanical problems are usually caused by an injury to the muscles or ligaments. While illness can cause back pain, it is usually not caused by a serious illness. Mechanical problems include:   · Physical activity such as sports, exercise, work, or normal activity  · Overexertion, lifting, pushing, pulling incorrectly or too aggressively  · Sudden twisting, bending, or stretching from an accident, or accidental movement  · Poor posture  · Stretching or moving wrong, without noticing pain at the time  · Poor coordination, lack of regular exercise (check with your doctor about this)  · Spinal disc disease or arthritis  · Stress  Pain can also be related to pregnancy, or illness like appendicitis, bladder or kidney infections, pelvic infections, and many other things.  Acute back pain usually gets better in 1 to 2 weeks. Back pain related to disk disease, arthritis in the spinal joints or spinal stenosis (narrowing of the spinal canal) can become chronic and last for months or years.  Unless you had a physical injury (for example, a car accident or fall) X-rays are usually not needed for the initial evaluation of back pain. If pain continues and does not respond to medical treatment, X-rays and other tests may be needed.  Home care  Try these home care recommendations:  · When in bed, try to find a position of comfort. A firm mattress is best. Try lying flat on your back with pillows under your knees. You can also try lying on your side with your knees bent up towards your chest and a pillow between your knees.  · At first, do not try to stretch out the sore spots. If there is a strain, it is not like the good soreness you get after exercising without an injury. In this case, stretching may make it worse.  · Avoid prolong sitting, long car rides, or  travel. This puts more stress on the lower back than standing or walking.  · During the first 24 to 72 hours after an acute injury or flare up of chronic back pain, apply an ice pack to the painful area for 20 minutes and then remove it for 20 minutes. Do this over a period of 60 to 90 minutes or several times a day. This will reduce swelling and pain. Wrap the ice pack in a thin towel or plastic to protect your skin.  · You can start with ice, then switch to heat. Heat (hot shower, hot bath, or heating pad) reduces pain and works well for muscle spasms. Heat can be applied to the painful area for 20 minutes then remove it for 20 minutes. Do this over a period of 60 to 90 minutes or several times a day. Do not sleep on a heating pad. It can lead to skin burns or tissue damage.  · You can alternate ice and heat therapy. Talk with your doctor about the best treatment for your back pain.  · Therapeutic massage can help relax the back muscles without stretching them.  · Be aware of safe lifting methods and do not lift anything without stretching first.  Medicines  Talk to your doctor before using medicine, especially if you have other medical problems or are taking other medicines.  · You may use over-the-counter medicine as directed on the bottle to control pain, unless another pain medicine was prescribed. If you have chronic conditions like diabetes, liver or kidney disease, stomach ulcers, or gastrointestinal bleeding, or are taking blood thinners, talk to your doctor before taking any medicine.  · Be careful if you are given a prescription medicines, narcotics, or medicine for muscle spasms. They can cause drowsiness, affect your coordination, reflexes, and judgement. Do not drive or operate heavy machinery.  Follow-up care  Follow up with your healthcare provider, or as advised.   A radiologist will review any X-rays that were taken. Your provide will notify you of any new findings that may affect your care.  Call  911  Call emergency services if any of the following occur:  · Trouble breathing  · Confusion  · Very drowsy or trouble awakening  · Fainting or loss of consciousness  · Rapid or very slow heart rate  · Loss of bowel or bladder control  When to seek medical advice  Call your healthcare provider right away if any of these occur:   · Pain becomes worse or spreads to your legs  · Weakness or numbness in one or both legs  · Numbness in the groin or genital area  Date Last Reviewed: 7/1/2016 © 2000-2017 [employer]. [address] [place] [address], [place] [address]. All rights reserved. This information is not intended as a substitute for professional medical care. Always follow your healthcare professional's instructions.        Iron-Deficiency Anemia (Adult)  Red blood cells carry oxygen to the tissues of your body. Anemia is a condition in which you have too few red blood cells. You need iron to make red cells. Anemia makes you feel tired and run down. When anemia becomes severe, your skin becomes pale. You may feel short of breath after physical activity. Other symptoms include:  · Headaches  · Dizziness  · Leg cramps with physical activity  · Drowsiness  · Restless legs  Your anemia is caused by not having enough iron in your body. This may be because of:  · Loss of blood. This can be caused by heavy menstrual periods. It can also be caused by bleeding from the stomach or intestines.  · Poor diet. You may not be eating enough foods that contain iron.  · Inability to absorb iron from the foods you eat  · Pregnancy  If your blood count is low enough, your healthcare provider may prescribe an iron supplement. It usually takes about 2 to 3 months of treatment with iron supplements to correct anemia. Severe cases of anemia need a blood transfusion to quickly ease symptoms and deliver more oxygen to the cells.  Home care  Follow these guidelines when caring for yourself at home:  · Eat foods high in iron. This will  boost the amount of iron stored in your body. It is a natural way to build up the number of blood cells. Good sources of iron include beef, liver, spinach and other dark green leafy vegetables, whole grains, beans, and nuts.  · Do not overexert yourself.  · Talk with your healthcare provider before traveling by air or traveling to high altitudes.  Follow-up care  Follow up with your healthcare provider in 2 months, or as advised. This is to have another red blood cell count to be sure your anemia has been fixed.  When to seek medical advice  Call your healthcare provider right away if any of these occur:  · Shortness of breath or chest pain  · Dizziness or fainting  · Vomiting blood or passing red or black-colored stool   Date Last Reviewed: 2/25/2016  © 2078-9058 [employer]. [address] [place] [address], [place] [address]. All rights reserved. This information is not intended as a substitute for professional medical care. Always follow your healthcare professional's instructions.

## 2019-10-18 ENCOUNTER — OFFICE VISIT (OUTPATIENT)
Dept: SPINE | Facility: CLINIC | Age: 49
End: 2019-10-18
Payer: MEDICARE

## 2019-10-18 ENCOUNTER — TELEPHONE (OUTPATIENT)
Dept: PAIN MEDICINE | Facility: CLINIC | Age: 49
End: 2019-10-18

## 2019-10-18 ENCOUNTER — HOSPITAL ENCOUNTER (OUTPATIENT)
Dept: RADIOLOGY | Facility: HOSPITAL | Age: 49
Discharge: HOME | End: 2019-10-18
Attending: FAMILY MEDICINE
Payer: MEDICARE

## 2019-10-18 VITALS — WEIGHT: 106 LBS | HEIGHT: 60 IN | BODY MASS INDEX: 20.81 KG/M2

## 2019-10-18 DIAGNOSIS — G89.29 CHRONIC BILATERAL LOW BACK PAIN WITHOUT SCIATICA: Primary | ICD-10-CM

## 2019-10-18 DIAGNOSIS — M54.12 CERVICAL RADICULITIS: ICD-10-CM

## 2019-10-18 DIAGNOSIS — Z12.31 SCREENING MAMMOGRAM, ENCOUNTER FOR: ICD-10-CM

## 2019-10-18 DIAGNOSIS — M54.50 CHRONIC BILATERAL LOW BACK PAIN WITHOUT SCIATICA: Primary | ICD-10-CM

## 2019-10-18 PROCEDURE — 77063 BREAST TOMOSYNTHESIS BI: CPT | Mod: 26,,, | Performed by: RADIOLOGY

## 2019-10-18 PROCEDURE — 99213 PR OFFICE/OUTPT VISIT, EST, LEVL III, 20-29 MIN: ICD-10-PCS | Mod: S$GLB,,, | Performed by: PHYSICAL MEDICINE & REHABILITATION

## 2019-10-18 PROCEDURE — 99213 OFFICE O/P EST LOW 20 MIN: CPT | Mod: S$GLB,,, | Performed by: PHYSICAL MEDICINE & REHABILITATION

## 2019-10-18 PROCEDURE — 77067 SCR MAMMO BI INCL CAD: CPT | Mod: 26,,, | Performed by: RADIOLOGY

## 2019-10-18 PROCEDURE — 77067 SCR MAMMO BI INCL CAD: CPT | Mod: TC

## 2019-10-18 PROCEDURE — 77063 MAMMO DIGITAL SCREENING BILAT WITH TOMOSYNTHESIS_CAD: ICD-10-PCS | Mod: 26,,, | Performed by: RADIOLOGY

## 2019-10-18 PROCEDURE — 77067 MAMMO DIGITAL SCREENING BILAT WITH TOMOSYNTHESIS_CAD: ICD-10-PCS | Mod: 26,,, | Performed by: RADIOLOGY

## 2019-10-18 NOTE — PROGRESS NOTES
SUBJECTIVE:    Patient ID: Alisson Eldridge is a 49 y.o. female.    Chief Complaint: Low-back Pain (for the past 3 weeks shes been having constant pains //pain are dull/sharp pains that even take her breath away//have artheritis in Bilateral hips // dropping things more than usual from vertibrate from back//have done PT before and did not Help//tylenol/aleve does not help with the pain at all )    She is here with persisting complaints of centralized low back pain primarily at the thoracolumbar junction but also the lumbosacral junction.  I saw her in June of last year and had recommended consultation with Neurosurgery.  She says she never got a call from the neurosurgeon's office.  She says she would still be interested in neurosurgical evaluation but says that she would like to try a rhizotomy 1st.  She also has a new complaint of posterior neck discomfort an occasional numbness and tingling radiating down both arms to digits 3 through 5 of both hands.  She says that she has been dropping things.  She says her low back pain has been worse for the past 3 weeks.  Again denies bowel or bladder dysfunction fever chills sweats or unexpected weight loss.        Past Medical History:   Diagnosis Date    Abnormal uterine and vaginal bleeding, unspecified 10/29/2018    Allergy     Arthritis     Asthma     Cancer     cervical    CHF (congestive heart failure)     one episode, no follow up    Chronic back pain     Depression     GERD (gastroesophageal reflux disease)     History of psychiatric care     History of psychiatric hospitalization     Menorrhagia with regular cycle 10/11/2018    Migraine     Primary dysmenorrhea 10/11/2018    Psychiatric exam requested by authority     Psychiatric problem     Scoliosis     Therapy      Social History     Socioeconomic History    Marital status: Legally      Spouse name: Not on file    Number of children: Not on file    Years of education: Not on  file    Highest education level: Not on file   Occupational History    Not on file   Social Needs    Financial resource strain: Not on file    Food insecurity:     Worry: Not on file     Inability: Not on file    Transportation needs:     Medical: Not on file     Non-medical: Not on file   Tobacco Use    Smoking status: Current Every Day Smoker     Packs/day: 1.00     Types: Cigarettes    Smokeless tobacco: Never Used    Tobacco comment: quit cold turkey twicce for 5 years with no difficulty.   Substance and Sexual Activity    Alcohol use: No    Drug use: No    Sexual activity: Yes     Partners: Male     Birth control/protection: None   Lifestyle    Physical activity:     Days per week: Not on file     Minutes per session: Not on file    Stress: Not on file   Relationships    Social connections:     Talks on phone: Not on file     Gets together: Not on file     Attends Che[employer] service: Not on file     Active member of club or organization: Not on file     Attends meetings of clubs or organizations: Not on file     Relationship status: Not on file   Other Topics Concern    Patient feels they ought to cut down on drinking/drug use No    Patient annoyed by others criticizing their drinking/drug use Yes    Patient has felt bad or guilty about drinking/drug use No    Patient has had a drink/used drugs as an eye opener in the AM No   Social History Narrative    Not on file     Past Surgical History:   Procedure Laterality Date    APPENDECTOMY      COLPOSCOPY      LAPAROSCOPIC SALPINGO-OOPHORECTOMY  10/29/2018    Procedure: SALPINGO-OOPHORECTOMY, LAPAROSCOPIC;  Surgeon: Matthew Rene MD;  Location: [facility] OR;  Service: OB/GYN;;    LAPAROSCOPIC TOTAL HYSTERECTOMY N/A 10/29/2018    Procedure: HYSTERECTOMY, TOTAL, LAPAROSCOPIC;  Surgeon: Matthew Rene MD;  Location: [facility] OR;  Service: OB/GYN;  Laterality: N/A;    MULTIPLE TOOTH EXTRACTIONS       Family History   Adopted: Yes   Problem Relation  Age of Onset    Breast cancer Mother     Breast cancer Maternal Grandmother      Vitals:    10/18/19 1058   Weight: 48.1 kg (106 lb)   Height: 5' (1.524 m)       Review of Systems   Constitutional: Negative for chills, diaphoresis, fatigue, fever and unexpected weight change.   HENT: Negative for trouble swallowing.    Eyes: Negative for visual disturbance.   Respiratory: Negative for shortness of breath.    Cardiovascular: Negative for chest pain.   Gastrointestinal: Negative for abdominal pain, constipation, nausea and vomiting.   Genitourinary: Negative for difficulty urinating.   Musculoskeletal: Negative for arthralgias, back pain, gait problem, joint swelling, myalgias, neck pain and neck stiffness.   Neurological: Negative for dizziness, speech difficulty, weakness, light-headedness, numbness and headaches.          Objective:      Physical Exam   Constitutional: She is oriented to person, place, and time. She appears well-developed and well-nourished.   Neurological: She is alert and oriented to person, place, and time.   She is awake and in no acute distress  Reflexes- +1-+2 reflexes at the following:   C5-Biceps   C6-Brachioradialis   C7-Triceps   L3/4-Patellar   S1-Achilles  Ruth sign is negative bilaterally  Strength testing- 5/5 strength in the following muscle groups:  C5-Elbow flexion  C6-Wrist extension  C7-Elbow extension  C8-Finger flexion  T1-Finger abduction  L2-Hip flexion  L3-Knee extension  L4-Ankle dorsiflexion  L5-Great toe extension  S1-Ankle plantar flexion               Assessment:       1. Chronic bilateral low back pain without sciatica    2. Cervical radiculitis           Plan:     she has a nonfocal examination from a neurological standpoint. She has some symptoms of cervical radiculitis with no evidence of nerve root dysfunction.  I am going to get an MRI of the the cervical spine to further evaluate her complaint of upper extremity weakness and dropping things.  We will get  her scheduled for medial branch blocks and subsequent radiofrequency ablation as indicated of the L1-2 and L2-3 facet joints.  Follow-up with me after the MRI.  Consider EMG and nerve conduction studies      Chronic bilateral low back pain without sciatica    Cervical radiculitis  -     MRI Cervical Spine Without Contrast; Future; Expected date: 10/18/2019

## 2019-10-18 NOTE — TELEPHONE ENCOUNTER
----- Message from Theron Long MD sent at 10/18/2019 11:32 AM CDT -----  Please schedule for medial branch blocks and radiofrequency ablation as indicated of the L1 to and L2-3 facet joints on each side

## 2019-10-18 NOTE — LETTER
October 18, 2019      Delon Sung, DO  [address] [facility]  [place] [address]           [facility]  [address] [place] [address] [address]  [place] [address]  Phone: [phone]  Fax: [phone]          Patient: Alisson Eldridge   MR Number: [id number]   YOB: 1970   Date of Visit: 10/18/2019       Dear Dr. Delon Sung:    Thank you for referring Alisson Eldridge to me for evaluation. Attached you will find relevant portions of my assessment and plan of care.    If you have questions, please do not hesitate to call me. I look forward to following Alisson Eldridge along with you.    Sincerely,    Theron Long MD    Enclosure  CC:  No Recipients    If you would like to receive this communication electronically, please contact [email or web address] or [phone] to request more information on [employer] access.    For providers and/or their staff who would like to refer a patient to [facility], please contact us through our one-stop-shop provider referral line, [facility], at [phone].    If you feel you have received this communication in error or would no longer like to receive these types of communications, please e-mail [email or web address]

## 2019-10-21 ENCOUNTER — HOSPITAL ENCOUNTER (OUTPATIENT)
Dept: RADIOLOGY | Facility: HOSPITAL | Age: 49
Discharge: HOME | End: 2019-10-21
Attending: PHYSICAL MEDICINE & REHABILITATION
Payer: MEDICARE

## 2019-10-21 DIAGNOSIS — M54.12 CERVICAL RADICULITIS: ICD-10-CM

## 2019-10-21 PROCEDURE — 72141 MRI NECK SPINE W/O DYE: CPT | Mod: TC

## 2019-10-21 PROCEDURE — 72141 MRI NECK SPINE W/O DYE: CPT | Mod: 26,,, | Performed by: RADIOLOGY

## 2019-10-21 PROCEDURE — 72141 MRI CERVICAL SPINE WITHOUT CONTRAST: ICD-10-PCS | Mod: 26,,, | Performed by: RADIOLOGY

## 2019-10-22 ENCOUNTER — TELEPHONE (OUTPATIENT)
Dept: FAMILY MEDICINE | Facility: CLINIC | Age: 49
End: 2019-10-22

## 2019-10-22 DIAGNOSIS — M43.06 SPONDYLOLYSIS, LUMBAR REGION: Primary | ICD-10-CM

## 2019-10-22 NOTE — TELEPHONE ENCOUNTER
----- Message from Michael Mansfield RN sent at 10/18/2019  1:53 PM CDT -----  Contact: Inessa Lam RN-KAYLYN  Good afternoon. This is Inessa Lam RN with [facility]'s Outpatient Care Management Team. I received a referral on the above patient. I spoke with the patient yesterday on the telephone and wanted to run a few things past you for your recommendations:    1) Patient reports that she routinely takes two 500mg Tylenol tablets daily every 6 hours for pain (4000mg per day). I explained to patient that Tylenol lowered the maximum daily dosage for single-ingredient Extra Strength Tylenol products sold in the [place] from 8 pills per day (4000mg) to 6 pills per day (3000mg) and that she should discuss that dosage with her healthcare provider to make sure you were aware of the daily dosage she is taking.     2) Patient has not had an eye exam in 10 years. She is requesting a referral to an [facility] Optometrist for an eye exam.    3) Patient states that she cannot take oral iron for her Iron Deficiency Anemia. I am providing her with a list of foods that are high in iron, but also wondering if one of the IV irons may benefit her. She reports daily feelings of fatigue.     Please advise.   Please notify patient of your recommendations.    Kindest Regards,  Inessa Lam RN-GARY  [phone] or [phone]

## 2019-10-23 ENCOUNTER — TELEPHONE (OUTPATIENT)
Dept: PAIN MEDICINE | Facility: CLINIC | Age: 49
End: 2019-10-23

## 2019-10-23 DIAGNOSIS — Z01.00 EYE EXAM, ROUTINE: Primary | ICD-10-CM

## 2019-10-23 NOTE — TELEPHONE ENCOUNTER
Briseyda Ramey Staff             Good afternoon,     I have a quick question regarding Ms. Eldridge's injection scheduled for 11/1/19. The case was booked with CPT codes for MBB and RFA to be done together. It's my understanding that these injections are usually done in stages with MBB first, then RFA later. Please advise if case is correct and if MD is planning on doing both injections at the same time, or if RFA will be removed.     Thank you!   Briseyda LOPEZ.   Pre-service   [id number]

## 2019-10-27 DIAGNOSIS — E87.6 HYPOKALEMIA: Primary | ICD-10-CM

## 2019-10-28 ENCOUNTER — OUTPATIENT CASE MANAGEMENT (OUTPATIENT)
Dept: ADMINISTRATIVE | Facility: OTHER | Age: 49
End: 2019-10-28

## 2019-10-28 ENCOUNTER — OFFICE VISIT (OUTPATIENT)
Dept: SPINE | Facility: CLINIC | Age: 49
End: 2019-10-28
Payer: MEDICARE

## 2019-10-28 VITALS
WEIGHT: 106.06 LBS | HEART RATE: 96 BPM | BODY MASS INDEX: 20.82 KG/M2 | SYSTOLIC BLOOD PRESSURE: 108 MMHG | DIASTOLIC BLOOD PRESSURE: 71 MMHG | HEIGHT: 60 IN

## 2019-10-28 DIAGNOSIS — G89.29 CHRONIC BILATERAL LOW BACK PAIN WITHOUT SCIATICA: ICD-10-CM

## 2019-10-28 DIAGNOSIS — R93.7 ABNORMAL MRI, THORACIC SPINE: ICD-10-CM

## 2019-10-28 DIAGNOSIS — M54.12 CERVICAL RADICULITIS: Primary | ICD-10-CM

## 2019-10-28 DIAGNOSIS — M54.50 CHRONIC BILATERAL LOW BACK PAIN WITHOUT SCIATICA: ICD-10-CM

## 2019-10-28 PROCEDURE — 99213 OFFICE O/P EST LOW 20 MIN: CPT | Mod: S$GLB,,, | Performed by: PHYSICAL MEDICINE & REHABILITATION

## 2019-10-28 PROCEDURE — 99213 PR OFFICE/OUTPT VISIT, EST, LEVL III, 20-29 MIN: ICD-10-PCS | Mod: S$GLB,,, | Performed by: PHYSICAL MEDICINE & REHABILITATION

## 2019-10-28 NOTE — PROGRESS NOTES
SUBJECTIVE:    Patient ID: Alisson Eldridge is a 49 y.o. female.    Chief Complaint: Follow-up (Mri Results)    She is here to review her cervical MRI which was done on 10/21/2019 to evaluate her complaint of posterior neck discomfort and numbness and tingling radiating into both arms to digits 3 through 5 of both hands.  The MRI summarized below:    Impression      1. Small bilateral nerve root cysts extending from C6 through T1.  2. Degenerative disc disease at C7-T1 resulting in moderate bilateral neural foraminal narrowing.  3. No central spinal canal stenosis.  4. Questionable intrathecal mass at the T2 level.  As deemed clinically necessary, this may be evaluated further with dedicated MRI of the thoracic spine.  This report was flagged in Epic as abnormal.    Clinically she is about the same.  Still complaining of low back pain.  I note that she is scheduled for medial branch blocks at L1-2 and L2-3 facet joints.  She wants to postpone that procedure.  She says she is going to be busy around the halloween holiday        Past Medical History:   Diagnosis Date    Abnormal uterine and vaginal bleeding, unspecified 10/29/2018    Allergy     Arthritis     Asthma     Cancer     cervical    CHF (congestive heart failure)     one episode, no follow up    Chronic back pain     Depression     GERD (gastroesophageal reflux disease)     History of psychiatric care     History of psychiatric hospitalization     Menorrhagia with regular cycle 10/11/2018    Migraine     Primary dysmenorrhea 10/11/2018    Psychiatric exam requested by authority     Psychiatric problem     Scoliosis     Therapy      Social History     Socioeconomic History    Marital status: Legally      Spouse name: Not on file    Number of children: Not on file    Years of education: Not on file    Highest education level: Not on file   Occupational History    Not on file   Social Needs    Financial resource strain: Not  on file    Food insecurity:     Worry: Not on file     Inability: Not on file    Transportation needs:     Medical: Not on file     Non-medical: Not on file   Tobacco Use    Smoking status: Current Every Day Smoker     Packs/day: 1.00     Types: Cigarettes    Smokeless tobacco: Never Used    Tobacco comment: quit cold turkey twicce for 5 years with no difficulty.   Substance and Sexual Activity    Alcohol use: No    Drug use: No    Sexual activity: Yes     Partners: Male     Birth control/protection: None   Lifestyle    Physical activity:     Days per week: Not on file     Minutes per session: Not on file    Stress: Not on file   Relationships    Social connections:     Talks on phone: Not on file     Gets together: Not on file     Attends Sikhism service: Not on file     Active member of club or organization: Not on file     Attends meetings of clubs or organizations: Not on file     Relationship status: Not on file   Other Topics Concern    Patient feels they ought to cut down on drinking/drug use No    Patient annoyed by others criticizing their drinking/drug use Yes    Patient has felt bad or guilty about drinking/drug use No    Patient has had a drink/used drugs as an eye opener in the AM No   Social History Narrative    Not on file     Past Surgical History:   Procedure Laterality Date    APPENDECTOMY      COLPOSCOPY      LAPAROSCOPIC SALPINGO-OOPHORECTOMY  10/29/2018    Procedure: SALPINGO-OOPHORECTOMY, LAPAROSCOPIC;  Surgeon: Matthew Rene MD;  Location: [facility] OR;  Service: OB/GYN;;    LAPAROSCOPIC TOTAL HYSTERECTOMY N/A 10/29/2018    Procedure: HYSTERECTOMY, TOTAL, LAPAROSCOPIC;  Surgeon: Matthew Rene MD;  Location: [facility] OR;  Service: OB/GYN;  Laterality: N/A;    MULTIPLE TOOTH EXTRACTIONS       Family History   Adopted: Yes   Problem Relation Age of Onset    Breast cancer Mother     Breast cancer Maternal Grandmother      Vitals:    10/28/19 1108   BP: 108/71   Pulse: 96    Weight: 48.1 kg (106 lb 0.7 oz)   Height: 5' (1.524 m)       Review of Systems   Constitutional: Negative for chills, diaphoresis, fatigue, fever and unexpected weight change.   HENT: Negative for trouble swallowing.    Eyes: Negative for visual disturbance.   Respiratory: Negative for shortness of breath.    Cardiovascular: Negative for chest pain.   Gastrointestinal: Negative for abdominal pain, constipation, nausea and vomiting.   Genitourinary: Negative for difficulty urinating.   Musculoskeletal: Negative for arthralgias, back pain, gait problem, joint swelling, myalgias, neck pain and neck stiffness.   Neurological: Negative for dizziness, speech difficulty, weakness, light-headedness, numbness and headaches.          Objective:      Physical Exam   Constitutional: She is oriented to person, place, and time. She appears well-developed and well-nourished.   Neurological: She is alert and oriented to person, place, and time.           Assessment:       1. Cervical radiculitis    2. Chronic bilateral low back pain without sciatica    3. Abnormal MRI, thoracic spine           Plan:     MRI of the thoracic spine with and without contrast follow-up on the possible epidural mass at T2.  Will get EMG and nerve conduction studies to evaluate her complaint of numbness and tingling in her hands and weakness in the hands.  I do not have an explanation for that based on the MRI findings.  She can follow up with me after the studies      Cervical radiculitis  -     EMG W/ ULTRASOUND AND NERVE CONDUCTION TEST 2 Extremities; Future    Chronic bilateral low back pain without sciatica    Abnormal MRI, thoracic spine  -     MRI Thoracic Spine W WO Cont; Future; Expected date: 10/28/2019

## 2019-10-30 ENCOUNTER — OUTPATIENT CASE MANAGEMENT (OUTPATIENT)
Dept: ADMINISTRATIVE | Facility: OTHER | Age: 49
End: 2019-10-30
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
## 2019-11-05 ENCOUNTER — HOSPITAL ENCOUNTER (OUTPATIENT)
Dept: RADIOLOGY | Facility: HOSPITAL | Age: 49
Discharge: HOME | End: 2019-11-05
Attending: PHYSICAL MEDICINE & REHABILITATION
Payer: MEDICARE

## 2019-11-05 DIAGNOSIS — R93.7 ABNORMAL MRI, THORACIC SPINE: ICD-10-CM

## 2019-11-05 PROCEDURE — A9585 GADOBUTROL INJECTION: HCPCS | Performed by: PHYSICAL MEDICINE & REHABILITATION

## 2019-11-05 PROCEDURE — 72157 MRI CHEST SPINE W/O & W/DYE: CPT | Mod: TC

## 2019-11-05 PROCEDURE — 72157 MRI CHEST SPINE W/O & W/DYE: CPT | Mod: 26,,, | Performed by: RADIOLOGY

## 2019-11-05 PROCEDURE — 25500020 PHARM REV CODE 255: Performed by: PHYSICAL MEDICINE & REHABILITATION

## 2019-11-05 PROCEDURE — 72157 MRI THORACIC SPINE W WO CONTRAST: ICD-10-PCS | Mod: 26,,, | Performed by: RADIOLOGY

## 2019-11-05 RX ORDER — GADOBUTROL 604.72 MG/ML
5 INJECTION INTRAVENOUS
Status: COMPLETED | OUTPATIENT
Start: 2019-11-05 | End: 2019-11-05

## 2019-11-05 RX ADMIN — GADOBUTROL 5 ML: 604.72 INJECTION INTRAVENOUS at 01:11

## 2019-11-08 ENCOUNTER — OFFICE VISIT (OUTPATIENT)
Dept: PHYSICAL MEDICINE AND REHAB | Facility: CLINIC | Age: 49
End: 2019-11-08
Payer: MEDICARE

## 2019-11-08 DIAGNOSIS — M54.12 CERVICAL RADICULITIS: ICD-10-CM

## 2019-11-08 PROCEDURE — 95886 MUSC TEST DONE W/N TEST COMP: CPT | Mod: PBBFAC,PN | Performed by: PHYSICAL MEDICINE & REHABILITATION

## 2019-11-08 PROCEDURE — 95910 NRV CNDJ TEST 7-8 STUDIES: CPT | Mod: PBBFAC,PN | Performed by: PHYSICAL MEDICINE & REHABILITATION

## 2019-11-08 PROCEDURE — 95886 MUSC TEST DONE W/N TEST COMP: CPT | Mod: 26,S$PBB,, | Performed by: PHYSICAL MEDICINE & REHABILITATION

## 2019-11-08 PROCEDURE — 95910 NRV CNDJ TEST 7-8 STUDIES: CPT | Mod: 26,S$PBB,, | Performed by: PHYSICAL MEDICINE & REHABILITATION

## 2019-11-08 PROCEDURE — 95910 PR NERVE CONDUCTION STUDY; 7-8 STUDIES: ICD-10-PCS | Mod: 26,S$PBB,, | Performed by: PHYSICAL MEDICINE & REHABILITATION

## 2019-11-08 PROCEDURE — 99499 NO LOS: ICD-10-PCS | Mod: S$PBB,,, | Performed by: PHYSICAL MEDICINE & REHABILITATION

## 2019-11-08 PROCEDURE — 99499 UNLISTED E&M SERVICE: CPT | Mod: S$PBB,,, | Performed by: PHYSICAL MEDICINE & REHABILITATION

## 2019-11-08 PROCEDURE — 95886 PR EMG COMPLETE, W/ NERVE CONDUCTION STUDIES, 5+ MUSCLES: ICD-10-PCS | Mod: 26,S$PBB,, | Performed by: PHYSICAL MEDICINE & REHABILITATION

## 2019-11-08 NOTE — LETTER
November 8, 2019      Theron Long MD  [address] [place] [address] [address]  [address]  [place] [address]           [place] [facility]  [address] [place] [address] [address]  [facility] [address]  Phone: [phone]  Fax: [phone]          Patient: Alisson Eldridge   MR Number: [id number]   YOB: 1970   Date of Visit: 11/8/2019       Dear Dr. Therno Long:    Thank you for referring Alisson Eldridge to me for evaluation. Attached you will find relevant portions of my assessment and plan of care.    If you have questions, please do not hesitate to call me. I look forward to following Alisson Eldridge along with you.    Sincerely,    Nay Kemp,     Enclosure  CC:  No Recipients    If you would like to receive this communication electronically, please contact [email or web address] or [phone] to request more information on [employer] access.    For providers and/or their staff who would like to refer a patient to [facility], please contact us through our one-stop-shop provider referral line, [facility], at [phone].    If you feel you have received this communication in error or would no longer like to receive these types of communications, please e-mail [email or web address]

## 2019-11-08 NOTE — PROGRESS NOTES
[facility]  Physical Medicine and Rehabilitation   [address] [place] [address], [address]  [place] [address]             Patient: Alisson Eldridge   Patient ID: [id number]   Sex:     Date of Birth:     Age:     Notes:     Last visit date: 11/8/2019         Visit date and time: 11/8/2019 12:04   Patient Age on Visit Date:     Referring Physician:     Diagnoses:       Right hand numbness      Sensory NCS      Nerve / Sites Rec. Site Onset Lat Peak Lat Ref. NP Amp Ref. PP Amp Ref. Segments Distance Velocity     ms ms ms µV µV µV µV  cm m/s   R Median - Digit II (Antidromic)      Wrist Dig II 2.97 3.70 ?3.40 21.1 ?15.0 53.3 ?20.0 Wrist - Dig II 13 44   L Median - Digit II (Antidromic)      Wrist Dig II 2.60 3.28 ?3.40 16.9 ?15.0 28.2 ?20.0 Wrist - Dig II 13 50   R Ulnar - Digit V (Antidromic)      Wrist Dig V 1.93 2.45 ?3.10 18.6 ?10.0 49.2 ?15.0 Wrist - Dig V 11 57   L Ulnar - Digit V (Antidromic)      Wrist Dig V 1.72 2.34 ?3.10 35.7 ?10.0 57.9 ?15.0 Wrist - Dig V 11 64       Motor NCS      Nerve / Sites Muscle Latency Ref. Amplitude Ref. Amp % Duration Segments Distance Lat Diff Velocity Ref.     ms ms mV mV % ms  cm ms m/s m/s   L Median - APB      Wrist APB 3.13 ?4.40 9.6 ?4.0 100 8.54 Wrist - APB 7         Elbow APB 6.72  5.9  61.1 8.80 Elbow - Wrist 18.5 3.59 51 ?49   R Median - APB      Wrist APB 3.23 ?4.40 6.8 ?4.0 100 7.50 Wrist - APB 7         Elbow APB 7.55  5.9  86.3 9.06 Elbow - Wrist 20 4.32 46 ?49   L Ulnar - ADM      Wrist ADM 2.29 ?3.60 5.8 ?5.0 100 6.77 Wrist - ADM 7         B.Elbow ADM 5.26  5.4  93.1 7.66 B.Elbow - Wrist 17.5 2.97 59 ?49      A.Elbow ADM 6.88  5.2  90.4 7.60 A.Elbow - B.Elbow 10 1.61 62 ?49   R Ulnar - ADM      Wrist ADM 2.40 ?3.60 5.6 ?5.0 100 9.38 Wrist - ADM 7         B.Elbow ADM 5.47  5.3  93.8 9.43 B.Elbow - Wrist 19 3.07 62 ?49      A.Elbow ADM 7.14  5.0  89.3 9.06 A.Elbow - B.Elbow 10 1.67 60 ?49       EMG Summary Table     Spontaneous MUAP Recruitment   Muscle IA Fib  PSW Fasc H.F. Amp Dur. PPP Pattern   L. Triceps brachii N None None None None N N N N   R. Triceps brachii N None None None None N N N N   L. Deltoid N None None None None N N N N   R. Deltoid N None None None None N N N N   L. Biceps brachii N None None None None N N N N   R. Biceps brachii N None None None None N N N N   L. Extensor carpi radialis brevis N None None None None N N N N   R. Extensor carpi radialis brevis N None None None None N N N N   L. First dorsal interosseous N None None None None N N N N   R. First dorsal interosseous N None None None None N N N N   L. Cervical paraspinals (mid) N None None None None N N N N   R. Cervical paraspinals (mid) N None None None None N N N N   L. Cervical paraspinals (low) N None None None None N N N N   R. Cervical paraspinals (low) N None None None None N N N N       Summary    The motor conduction test was performed on 4 nerve(s). The results were normal in 3 nerve(s): L Median - APB, L Ulnar - ADM, R Ulnar - ADM. Results outside the specified normal range were found in 1 nerve(s), as follows:   In the R Median - APB study  o the take off velocity result was reduced for Elbow - Wrist segment    The sensory conduction test was performed on 4 nerve(s). The results were normal in 3 nerve(s): L Median - Digit II (Antidromic), R Ulnar - Digit V (Antidromic), L Ulnar - Digit V (Antidromic). Results outside the specified normal range were found in 1 nerve(s), as follows:   In the R Median - Digit II (Antidromic) study  o the peak latency result was increased for Wrist stimulation    The needle EMG study was normal in all 14 tested muscles: L. Triceps brachii, R. Triceps brachii, L. Deltoid, R. Deltoid, L. Biceps brachii, R. Biceps brachii, L. Extensor carpi radialis brevis, R. Extensor carpi radialis brevis, L. First dorsal interosseous, R. First dorsal interosseous, L. Cervical paraspinals (mid), R. Cervical paraspinals (mid), L. Cervical paraspinals (low), R.  Cervical paraspinals (low).                Conclusion:     Moderate right carpal tunnel syndrome  NO electrophysiologic evidence of a cervical radiculopathy              ____________________________  Nay Man D.O.

## 2019-11-15 ENCOUNTER — OUTPATIENT CASE MANAGEMENT (OUTPATIENT)
Dept: ADMINISTRATIVE | Facility: OTHER | Age: 49
End: 2019-11-15

## 2019-11-18 ENCOUNTER — OUTPATIENT CASE MANAGEMENT (OUTPATIENT)
Dept: ADMINISTRATIVE | Facility: OTHER | Age: 49
End: 2019-11-18

## 2019-11-18 NOTE — PROGRESS NOTES
Follow up call made with pt who confirms receipt of and read mailed resources.  Pt states CHRIS is assisting with their utility bill and her significant other is applying for food stamps.  They will go to the food bank if needed.  No other needs identified.  Goal met; pt agrees to case closure and understands she can call should any needs arise in the future.  LCSW will close case and notify OPCM RN

## 2019-11-18 NOTE — PROGRESS NOTES
Outpatient Care Management   - Care Plan Follow Up    Patient: Alisson Eldridge  MRN:  [id number]  Date of Service:  11/18/2019  Completed by:  Shalini Delarosa LCSW  Referral Date: 10/04/2019  Program: Case Management (High Risk)    Reason for Visit   Patient presents with    Case Closure       Complex Care Plan     Care plan was discussed and completed today with input from patient and/or caregiver.    Goals      Patient/caregiver will have an action plan in place to manage and prevent complications of Anemia prior to discharge from [facility]. - Priority: High      Overall Time to Completion  2 months from 10/17/2019     [facility] Identified Patient Barriers:  Advance Care Planning - Referred to [facility]   Nutrition - Referred to [facility]   Communication - Referred to [facility]   Financial - Referred to [facility]   Fall Risk - Care Plan Created     [facility] Identified Disease Education Barriers:  Education Barrier For Anemia - Care Plan Created     Short Term Goals  Patient will discuss health care needs with Physician and care team during visits or using Patient Portal within 6 weeks.  Interventions   · Empower patient to discuss treatment plan with Physician/care team. - Done 10/17/19  · Educate patient on the importance of compliance with Physician follow-ups.  - Done 10/17/19  · Facilitate medical appointments as needed.  · Provide contact information for [facility]. - Done 10/17/19 [phone]  · Provide contact information for Physician office.  - Done 10/17/19 [phone]     Status  · Partially met    Patient will verbalize 3 food items that are high in iron within 3 weeks.   Foods High in Iron   Beef, liver, poultry, fish, eggs   Dried fruits (especially raisins, prunes, figs)    Legumes such as dried beans and lentils    Breads and cereals with iron added   Blackstrap molasses   Spinach and other green leafy vegetables   Foods cooked in cast-iron pans  (especially true of acidic foods, such as tomatoes and vickie)    Interventions   · Assess for patient's ability to verbalize at least 3 food items that are high in iron.   · Educate patient on the importance of Dietary Compliance.  · Recognize and provide educational material (KRATATYANA) on Iron Deficiency Anemia. - Mailed 10/17/19  · Review food groups/types of food that are high in iron.  · Educate patient on the importance of including foods that are high in iron to her diet.  Patient agrees to add at least 2 foods that are high in iron to her diet within the next 2-3 weeks.   Patient agrees to OPCM follow up within 10-12 days to assess progress to goal.      Status  · Partially met    Patient will verbalize 3 signs and symptoms of Anemia within 1 month.    Signs and Symptoms of Anemia   Anemia makes you feel tired and run down.    When anemia becomes severe, your skin becomes pale.    You may feel short of breath after physical activity.    Other symptoms include:   Headaches   Dizziness   Leg cramps with physical activity   Drowsiness   Restless legs    Interventions   · Assess for patient's ability to verbalize at least 3 signs and symptoms of Anemia.  · Recognize and provide educational material (KRAMES) on Iron Deficiency Anemia. - Mailed 10/17/19  · Review signs and symptoms of Anemia with patient. - Done 10/17/19  Patient agrees to review the educational information on Anemia within the next 2-3 weeks.   Patient agrees to OPCM follow up within 10-12 days to assess progress to goal.      Status  · Partially met    Patient will verbalize 2 guidelines when caring for yourself at home with Anemia within 1 month.   Guidelines When Caring for Yourself at Home   Eat foods high in iron. This will boost the amount of iron stored in your body. It is a natural way to build up the number of blood cells.    Do not overexert yourself.   Talk with your healthcare provider before traveling by air or traveling to high  altitudes.   Follow up with your healthcare provider in 2 months, or as advised.    Comply with laboratory testing as ordered by physician to be sure your anemia has been fixed.   Call your healthcare provider right away if any of these occur:   Shortness of breath or chest pain   Dizziness or fainting   Vomiting blood or passing red or black-colored stool     Interventions   · Assess for patient's ability to verbalize at least 2 guidelines when caring for yourself at home with Anemia.  · Empower patient to discuss treatment plan with Physician/care team. - Done 10/17/19  · Educate patient on the importance of compliance with scheduled laboratory testing and procedure. - Done 10/17/19  · Educate patient on the importance of compliance with Dietary Compliance. - Done 10/17/19  · Educate patient on the importance of compliance with Medication Compliance. - Done 10/17/19  · Recognize and provide educational material (KRAMES) on Iron Deficiency Anemia. - Mailed 10/17/19  · Review with patient guidelines when caring for yourself at home with Anemia.  Patient agrees to review the educational information on Anemia within the next 2-3 weeks.   Patient agrees to [facility]PC follow up within 10-12 days to assess progress to goal.      Status  · Partially met                   Patient/caregiver will have an action plan in place to manage and prevent complications of CHF prior to discharge from [facility] - Priority: High      Overall Time to Completion  2 months from 10/17/2019     [facility] Identified Patient Barriers:  Advance Care Planning - Referred to O[facility]   Nutrition - Referred to [facility]   Communication - Referred to [facility]   Financial - Referred to [facility]   Fall Risk - Care Plan Created     [facility] Identified Disease Education Barriers:  Education Barrier For Anemia - Care Plan Created  Education Barrier for CHF - Care Plan Created    Short Term Goals  Patient will measure and record the weight  1 times per day for 3 weeks.  Interventions   · Assess for availability of working scale in home setting. - Done 10/17/19 (Yes)  · Mail Weight log for home use. - Mailed 10/17/19  · Recognize and provide educational material (KRAMES) on CHF/CHF Flare. - Mailed 10/17/19  · Assess for compliance with daily measuring and logging of weight. - 10/17/19 Not weighing at all  · Review weight log with patient at follow up calls.   Patient agrees to start to weigh and log daily for the next 3 weeks.  · Patient agrees to OPCM follow up within 10-12 days to assess progress to goal.      Status  · Partially met    Patient will verbalize importance of notifying doctor if gains more than 2 pounds in one day or 5 or more pounds in one week within 5 weeks.  Interventions   · Assess for patient's ability to verbalize importance of MD notification for sudden weight gain.   · Empower patient to discuss her history of CHF with current  Physician/care team. - Done 10/17/19  · Educate patient on the importance of compliance with Physician follow-ups. - Done 10/17/19  · Educate patient on the importance of Dietary Compliance (low sodium) to help prevent fluid retention. - Done 10/17/19  · Educate patient on the importance of Medication Compliance. - Done 10/17/19  · Recognize and provide educational material (KRAMES) on CHF/CHF Flare. - Mailed 10/17/19  Patient agrees to review the educational literature on CHF/CHF Flare within the next 3-4 weeks.   · Patient agrees to OPCM follow up within 10-12 days to assess progress to goal.       Status  · Partially met    Patient will verbalize 2 signs and symptoms of Congestive Heart Failure/CHF Flare within 2 months.    Signs and Symptoms of CHF/CHF Flare   Your feet, ankles, or lower legs get puffier.   You notice skin changes on your lower legs.   Your shoes feel too tight.   Your clothes are tighter in the waist.   You have trouble getting rings on or off your fingers.   You have to breathe  harder even when youre doing your normal activities or when youre resting.   You are short of breath walking upstairs or even short distances.   You wake up at night short of breath or coughing.   You need to use more pillows or sit up to sleep.   You wake up tired or restless.   You feel weaker, dizzy, or more tired.   You have chest pain or changes in your heartbeat.   You have a cough that wont go away.   You cant remember things or dont feel like eating.    Interventions   · Assess for patient's ability to verbalize at least 2 signs and symptoms of CHF/CHF Flare.   · Recognize and provide educational material (KRAMES) on CHF/CHF Flare. - Mailed 10/17/19  · Review signs and symptoms of CHF/CHF Flare with patient.   Patient agrees to review the educational literature on CHF/CHF Flare within the next 3-4 weeks.   · Patient agrees to OPCM follow up within 10-12 days to assess progress to goal.       Status  · Partially met    Patient will verbalize 2 ways of preventing complications due to disease process within 2 months.   Ways to Prevent Complications   Check your weight every day; sudden increase in weight gain could mean worsening heart failure.     Keep these things in mind:  ? Use the same scale every day.  ? Weigh yourself at the same time every day.  ? Make sure the scale is on a hard floor surface, not on a rug or carpet.  ? Keep a record of your weight every day so your healthcare provider can see it. If you are not given a log sheet for this, keep a separate journal for this purpose.    Cut back on the amount of salt (sodium) you eat.   ? Avoid high-salt foods. These include olives, pickles, smoked meats, salted potato chips, and most prepared foods.  ? Don't add salt to your food at the table. Use only small amounts of salt when cooking.  ? Read the labels carefully on food packages to learn how much salt or sodium is in each serving in the package. Remember, a can or package of food may contain  more than 1 serving. If you eat all the food in the package, you may be getting more salt than you think.  Follow your healthcare provider's recommendations about how much fluid you should have. Be aware that some foods, such as soup, pudding, and juicy fruits like oranges or melons, contain liquid. You'll need to count the liquid in those foods as part of your daily fluid intake.    If you smoke, stop smoking.   Cut back on how much alcohol you drink.  Lose weight if you are overweight. The excess weight adds a lot of stress on the workload of the heart.  Stay active. Talk with your provider about an exercise program that is safe for your heart.  Keep your feet elevated to reduce swelling. Ask your provider about support hose as a preventive treatment for daytime leg swelling.    Interventions   · Assess for patient's ability to verbalize at least 2 ways of preventing complications due to disease process.   · Collaborate with Physician as appropriate to meet patient needs.  · Empower patient to discuss treatment plan with Physician/care team.   · Educate patient on the importance of compliance with Physician follow-ups.   · Educate patient on the importance of Dietary Compliance (low sodium).   · Educate patient on the importance of Exercise as tolerated.   · Educate patient on the importance of Medication Compliance.   · Educate patient on the importance of  Smoking Cessation.  · Mail patient a copy of [facility] Smoking Cessation brochure. - Mailed 10/17/19  · Provide contact information for Coordinator of Tobacco Cessation Community Outreach, Ya Ford ([phone]).  · Recognize and provide educational material (KRAMES) on CHF/CHF Flare and Smoking Cessation. - Mailed 10/17/19  · Review ways of preventing complications due to disease process with patient.   Patient agrees to review the educational literature on CHF/CHF Flare and Smoking Cessation within the next 3-4 weeks.   · Patient agrees to OPCM  follow up within 10-12 days to assess progress to goal.       Status  · Partially met           Patient/caregiver will have knowledge of resources available in order to obtain the services that are needed prior to discharge from [facility]. - Priority: Medium      Overall Time to Completion  1 month from 10/30/2019     [facility] Identified Patient Barriers:  Advance Care Planning - Referred to [facility] -Deferred-OPCM RN  Nutrition - Referred to [facility] -SW Assessment completed  Communication - Referred to [facility] -SW Assessment completed and Care Plan created  Financial - Referred to [facility] -SW Assessment completed  Fall Risk - Care Plan Created     [facility] Identified Disease Education Barriers:  Education Barrier For Anemia - Care Plan Created     Social Work Identified Patient Barriers:   Cognitive:  Kf-Qyprquuq-Ct needs identified  Support:  No-No needs identified  Advanced Care Planning:  Yes-Deferred-OPCM RN  Safety:  Rm-Nojqwqoz-Sh concerns identified  Mental Health:  Yes-Deferred-Pt declines the need for mental health resources      Financial: Yes-Care Plan created       Short Term Goals  Patient/caregiver will identify 1 community resources to assist with obtaining food, assist with paying utility bills and hearing aids within 1 month.  Interventions   · Collaborate with OPCM RN as appropriate to meet patient needs-ongoing  · Complete community search for available resources on [employer] [place] or utility assistance or hearing aid-10/30/19  · Contact Agency: hearing aid program to verify eligibility and application process.   · Discuss patient care needs and potential barriers-10/30/19.  · Provide financial assistance resource(s)-[employer] [place] mailed 10/30/19.  · Provide supportive counseling-ongoing.  · Provide utility assistance resource(s)-mailed [employer] 10/30/19.    Patient/Caregiver agrees to review mailed resources by 11/13/19-done.  Patient/Caregiver agrees to [facility]PC follow up within 2  weeks to assess progress to goal.      Status  · Met             Patient/caregiver will have Safety Plan in place prior to discharge from hospitals. - Priority: Medium      Overall Time to Completion  2 months from 10/17/2019     [facility] Identified Patient Barriers:  Advance Care Planning - Referred to O[facility]   Nutrition - Referred to O[facility]   Communication - Referred to [facility]   Financial - Referred to [facility]   Fall Risk - Care Plan Created     hospitals Identified Disease Education Barriers:  Education Barrier For Anemia - Care Plan Created     Short Term Goals  Patient will put in place at least 4 applicable home safety measures to decrease the risk of patient falls within 2 months.  Interventions   · Assess patient's ability to perform ADLs. - Done 10/17/19  · Assess for compliance with at least 4 applicable home safety measures to decrease the risk of patient falls.   · Educate patient on the importance of daily Exercise as tolerated. - Done 10/17/19  · Encourage S/O to help patient perform ADLs according to the patient's capabilities.- Done 10/17/19   · Facilitate needed DME. - 10/17/19 Denies need for DME at this time  · Facilitate referral to Outpatient Rehab/Physical Therapy.   · In basket message sent to Physician regarding referral to Optometry for Eye Exam. - Sent message 10/18/19  · Recognize and provide educational material (KB) on Fall Prevention. - Mailed 10/17/19   Review recommended Fall Prevention safety measures with patient:  Patient agrees to review Fall Prevention educational resources within the next 2-3 weeks.   Patient agrees to hospitals follow up within 10-12 days to assess progress to goal.   Keep floors and stairs free of clutter and cords.   Arrange furniture so there are clear pathways.   Clean up any spills right away.  Install grab bars in the tub or shower. - 10/17/18 Denies need for grab bars.    Apply nonskid strips or put a nonskid rubber mat in  the tub or shower.   Sit on a bath chair or tub bench to bathe. - 10/17/18 Denies need for chair or bench.    Use bathmats with nonskid backing.   Use non-slip pads under rugs. Do not use area rugs or small throw rugs.   Do not walk in poorly lit areas.   Keep a flashlight in each room. Or put a lamp next to the bed within easy reach.   Put nightlights in the bedrooms, hallways, kitchen, and bathrooms.   Do not stand on chairs or wobbly ladders.  Use caution when reaching overhead or looking upward. This position can cause a loss of balance.   Move or rearrange items that you use often. This will make them easier to find or reach.   Be sure your shoes fit properly, have non-slip bottoms and are in good condition.    Wear shoes both inside and outside. Avoid going barefoot or wearing slippers.  Be cautious when going up and down stairs, curbs, and when walking on uneven sidewalks.   If your balance is poor, consider using a cane or walker. - 10/17/18 Denies need for cane or walker.  If your fall was related to use of sleeping medicines, talk to your doctor. Bedtime dosage may need to be reduced if you awaken during the night to go to the bathroom.     To reduce the need for nighttime bathroom trips:  ? Avoid drinking fluids for several hours before going to bed  ? Empty your bladder before going to bed  ? Women can utilize a bedside commode  Stay as active as you can. Balance, flexibility, strength, and endurance all come from exercise. They all play a role in preventing falls. Ask your healthcare provider which types of activity are right for you.   Get your vision checked on a regular basis. - 10/17/19 Sent message to PCP for Optometry referral for Eye Exam  Look at your home to find any safety hazards. Especially look at doorways, walkways, and the driveway. Remove or repair any safety problems that you find.     Status  · Partially met                  Patient Instructions     Instructions were provided via the  [facility] patient resources and are available for the patient to view on the patient portal.    No follow-ups on file.    Todays OPCM Self-Management Care Plan was developed with the patients/caregivers input and was based on identified barriers from todays assessment.  Goals were written today with the patient/caregiver and the patient has agreed to work towards these goals to improve his/her overall well-being. Patient verbalized understanding of the care plan, goals, and all of today's instructions. Encouraged patient/caregiver to communicate with his/her physician and health care team about health conditions and the treatment plan.  Provided my contact information today and encouraged patient/caregiver to call me with any questions as needed.

## 2019-11-21 ENCOUNTER — OFFICE VISIT (OUTPATIENT)
Dept: SPINE | Facility: CLINIC | Age: 49
End: 2019-11-21
Payer: MEDICARE

## 2019-11-21 VITALS — WEIGHT: 106.06 LBS | HEIGHT: 60 IN | BODY MASS INDEX: 20.82 KG/M2

## 2019-11-21 DIAGNOSIS — K76.89 HEPATIC CYST: Primary | ICD-10-CM

## 2019-11-21 DIAGNOSIS — M54.50 CHRONIC BILATERAL LOW BACK PAIN WITHOUT SCIATICA: ICD-10-CM

## 2019-11-21 DIAGNOSIS — G89.29 CHRONIC BILATERAL LOW BACK PAIN WITHOUT SCIATICA: ICD-10-CM

## 2019-11-21 DIAGNOSIS — G56.01 CARPAL TUNNEL SYNDROME OF RIGHT WRIST: ICD-10-CM

## 2019-11-21 PROCEDURE — 99213 OFFICE O/P EST LOW 20 MIN: CPT | Mod: S$GLB,,, | Performed by: PHYSICAL MEDICINE & REHABILITATION

## 2019-11-21 PROCEDURE — 99213 PR OFFICE/OUTPT VISIT, EST, LEVL III, 20-29 MIN: ICD-10-PCS | Mod: S$GLB,,, | Performed by: PHYSICAL MEDICINE & REHABILITATION

## 2019-11-21 RX ORDER — HYDROCODONE BITARTRATE AND ACETAMINOPHEN 7.5; 325 MG/1; MG/1
1 TABLET ORAL EVERY 6 HOURS PRN
Qty: 28 TABLET | Refills: 0 | Status: ON HOLD | OUTPATIENT
Start: 2019-11-21 | End: 2020-09-25 | Stop reason: HOSPADM

## 2019-11-21 NOTE — PROGRESS NOTES
SUBJECTIVE:    Patient ID: Alisson Eldridge is a 49 y.o. female.    Chief Complaint: Follow-up (MRI and EMG results)    She is here to review her EMG and nerve conduction studies which were done on 11/08/2019 by Dr. Man to evaluate her complaint of bilateral hand numbness and tingling involving digits 3 through 5 of both hands.  The EMG and nerve conduction studies are summarized below:    Conclusion:      Moderate right carpal tunnel syndrome  NO electrophysiologic evidence of a cervical radiculopathy      We also discussed the MRI of the thoracic spine which was done on 11/05/2019 to follow-up on the finding of a questionable intrathecal mass at T2 which was seen incidentally on cervical MRI.  The thoracic spine MRI summarized:    Impression      1. There is no acute or significant abnormality within the thoracic spine.  There is no fracture or malalignment.  There is no spinal stenosis or cord compression.  There is no significant foraminal stenosis or suspected nerve root compression.  There is no pathologic enhancement in the vertebral column or spinal canal.  2. A small intradural lesion identified on comparison cervical spine MRI is T2 hypointense without associated soft tissue mass or enhancement in could simply represent a small dural calcification although is nonspecific.  3. There is a well-defined lesion seen in the right upper abdomen on the axial images but is incompletely included.  This could represent a 5 x 4 cm hepatics cyst.  However, further evaluation could be obtained with ultrasound or CT.     Current clinical complaints or mild posterior neck discomfort and numbness and tingling in only digits 2 through 4 of the right hand.  Denies any significant numbness and tingling in the left hand.  No vielka radicular symptoms in the arms.  This is a change from her previous complain of numbness and tingling in digits 3 through 5 of both hands.  She continues to complain of back pain from the  midthoracic region to the lumbosacral junction.  She still wishes to postpone the medial branch blocks and radiofrequency ablation previously ordered until after the holiday season.  We discussed the finding of suspected hepatic cysts seen incidentally on the thoracic MRI.  She has no previous history of cyst and we will schedule an ultrasound to further evaluate.  The she has been taking Tylenol for pain which is ineffective and causes GI upset according to her.  She would like something stronger for pain temporarily into she has procedure done            Past Medical History:   Diagnosis Date    Abnormal uterine and vaginal bleeding, unspecified 10/29/2018    Allergy     Arthritis     Asthma     Cancer     cervical    CHF (congestive heart failure)     one episode, no follow up    Chronic back pain     Depression     GERD (gastroesophageal reflux disease)     History of psychiatric care     History of psychiatric hospitalization     Menorrhagia with regular cycle 10/11/2018    Migraine     Primary dysmenorrhea 10/11/2018    Psychiatric exam requested by authority     Psychiatric problem     Scoliosis     Therapy      Social History     Socioeconomic History    Marital status: Legally      Spouse name: Not on file    Number of children: Not on file    Years of education: Not on file    Highest education level: Not on file   Occupational History    Not on file   Social Needs    Financial resource strain: Not on file    Food insecurity:     Worry: Not on file     Inability: Not on file    Transportation needs:     Medical: Not on file     Non-medical: Not on file   Tobacco Use    Smoking status: Current Every Day Smoker     Packs/day: 1.00     Types: Cigarettes    Smokeless tobacco: Never Used    Tobacco comment: quit cold turkey twicce for 5 years with no difficulty.   Substance and Sexual Activity    Alcohol use: No    Drug use: No    Sexual activity: Yes     Partners: Male      Birth control/protection: None   Lifestyle    Physical activity:     Days per week: Not on file     Minutes per session: Not on file    Stress: Not on file   Relationships    Social connections:     Talks on phone: Not on file     Gets together: Not on file     Attends Nondenominational service: Not on file     Active member of club or organization: Not on file     Attends meetings of clubs or organizations: Not on file     Relationship status: Not on file   Other Topics Concern    Patient feels they ought to cut down on drinking/drug use No    Patient annoyed by others criticizing their drinking/drug use Yes    Patient has felt bad or guilty about drinking/drug use No    Patient has had a drink/used drugs as an eye opener in the AM No   Social History Narrative    Not on file     Past Surgical History:   Procedure Laterality Date    APPENDECTOMY      COLPOSCOPY      LAPAROSCOPIC SALPINGO-OOPHORECTOMY  10/29/2018    Procedure: SALPINGO-OOPHORECTOMY, LAPAROSCOPIC;  Surgeon: Matthew Rene MD;  Location: [facility] OR;  Service: OB/GYN;;    LAPAROSCOPIC TOTAL HYSTERECTOMY N/A 10/29/2018    Procedure: HYSTERECTOMY, TOTAL, LAPAROSCOPIC;  Surgeon: Matthew Rene MD;  Location: [facility] OR;  Service: OB/GYN;  Laterality: N/A;    MULTIPLE TOOTH EXTRACTIONS       Family History   Adopted: Yes   Problem Relation Age of Onset    Breast cancer Mother     Breast cancer Maternal Grandmother      Vitals:    11/21/19 1047   Weight: 48.1 kg (106 lb 0.7 oz)   Height: 5' (1.524 m)       Review of Systems   Constitutional: Negative for chills, diaphoresis, fatigue, fever and unexpected weight change.   HENT: Negative for trouble swallowing.    Eyes: Negative for visual disturbance.   Respiratory: Negative for shortness of breath.    Cardiovascular: Negative for chest pain.   Gastrointestinal: Negative for abdominal pain, constipation, nausea and vomiting.   Genitourinary: Negative for difficulty urinating.   Musculoskeletal:  Negative for arthralgias, back pain, gait problem, joint swelling, myalgias, neck pain and neck stiffness.   Neurological: Negative for dizziness, speech difficulty, weakness, light-headedness, numbness and headaches.          Objective:      Physical Exam   Constitutional: She is oriented to person, place, and time. She appears well-developed and well-nourished.   Neurological: She is alert and oriented to person, place, and time.           Assessment:       1. Hepatic cyst    2. Carpal tunnel syndrome of right wrist    3. Chronic bilateral low back pain without sciatica           Plan:     she should try a brace on the right hand at night for 6 weeks for carpal tunnel syndrome.  Consider injection or surgical intervention.  Ultrasound of the abdomen.  I can report that study to her over the phone provided is benign.  Short-term hydrocodone as needed for pain.  Follow-up here as needed.  She can inform my office if she decides to proceed with the medial branch blocks and radiofrequency ablation      Hepatic cyst  -     US Abdomen Complete; Future; Expected date: 11/21/2019    Carpal tunnel syndrome of right wrist    Chronic bilateral low back pain without sciatica  -     HYDROcodone-acetaminophen (NORCO) 7.5-325 mg per tablet; Take 1 tablet by mouth every 6 (six) hours as needed.  Dispense: 28 tablet; Refill: 0

## 2019-12-03 ENCOUNTER — HOSPITAL ENCOUNTER (OUTPATIENT)
Dept: RADIOLOGY | Facility: HOSPITAL | Age: 49
Discharge: HOME | End: 2019-12-03
Attending: PHYSICAL MEDICINE & REHABILITATION
Payer: MEDICARE

## 2019-12-03 DIAGNOSIS — K76.89 HEPATIC CYST: ICD-10-CM

## 2019-12-03 PROCEDURE — 76700 US ABDOMEN COMPLETE: ICD-10-PCS | Mod: 26,,, | Performed by: RADIOLOGY

## 2019-12-03 PROCEDURE — 76700 US EXAM ABDOM COMPLETE: CPT | Mod: TC

## 2019-12-03 PROCEDURE — 76700 US EXAM ABDOM COMPLETE: CPT | Mod: 26,,, | Performed by: RADIOLOGY

## 2019-12-04 ENCOUNTER — TELEPHONE (OUTPATIENT)
Dept: SPINE | Facility: CLINIC | Age: 49
End: 2019-12-04

## 2019-12-04 NOTE — TELEPHONE ENCOUNTER
Pt advised of finding and advised to contact PCP/Verbal understanding.  Message sent to Delon rolon

## 2019-12-18 ENCOUNTER — OFFICE VISIT (OUTPATIENT)
Dept: FAMILY MEDICINE | Facility: CLINIC | Age: 49
End: 2019-12-18
Payer: MEDICARE

## 2019-12-18 VITALS
SYSTOLIC BLOOD PRESSURE: 100 MMHG | WEIGHT: 112.19 LBS | HEIGHT: 60 IN | TEMPERATURE: 98 F | BODY MASS INDEX: 22.03 KG/M2 | DIASTOLIC BLOOD PRESSURE: 80 MMHG | HEART RATE: 75 BPM

## 2019-12-18 DIAGNOSIS — K76.89 HEPATIC CYST: ICD-10-CM

## 2019-12-18 DIAGNOSIS — J01.01 ACUTE RECURRENT MAXILLARY SINUSITIS: ICD-10-CM

## 2019-12-18 DIAGNOSIS — Z13.6 ENCOUNTER FOR SCREENING FOR CARDIOVASCULAR DISORDERS: ICD-10-CM

## 2019-12-18 DIAGNOSIS — R05.9 COUGH: ICD-10-CM

## 2019-12-18 DIAGNOSIS — K80.20 CALCULUS OF GALLBLADDER WITHOUT CHOLECYSTITIS WITHOUT OBSTRUCTION: ICD-10-CM

## 2019-12-18 DIAGNOSIS — K80.50 CHOLEDOCHOLITHIASIS: Primary | ICD-10-CM

## 2019-12-18 PROCEDURE — 99999 PR PBB SHADOW E&M-EST. PATIENT-LVL III: CPT | Mod: PBBFAC,,, | Performed by: FAMILY MEDICINE

## 2019-12-18 PROCEDURE — 99213 OFFICE O/P EST LOW 20 MIN: CPT | Mod: PBBFAC,PO | Performed by: FAMILY MEDICINE

## 2019-12-18 PROCEDURE — 99214 OFFICE O/P EST MOD 30 MIN: CPT | Mod: S$PBB,,, | Performed by: FAMILY MEDICINE

## 2019-12-18 PROCEDURE — 99214 PR OFFICE/OUTPT VISIT, EST, LEVL IV, 30-39 MIN: ICD-10-PCS | Mod: S$PBB,,, | Performed by: FAMILY MEDICINE

## 2019-12-18 PROCEDURE — 99999 PR PBB SHADOW E&M-EST. PATIENT-LVL III: ICD-10-PCS | Mod: PBBFAC,,, | Performed by: FAMILY MEDICINE

## 2019-12-18 RX ORDER — AZITHROMYCIN 250 MG/1
TABLET, FILM COATED ORAL
Qty: 6 TABLET | Refills: 0 | Status: SHIPPED | OUTPATIENT
Start: 2019-12-18 | End: 2019-12-23

## 2019-12-18 RX ORDER — BENZONATATE 200 MG/1
200 CAPSULE ORAL 3 TIMES DAILY PRN
Qty: 30 CAPSULE | Refills: 0 | Status: SHIPPED | OUTPATIENT
Start: 2019-12-18 | End: 2019-12-28

## 2019-12-18 NOTE — PROGRESS NOTES
Subjective:       Patient ID: Alisson Eldridge is a 49 y.o. female.    Chief Complaint: cyst on liver    HPI   Patient presents to review abdominal ultrasound results which were ordered by back and spine secondary to likely hepatic cyst incidentally found on thoracic spine MRI on 10/28/2019 but tells me she has also sick today and avoided going to the ER last night she had an appointment today.    Her abdominal ultrasound performed on 11/21/2019 as below showed two well-delineated anechoic cysts measuring 5.4 and 3.7 cm.  Incidentally it also showed multiple gallstones with the gallbladder wall at top normal in thickness measuring 0.30 cm.  There was no pericholecystic fluid present and she had a negative sonographic Aguilar's sign.  The common bile duct was also top-normal in size measuring 0.76 cm and there was a small echogenic focus within the common bile duct measures 0.3 cm that was suspicious for choledocholithiasis.  She tells me she has been sick for approximately the last 16 days with what she thought was a cold with fairly mild upper respiratory symptoms including nasal congestion, postnasal drip, sore throat, sinus pressure and on off ear pressure.  She tells me on Monday all of her symptoms significantly worsened and she began to feel subjectively feverish after having chills.  Later that day she began have sinus pain below the eyes and she began to have a productive cough although she tells me she thinks this was just secondary to increased postnasal drip.  She tells me which she has been coughing up and blowing out has been thick and greenish and foul smelling.  She has been taking Tylenol for fever, sinus pain and frontal headache that has been sporadic and this has been helping somewhat.  She has been taking Mucinex, Allegra, nasal saline washes and a cold and cough formula all with no improvement in symptoms.  She is requesting something for her cough which has been keeping her up at night.  She  has no other concerns or complaints today.            EXAMINATION:  US ABDOMEN COMPLETE    CLINICAL HISTORY:  Possible hepatic cyst seen on recent thoracic MRI; Other specified diseases of liver    TECHNIQUE:  Complete abdominal ultrasound (including pancreas, aorta, liver, gallbladder, common bile duct, IVC, kidneys, and spleen) was performed.    COMPARISON:  MRI 11/05/2019.    FINDINGS:  The liver is normal in size and echogenicity measuring 15.5 cm.  The portal vein is identified and patent demonstrating normal flow by color and power Doppler.  No perihepatic fluid.  Well-delineated anechoic cysts measuring 5.4 and 3.7 cm.    The gallbladder is normally distended with multiple dependent echogenic gallstones.  The gallbladder wall is top normal in thickness measuring 0.30 cm.  No pericholecystic fluid.  Negative sonographic Aguilar's sign.  Common bile duct is top-normal in size measuring 0.76 cm.  Small echogenic focus within the common bile duct measures 0.3 cm.  This is suspicious for choledocholithiasis.    The visualized pancreas is normal in size and echogenicity.    Kidneys are normal in size and echogenicity measuring 10.4 cm on the right and 9.9 cm on left.  Both kidneys demonstrate normal flow by color Doppler.  No changes of hydronephrosis.    The spleen is normal in size and echogenicity measuring 8.1 cm.    Abdominal aorta and IVC identified.    No ascites identified.      Impression       1. Well-delineated hepatic cysts.  2. Cholelithiasis with suspected choledocholithiasis without definitive sonographic evidence for acute cholecystitis.  As deemed clinically necessary, further evaluation may be obtained with a nuclear medicine HIDA scan.  3. No ascites.         Review of Systems   Constitutional: Negative for activity change, appetite change, fatigue and unexpected weight change.   HENT: Positive for congestion, postnasal drip, sinus pressure and sinus pain. Negative for dental problem, ear  discharge, ear pain (No pressure currently.), facial swelling, hearing loss, rhinorrhea, sneezing, sore throat (This morning, resolved with drinking hot fluids and use of lozenges), trouble swallowing and voice change.    Eyes: Negative for photophobia, pain, discharge, redness, itching and visual disturbance.   Respiratory: Negative for cough, chest tightness, shortness of breath and wheezing.    Cardiovascular: Negative for chest pain, palpitations and leg swelling.   Gastrointestinal: Negative for abdominal pain, blood in stool, constipation, diarrhea, nausea and vomiting.   Genitourinary: Negative for difficulty urinating, dysuria, flank pain, frequency, hematuria, pelvic pain and urgency.   Musculoskeletal: Positive for arthralgias, back pain and myalgias. Negative for gait problem, joint swelling, neck pain and neck stiffness.        Chronic pains at their baseline.   Skin: Negative for rash and wound.   Neurological: Positive for headaches. Negative for dizziness, tremors, syncope, weakness and light-headedness.   Hematological: Negative for adenopathy. Does not bruise/bleed easily.   Psychiatric/Behavioral: Positive for sleep disturbance. Negative for dysphoric mood. The patient is not nervous/anxious.          Objective:      Vitals:    12/18/19 0956   BP: 100/80   Pulse: 75   Temp: 98.2 °F (36.8 °C)   TempSrc: Oral   Weight: 50.9 kg (112 lb 3.4 oz)   Height: 5' (1.524 m)   PainSc: 0-No pain     Physical Exam   Constitutional: She is oriented to person, place, and time. She appears well-developed and well-nourished. No distress.   HENT:   Head: Normocephalic and atraumatic.   Right Ear: Hearing, tympanic membrane, external ear and ear canal normal.   Left Ear: Hearing, tympanic membrane, external ear and ear canal normal.   Nose: Mucosal edema and rhinorrhea present. Right sinus exhibits maxillary sinus tenderness. Right sinus exhibits no frontal sinus tenderness. Left sinus exhibits maxillary sinus  tenderness. Left sinus exhibits no frontal sinus tenderness.   Mouth/Throat: Uvula is midline and mucous membranes are normal. No oral lesions. No trismus in the jaw. No uvula swelling. Oropharyngeal exudate (Copious postnasal drip.) present. No posterior oropharyngeal edema, posterior oropharyngeal erythema or tonsillar abscesses.   Eyes: Pupils are equal, round, and reactive to light. Conjunctivae and EOM are normal. Right eye exhibits no discharge. Left eye exhibits no discharge. No scleral icterus.   Neck: Normal range of motion. Neck supple. No JVD present. No tracheal deviation present. No thyromegaly present.   Cardiovascular: Normal rate, regular rhythm and normal heart sounds. Exam reveals no gallop and no friction rub.   No murmur heard.  Pulmonary/Chest: Effort normal and breath sounds normal. No respiratory distress. She has no wheezes. She exhibits no tenderness.   Abdominal: Soft. Normal appearance and bowel sounds are normal. She exhibits no distension, no pulsatile liver, no abdominal bruit, no ascites, no pulsatile midline mass and no mass. There is no hepatosplenomegaly. There is no tenderness. There is no rigidity, no rebound, no guarding, no CVA tenderness and negative Aguilar's sign. No hernia.   Musculoskeletal: Normal range of motion. She exhibits no edema, tenderness or deformity.   Lymphadenopathy:     She has no cervical adenopathy.   Neurological: She is alert and oriented to person, place, and time.   Skin: Skin is warm and dry. She is not diaphoretic.   Psychiatric: She has a normal mood and affect. Her behavior is normal. Judgment and thought content normal.   Nursing note and vitals reviewed.        Assessment:       1. Choledocholithiasis    2. Calculus of gallbladder without cholecystitis without obstruction    3. Hepatic cyst    4. Encounter for screening for cardiovascular disorders    5. Acute recurrent maxillary sinusitis    6. Cough          Plan:    Choledocholithiasis    Calculus of gallbladder without cholecystitis without obstruction    Hepatic cyst    Encounter for screening for cardiovascular disorders  -     Lipid panel; Future; Expected date: 12/18/2019    Acute recurrent maxillary sinusitis  -     azithromycin (Z-CHUCKY) 250 MG tablet; Take 2 tablets by mouth on day 1; Take 1 tablet by mouth on days 2-5  Dispense: 6 tablet; Refill: 0    Cough  -     benzonatate (TESSALON) 200 MG capsule; Take 1 capsule (200 mg total) by mouth 3 (three) times daily as needed.  Dispense: 30 capsule; Refill: 0      Follow up in about 4 months (around 4/4/2020), or if symptoms worsen or fail to improve.    Alisson appears to be doing poorly today with what I suspect was a viral URI with cough that has developed into an acute recurrent maxillary sinusitis.  Discussing this with her she agrees with this and tells me her current symptoms feel exactly like when she had past maxillary sinus infections.  She requested a Z-Chucky today and tells me she has always done well on these for sinus infection without any adverse effects.  I discussed the risks and benefits and she was interested in this.  She has usually had good results with use of Mucinex or tell some for cough in the past but Mucinex recently has not worked well for her.  She advised me she would try Delsym but wanted a prescription cough relief option she could take at night.  I did discuss the risks and benefits of Tessalon Perles with her and she was very interested in trying this if Delsym that she will get at the pharmacy today does not work.  As long as symptoms improve and resolve over the next 7-10 days she did not want any follow-up for this and wanted to be seen back at her routine 6 month follow-up visit with me in about 4 months.  I did discuss her abdominal ultrasound results with her and this showed several benign-appearing hepatic cysts, gallstones and possibly choledocholithiasis.  As she has no abdominal  pains even with eating fatty meals she had no interest in further workup with a HIDA scan or elective cholecystectomy.  She advised me she would alert me if she has any symptoms of gallbladder disease in the future.  She has not schedule her colonoscopy and advised me she was waiting until after the 1st of the year to schedule this.  I advised her to schedule this as soon as possible.  I once again discussed health maintenance issues with her and she still has no interest in any vaccines at this time.

## 2019-12-19 ENCOUNTER — TELEPHONE (OUTPATIENT)
Dept: FAMILY MEDICINE | Facility: CLINIC | Age: 49
End: 2019-12-19

## 2019-12-19 NOTE — TELEPHONE ENCOUNTER
----- Message from Deisy Martino sent at 12/18/2019  4:14 PM CST -----  Contact: Patient  Type: Needs Medical Advice    Who Called:  Patient  Best Call Back Number: [phone] (home)   Additional Information: The patient said she needs the Dr to send her a Rx that her Insurance will pay for the one that was sent was to expensive it's the   RX Tessalon please send a substitute and call her to advise so that she can go pick it up

## 2019-12-19 NOTE — TELEPHONE ENCOUNTER
----- Message from Kenzie Castillo sent at 12/19/2019 12:15 PM CST -----  Contact: CORINNE LOPEZ  Type: Patient Call Back    Who called:CORINNE LOPEZ    What is the request in detail: Patient is requesting a call back. She states that her left arm to elbow is cold. She states that is always cold and she would like to discuss. She states that it is kind of strange and she forgot to mention it on yesterday.   Please contact to further advise.    Can the clinic reply by [facility]? No    Best call back number: [phone]    Additional Information: N/A

## 2020-01-02 ENCOUNTER — TELEPHONE (OUTPATIENT)
Dept: FAMILY MEDICINE | Facility: CLINIC | Age: 50
End: 2020-01-02

## 2020-01-02 NOTE — TELEPHONE ENCOUNTER
----- Message from Ramon Arana sent at 1/2/2020  1:45 PM CST -----  Type: Needs Medical Advice    Who Called:  Patient  Symptoms (please be specific):  Left arm, knot near elbow-painful to the touch  How long has patient had these symptoms:  4 days  Pharmacy name and phone #:      [id number] - [place], [place] - [address] AT [facility]  & [facility] [place]  [address] [facility] [address]  Phone: [phone] Fax: [phone]      Best Call Back Number: [phone]  Additional Information: Please call to advise

## 2020-01-02 NOTE — TELEPHONE ENCOUNTER
Spoke with patient.  Monday size of quarter,  Now down to size of dime.  Painful since Monday.  No drainage, warm.  Scheduled appointment for tomorrow

## 2020-01-03 ENCOUNTER — OFFICE VISIT (OUTPATIENT)
Dept: FAMILY MEDICINE | Facility: CLINIC | Age: 50
End: 2020-01-03
Payer: MEDICARE

## 2020-01-03 VITALS
HEART RATE: 57 BPM | TEMPERATURE: 98 F | HEIGHT: 60 IN | BODY MASS INDEX: 21.99 KG/M2 | DIASTOLIC BLOOD PRESSURE: 72 MMHG | SYSTOLIC BLOOD PRESSURE: 112 MMHG | WEIGHT: 112 LBS

## 2020-01-03 DIAGNOSIS — R22.31 SUBCUTANEOUS MASS OF RIGHT FOREARM: Primary | ICD-10-CM

## 2020-01-03 PROCEDURE — 99213 OFFICE O/P EST LOW 20 MIN: CPT | Mod: S$PBB,,, | Performed by: FAMILY MEDICINE

## 2020-01-03 PROCEDURE — 99999 PR PBB SHADOW E&M-EST. PATIENT-LVL III: CPT | Mod: PBBFAC,,, | Performed by: FAMILY MEDICINE

## 2020-01-03 PROCEDURE — 99999 PR PBB SHADOW E&M-EST. PATIENT-LVL III: ICD-10-PCS | Mod: PBBFAC,,, | Performed by: FAMILY MEDICINE

## 2020-01-03 PROCEDURE — 99213 OFFICE O/P EST LOW 20 MIN: CPT | Mod: PBBFAC,PO | Performed by: FAMILY MEDICINE

## 2020-01-03 PROCEDURE — 99213 PR OFFICE/OUTPT VISIT, EST, LEVL III, 20-29 MIN: ICD-10-PCS | Mod: S$PBB,,, | Performed by: FAMILY MEDICINE

## 2020-01-03 NOTE — PROGRESS NOTES
Subjective:       Patient ID: Alisson Eldridge is a 49 y.o. female.    Chief Complaint: Lump on right arm    HPI   Patient presents with her  for evaluation of a soft lump on the ventral surface of her right forearm that she noticed 3 days ago the morning after her dog jumped on the area and she had some achy pain. She tells me the lump was much larger initially and has been quickly shrinking.  Achy pain was moderate initially but this quickly improved and resolved over night.  She had no bruising, rash or any other skin changes other than a small red hima from her dogs claw initially that has resolved.  She denies any other associated symptoms and has not done anything for her symptoms.  She tells me she is doing good today and has no other concerns or complaints.    Review of Systems   Constitutional: Negative for activity change, appetite change, fatigue and unexpected weight change.   Respiratory: Negative for cough, chest tightness, shortness of breath and wheezing.    Cardiovascular: Negative for chest pain, palpitations and leg swelling.   Gastrointestinal: Negative for abdominal pain, blood in stool, constipation, nausea and vomiting.   Genitourinary: Negative for difficulty urinating.   Musculoskeletal: Negative for arthralgias.   Skin: Negative for rash and wound.   Hematological: Negative for adenopathy. Does not bruise/bleed easily.   Psychiatric/Behavioral: Negative for dysphoric mood. The patient is not nervous/anxious.          Objective:      Vitals:    01/03/20 0840   BP: 112/72   Pulse: (!) 57   Temp: 97.5 °F (36.4 °C)   TempSrc: Oral   Weight: 50.8 kg (111 lb 15.9 oz)   Height: 5' (1.524 m)   PainSc:   6   PainLoc: Arm     Physical Exam   Constitutional: She is oriented to person, place, and time. She appears well-developed and well-nourished. No distress.   HENT:   Head: Normocephalic and atraumatic.   Cardiovascular: Normal rate, regular rhythm and normal heart sounds. Exam reveals no  gallop and no friction rub.   No murmur heard.  Pulmonary/Chest: Effort normal and breath sounds normal. No respiratory distress. She has no wheezes. She exhibits no tenderness.   Musculoskeletal: Normal range of motion. She exhibits no edema, tenderness or deformity.   Neurological: She is alert and oriented to person, place, and time.   Skin: Skin is warm and dry. Lesion noted. No abrasion, no bruising, no ecchymosis, no laceration and no rash noted. She is not diaphoretic. No erythema.        Examination of the right ventral forearm shows an area circled by a pen line.  No masses or any other abnormalities is detected in this area and she tells me she thinks the lump as move slightly distal to the area as she can palpate a mass there.  There is a subcutaneous soft and freely mobile mass just distal to the circled area with well-circumscribed borders measuring approximately 0.7 x 0.5 cm.  She has no abrasion, bruising, laceration, sinus tracts or any other abnormalities in the area.  Radial pulses are intact and she has full active range of motion, 5/5 strength, sensation intact and no pain to palpation of the right upper extremity.   Psychiatric: She has a normal mood and affect. Her behavior is normal. Judgment and thought content normal.   Nursing note and vitals reviewed.        Assessment:       1. Subcutaneous mass of right forearm          Plan:   Subcutaneous mass of right forearm      Follow up if symptoms worsen or fail to improve.    Alisson appears to be stable and doing well today with exception of a right forearm subcutaneous mass that was noticed after mild trauma by her dog.  It is unknown if the subcutaneous mass examined today is actually the mass she had previously as the masses outside of the circled area.  I think her previous swelling may have been secondary to contusion or possibly even hematoma although I find no evidence of this on examination.  Her current mass appears to be a small cystic  structure without any concerning findings for malignancy and advised her only to continue to monitor the mass and to alert me if there are any changes as pains have completely resolved on their own.  I did discuss concerning findings including fixation and the mass becoming hard which she will watch for.  She did have a multitude of questions about treatment options for this that she had looked up on her own and advised her that she could try warm compresses on the area but I am highly doubtful that this is an abscess.  We did discuss signs and symptoms of abscess and she will alert me if any of these occur.  I also advised her she could try ice on the area if she has any further swelling or pain but she will alert me if these return.  As long she continues to do well without any other problems I will see her back at her routine 4 month follow-up visit in April.  Sooner if any problems.    Alisson was seen for a 20 min visit and greater than half this time was spent counseling on the above.

## 2020-02-19 ENCOUNTER — TELEPHONE (OUTPATIENT)
Dept: FAMILY MEDICINE | Facility: CLINIC | Age: 50
End: 2020-02-19

## 2020-02-19 NOTE — TELEPHONE ENCOUNTER
----- Message from Ryan Khanna sent at 2/19/2020 11:33 AM CST -----  Contact: Ptnt [phone]  Type: Needs Medical Advice    Who Called:  Ptnt [phone]    Additional Information: SARAHI    Advised has a new pain management Dr. Dr. Pedrito Rutledge, [address], [place] [address] [phone],  Fax  [phone]  [email or web address]

## 2020-02-22 DIAGNOSIS — R05.3 CHRONIC COUGH: ICD-10-CM

## 2020-02-22 DIAGNOSIS — R07.0 THROAT DISCOMFORT: ICD-10-CM

## 2020-02-22 DIAGNOSIS — K21.9 LPRD (LARYNGOPHARYNGEAL REFLUX DISEASE): ICD-10-CM

## 2020-02-27 ENCOUNTER — NURSE TRIAGE (OUTPATIENT)
Dept: ADMINISTRATIVE | Facility: CLINIC | Age: 50
End: 2020-02-27

## 2020-02-27 ENCOUNTER — TELEPHONE (OUTPATIENT)
Dept: FAMILY MEDICINE | Facility: CLINIC | Age: 50
End: 2020-02-27

## 2020-02-27 NOTE — TELEPHONE ENCOUNTER
----- Message from Brittany Mi sent at 2/27/2020  4:09 PM CST -----  Contact: pt   Pt needing a call back regarding her blood pressure    Pt contact # [phone]

## 2020-02-27 NOTE — TELEPHONE ENCOUNTER
"Patient reports she had back injections on Monday and blood pressure has been low. Today blood pressure is 84/56. The patient was advised per protocol and was advised to call back with questions, concerns or worsening conditions. The patient verbalized understanding.     Reason for Disposition   Systolic BP < 90 and NOT dizzy, lightheaded or weak    Additional Information   Negative: Systolic BP < 90 and feeling dizzy, lightheaded, or weak   Negative: Started suddenly after an allergic medicine, an allergic food, or bee sting   Negative: Shock suspected (e.g., cold/pale/clammy skin, too weak to stand, low BP, rapid pulse)   Negative: Difficult to awaken or acting confused  (e.g., disoriented, slurred speech)   Negative: Fainted   Negative: Chest pain   Negative: Bleeding (e.g., vomiting blood, rectal bleeding or tarry stools, severe vaginal bleeding)   Negative: Extra heart beats or heart is beating fast  (i.e., "palpitations")   Negative: Sounds like a life-threatening emergency to the triager   Negative: Systolic BP < 80 and NOT dizzy, lightheaded or weak (feels normal)   Negative: Abdominal pain   Negative: Major surgery in the past month   Negative: Fever > 100.5 F (38.1 C)   Negative: Drinking very little and has signs of dehydration (e.g., no urine > 12 hours, very dry mouth, very lightheaded)   Negative: Fall in systolic BP > 20 mm Hg from normal and feeling dizzy, lightheaded, or weak   Negative: Patient sounds very sick or weak to the triager    Protocols used: LOW BLOOD PRESSURE-A-OH      "

## 2020-02-27 NOTE — TELEPHONE ENCOUNTER
Pt has external pain provider, Dr. Rutledge. Pt had injections Monday. Requested that she have note sent to department for review. BP right now is 91/61, 78 heart rate. Had dizziness yesterday, but not today. Pt says had fever since then , area injected not red, warm or swollen. Pt calling Dr. Rutledge's office to report after our call. Please advise.

## 2020-02-27 NOTE — TELEPHONE ENCOUNTER
Attempted to contact patient, LM to call back. After reviewing patient's telephone encounter from earlier today, left detailed message on recorder advising ER if patient starts developing fatigue, blurry vision, SOB or chest pain.

## 2020-03-02 NOTE — TELEPHONE ENCOUNTER
LM for patient to check and see how she is feeling today but no answer instructed patient to call clinic back if she is still feeling bad

## 2020-03-04 ENCOUNTER — TELEPHONE (OUTPATIENT)
Dept: FAMILY MEDICINE | Facility: CLINIC | Age: 50
End: 2020-03-04

## 2020-03-04 NOTE — TELEPHONE ENCOUNTER
----- Message from Jocy Chandler sent at 3/4/2020  2:53 PM CST -----  Type: Needs Medical Advice    Who Called:  Self Symptoms (please be specific):  Stomach pain, nausea  How long has patient had these symptoms:   Pharmacy name and phone #:   Best Call Back Number: [phone]   Additional Information: Patient states her blood pressure is normal, pt still have nausea and stomach pain.

## 2020-03-04 NOTE — TELEPHONE ENCOUNTER
Called pt regarding message about stomach pains and nausea. Pt stated she's been having abdominal pains since Mon. 3/2/20 and that on Tues. 3/3/20 she actually vomit. Pt stated when she eats it hurts and also just laying down hurts. Advised pt if sx worsen to report to ER or Urgent care. Pt verbalized understanding.       Could we prescribe something for her nausea? And would you need me to schedule her an appointment for evaluation?

## 2020-03-05 NOTE — TELEPHONE ENCOUNTER
Called patient per Dr. Sung patient stated that she did not sleep last night and is trying to get some sleep. She will call back tomorrow or Monday to set up an appointment.

## 2020-03-06 ENCOUNTER — TELEPHONE (OUTPATIENT)
Dept: FAMILY MEDICINE | Facility: CLINIC | Age: 50
End: 2020-03-06

## 2020-03-06 NOTE — TELEPHONE ENCOUNTER
Called pt regarding stomach pain and nausea. Pt stated it cleared over night, she feels better. Verbalized understanding, advised her to call if any other concerns don't hesitate to give us a call. Pt verbalized understanding.

## 2020-03-06 NOTE — TELEPHONE ENCOUNTER
----- Message from Lizbeth Maurice sent at 3/6/2020 11:09 AM CST -----  Contact: Patient  Type: Needs Medical Advice    Who Called:  Patient  Best Call Back Number: [phone]  Additional Information: Patient is calling to inform the office that her abdominal pain and nausea has subsided.Thanks!

## 2020-03-24 ENCOUNTER — TELEPHONE (OUTPATIENT)
Dept: FAMILY MEDICINE | Facility: CLINIC | Age: 50
End: 2020-03-24

## 2020-03-24 NOTE — TELEPHONE ENCOUNTER
----- Message from Jocy Chandler sent at 3/24/2020  2:12 PM CDT -----  Type: Needs Medical Advice    Who Called: self   Symptoms (please be specific):    How long has patient had these symptoms:  14 days ago  Pharmacy name and phone #:    Best Call Back Number: [phone]  Additional Information: Patient have bug bites all over her body. The patient notice the bugs bites on her ankle, now the bites is all over her body

## 2020-03-24 NOTE — TELEPHONE ENCOUNTER
"Patient stated that she has bug bites all over her body 30 bites from head to toe. Does have animals (dogs). Noticed them about 14 days ago on her ankles. They dont have heads on them and some are full with liquid, "they burn at first and than they itch like crazy". Patient stated that she looked for bed bugs and she didn't see them anywhere on her bed. Please advise patient stated that she does not have a computer or a smart phone to set up virtual visits.  "

## 2020-03-24 NOTE — TELEPHONE ENCOUNTER
Patient is low on gas and can not come in for an appointment. Patient will call clinic back when she has the gas.

## 2020-05-04 DIAGNOSIS — Z12.11 COLON CANCER SCREENING: ICD-10-CM

## 2020-05-15 ENCOUNTER — OFFICE VISIT (OUTPATIENT)
Dept: PRIMARY CARE CLINIC | Facility: CLINIC | Age: 50
End: 2020-05-15
Payer: MEDICARE

## 2020-05-15 VITALS
DIASTOLIC BLOOD PRESSURE: 72 MMHG | SYSTOLIC BLOOD PRESSURE: 128 MMHG | OXYGEN SATURATION: 98 % | HEART RATE: 71 BPM | TEMPERATURE: 99 F | RESPIRATION RATE: 20 BRPM

## 2020-05-15 DIAGNOSIS — J01.90 ACUTE SINUSITIS, RECURRENCE NOT SPECIFIED, UNSPECIFIED LOCATION: ICD-10-CM

## 2020-05-15 DIAGNOSIS — Z20.822 SUSPECTED COVID-19 VIRUS INFECTION: Primary | ICD-10-CM

## 2020-05-15 PROCEDURE — 99214 OFFICE O/P EST MOD 30 MIN: CPT | Mod: S$GLB,,, | Performed by: EMERGENCY MEDICINE

## 2020-05-15 PROCEDURE — 99214 PR OFFICE/OUTPT VISIT, EST, LEVL IV, 30-39 MIN: ICD-10-PCS | Mod: S$GLB,,, | Performed by: EMERGENCY MEDICINE

## 2020-05-15 PROCEDURE — U0003 INFECTIOUS AGENT DETECTION BY NUCLEIC ACID (DNA OR RNA); SEVERE ACUTE RESPIRATORY SYNDROME CORONAVIRUS 2 (SARS-COV-2) (CORONAVIRUS DISEASE [COVID-19]), AMPLIFIED PROBE TECHNIQUE, MAKING USE OF HIGH THROUGHPUT TECHNOLOGIES AS DESCRIBED BY CMS-2020-01-R: HCPCS

## 2020-05-15 RX ORDER — CETIRIZINE HYDROCHLORIDE 10 MG/1
10 TABLET ORAL DAILY
Qty: 30 TABLET | Refills: 0 | Status: SHIPPED | OUTPATIENT
Start: 2020-05-15 | End: 2020-05-16

## 2020-05-15 RX ORDER — IBUPROFEN 600 MG/1
600 TABLET ORAL EVERY 8 HOURS PRN
Qty: 20 TABLET | Refills: 0 | Status: SHIPPED | OUTPATIENT
Start: 2020-05-15 | End: 2020-05-16

## 2020-05-15 RX ORDER — FLUTICASONE PROPIONATE 50 MCG
1 SPRAY, SUSPENSION (ML) NASAL 2 TIMES DAILY PRN
Qty: 16 G | Refills: 0 | Status: SHIPPED | OUTPATIENT
Start: 2020-05-15 | End: 2020-05-16

## 2020-05-15 NOTE — PATIENT INSTRUCTIONS
Instructions for Patients with Confirmed or Suspected COVID-19    If you are awaiting your test result, you will either be called or it will be released to the patient portal.  If you have any questions about your test, please visit [email or web address] or call our COVID-19 information line at [phone].       Stay home and stay away from family members and friends. The CDC says, you can leave home after these three things have happened: 1) You have had no fever for at least 72 hours (that is three full days of no fever without the use of medicine that reduces fevers) 2) AND other symptoms have improved (for example, when your cough or shortness of breath have improved) 3) AND at least 7 days have passed since your symptoms first appeared.   Separate yourself from other people and animals in your home.   Call ahead before visiting your doctor.   Wear a facemask.   Cover your coughs and sneezes.   Wash your hands often with soap and water; hand  can be used, too.   Avoid sharing personal household items.   Wipe down surfaces used daily.   Monitor your symptoms. Seek prompt medical attention if your illness is worsening (e.g., difficulty breathing).    Before seeking care, call your healthcare provider.   If you have a medical emergency and need to call 911, notify the dispatch personnel that you have, or are being evaluated for COVID-19. If possible, put on a facemask before emergency medical services arrive.        Recommended precautions for household members, intimate partners, and caregivers in a home setting of a patient with symptomatic laboratory-confirmed COVID-19 or a patient under investigation.  Household members, intimate partners, and caregivers in the home setting awaiting tests results have close contact with a person with symptomatic, laboratory-confirmed COVID-19 or a person under investigation. Close contacts should monitor their health; they should call their  provider right away if they develop symptoms suggestive of COVID-19 (e.g., fever, cough, shortness of breath).    Close contacts should also follow these recommendations:   Make sure that you understand and can help the patient follow their provider's instructions for medication(s) and care. You should help the patient with basic needs in the home and provide support for getting groceries, prescriptions, and other personal needs.   Monitor the patient's symptoms. If the patient is getting sicker, call his or her healthcare provider and tell them that the patient has laboratory-confirmed COVID-19. If the patient has a medical emergency and you need to call 911, notify the dispatch personnel that the patient has, or is being evaluated for COVID-19.   Household members should stay in another room or be  from the patient. Household members should use a separate bedroom and bathroom, if available.   Prohibit visitors.   Household members should care for any pets in the home.   Make sure that shared spaces in the home have good air flow, such as by an air conditioner or an opened window, weather permitting.   Perform hand hygiene frequently. Wash your hands often with soap and water for at least 20 seconds or use an alcohol-based hand  (that contains > 60% alcohol) covering all surfaces of your hands and rubbing them together until they feel dry. Soap and water should be used preferentially.   Avoid touching your eyes, nose, and mouth.   The patient should wear a facemask. If the patient is not able to wear a facemask (for example, because it causes trouble breathing), caregivers should wear a mask when they are in the same room as the patient.   Wear a disposable facemask and gloves when you touch or have contact with the patient's blood, stool, or body fluids, such as saliva, sputum, nasal mucus, vomit, urine.  o Throw out disposable facemasks and gloves after using them. Do not  reuse.  o When removing personal protective equipment, first remove and dispose of gloves. Then, immediately clean your hands with soap and water or alcohol-based hand . Next, remove and dispose of facemask, and immediately clean your hands again with soap and water or alcohol-based hand .   You should not share dishes, drinking glasses, cups, eating utensils, towels, bedding, or other items with the patient. After the patient uses these items, you should wash them thoroughly (see below Wash laundry thoroughly).   Clean all high-touch surfaces, such as counters, tabletops, doorknobs, bathroom fixtures, toilets, phones, keyboards, tablets, and bedside tables, every day. Also, clean any surfaces that may have blood, stool, or body fluids on them.   Use a household cleaning spray or wipe, according to the label instructions. Labels contain instructions for safe and effective use of the cleaning product including precautions you should take when applying the product, such as wearing gloves and making sure you have good ventilation during use of the product.   Wash laundry thoroughly.  o Immediately remove and wash clothes or bedding that have blood, stool, or body fluids on them.  o Wear disposable gloves while handling soiled items and keep soiled items away from your body. Clean your hands (with soap and water or an alcohol-based hand ) immediately after removing your gloves.  o Read and follow directions on labels of laundry or clothing items and detergent. In general, using a normal laundry detergent according to washing machine instructions and dry thoroughly using the warmest temperatures recommended on the clothing label.   Place all used disposable gloves, facemasks, and other contaminated items in a lined container before disposing of them with other household waste. Clean your hands (with soap and water or an alcohol-based hand ) immediately after handling these  items. Soap and water should be used preferentially if hands are visibly dirty.   Discuss any additional questions with your state or local health department or healthcare provider. Check available hours when contacting your local health department.    For more information see CDC link below.      https://www.cdc.gov/coronavirus/2019-ncov/hcp/guidance-prevent-spread.html#precautions        Sources:  [facility], [place] [facility] and [facility]      Sinusitis (No Antibiotics)    The sinuses are air-filled spaces within the bones of the face. They connect to the inside of the nose. Sinusitis is an inflammation of the tissue lining the sinus cavity. Sinus inflammation can occur during a cold. It can also be due to allergies to pollens and other particles in the air. It can cause symptoms such as sinus congestion, headache, sore throat, facial swelling and fullness. It may also cause a low-grade fever. No infection is present, and no antibiotic treatment is needed.  Home care  · Drink plenty of water, hot tea, and other liquids. This may help thin mucus. It also may promote sinus drainage.  · Heat may help soothe painful areas of the face. Use a towel soaked in hot water. Or,  the shower and direct the hot spray onto your face. Using a vaporizer along with a menthol rub at night may also help.   · An expectorant containing guaifenesin may help thin the mucus and promote drainage from the sinuses.  · Over-the-counter decongestants may be used unless a similar medicine was prescribed. Nasal sprays work the fastest. Use one that contains phenylephrine or oxymetazoline. First blow the nose gently. Then use the spray. Do not use these medicines more often than directed on the label or symptoms may get worse. You may also use tablets containing pseudoephedrine. Avoid products that combine ingredients, because side effects may be increased. Read labels. You can also ask the pharmacist for help. (NOTE: Persons  with high blood pressure should not use decongestants. They can raise blood pressure.)  · Over-the-counter antihistamines may help if allergies contributed to your sinusitis.    · Use acetaminophen or ibuprofen to control pain, unless another pain medicine was prescribed. (If you have chronic liver or kidney disease or ever had a stomach ulcer, talk with your doctor before using these medicines. Aspirin should never be used in anyone under 18 years of age who is ill with a fever. It may cause severe liver damage.)  · Use nasal rinses or irrigation as instructed by your health care provider.  · Don't smoke. This can worsen symptoms.  Follow-up care  Follow up with your healthcare provider or our staff if you are not improving within the next week.  When to seek medical advice  Call your healthcare provider if any of these occur:  · Green or yellow discharge from the nose or into the throat  · Facial pain or headache becoming more severe  · Stiff neck  · Unusual drowsiness or confusion  · Swelling of the forehead or eyelids  · Vision problems, including blurred or double vision  · Fever of 100.4ºF (38ºC) or higher, or as directed by your healthcare provider  · Seizure  · Breathing problems  · Symptoms not resolving within 10 days  Date Last Reviewed: 4/13/2015  © 1539-0390 [employer]. [address] [place] [address], [place] [address]. All rights reserved. This information is not intended as a substitute for professional medical care. Always follow your healthcare professional's instructions.

## 2020-05-15 NOTE — PROGRESS NOTES
Subjective:        Time seen by provider: 12:39 PM on 05/15/2020    Alisson Eldridge is a 49 y.o. female with PMHx of chronic bronchitis, asthma, and tobacco use who presents for an evaluation of possible COVID-19. Pt c/o sore throat, congestion, nausea, chills, subjective fever, productive cough, HA, and bilateral ear pain over the last 2-3 days, pain making it difficult to sleep. She denies any other symptoms at this time. She has been taking Mucinex, Benadryl and antihistamines with little to no relief. No pertinent PSHx.    Review of Systems   Constitutional: Positive for chills and fever (subjective). Negative for activity change, appetite change and fatigue.   HENT: Positive for congestion, ear pain (bilaterally) and sore throat. Negative for rhinorrhea.    Respiratory: Positive for cough. Negative for chest tightness, shortness of breath and wheezing.    Cardiovascular: Negative for chest pain and palpitations.   Gastrointestinal: Positive for nausea. Negative for diarrhea and vomiting.   Musculoskeletal: Negative for arthralgias and myalgias.   Skin: Negative for rash.   Neurological: Positive for headaches. Negative for weakness, light-headedness and numbness.       Objective:      Physical Exam   Constitutional: She is oriented to person, place, and time. She appears well-developed and well-nourished. No distress.   HENT:   Head: Normocephalic and atraumatic.   Nose: Nose normal.   Mouth/Throat: Oropharynx is clear and moist.   Eyes: Conjunctivae are normal.   Neck: Normal range of motion.   Cardiovascular: Normal rate, regular rhythm, normal heart sounds and intact distal pulses.   No murmur heard.  Pulmonary/Chest: Breath sounds normal. No respiratory distress. She has no wheezes.   Musculoskeletal: Normal range of motion.   Neurological: She is alert and oriented to person, place, and time.   Skin: Skin is warm and dry. She is not diaphoretic.   Nursing note and vitals reviewed.      Assessment:        1. Suspected Covid-19 Virus Infection    2. Acute sinusitis, recurrence not specified, unspecified location        Plan:       1. Suspected Covid-19 Virus Infection  - COVID-19 Routine Screening  2. Acute sinusitis, recurrence not specified, unspecified location  - ibuprofen (ADVIL,MOTRIN) 600 MG tablet; Take 1 tablet (600 mg total) by mouth every 8 (eight) hours as needed for Pain.  Dispense: 20 tablet; Refill: 0  - fluticasone propionate (FLONASE) 50 mcg/actuation nasal spray; 1 spray (50 mcg total) by Each Nostril route 2 (two) times daily as needed for Rhinitis or Allergies.  Dispense: 15 g; Refill: 0  - cetirizine (ZYRTEC) 10 MG tablet; Take 1 tablet (10 mg total) by mouth once daily.  Dispense: 30 tablet; Refill: 0  3. Discharge home and await results.   4. Return to clinic or ED for new or worsening symptoms.   5. Follow-up with PCP as needed.       Scribe Attestation:   I, Jessica Raines, am scribing for, and in the presence of, Catalina Valente PA-C. I performed the above scribed service and the documentation accurately describes the services I performed. I attest to the accuracy of the note.    I, Catalina Valente PA-C, personally performed the services described in this documentation. All medical record entries made by the scribe were at my direction and in my presence.  I have reviewed the chart and agree that the record reflects my personal performance and is accurate and complete. Catalina Valente PA-C.  7:30 PM 05/15/2020

## 2020-05-16 LAB — SARS-COV-2 RNA RESP QL NAA+PROBE: NOT DETECTED

## 2020-05-16 RX ORDER — FLUTICASONE PROPIONATE 50 MCG
1 SPRAY, SUSPENSION (ML) NASAL 2 TIMES DAILY PRN
Qty: 15 G | Refills: 0 | Status: SHIPPED | OUTPATIENT
Start: 2020-05-16 | End: 2020-11-20 | Stop reason: SDUPTHER

## 2020-05-16 RX ORDER — IBUPROFEN 600 MG/1
600 TABLET ORAL EVERY 8 HOURS PRN
Qty: 20 TABLET | Refills: 0 | Status: SHIPPED | OUTPATIENT
Start: 2020-05-16 | End: 2020-08-05

## 2020-05-16 RX ORDER — CETIRIZINE HYDROCHLORIDE 10 MG/1
10 TABLET ORAL DAILY
Qty: 30 TABLET | Refills: 0 | Status: SHIPPED | OUTPATIENT
Start: 2020-05-16 | End: 2020-06-04

## 2020-05-27 NOTE — PROGRESS NOTES
Outpatient Care Management  Plan of Care Follow Up Visit    Patient: Alisson Eldridge  MRN: [id number]  Date of Service: 11/15/2019  Completed by: Michael Mansfield RN  Referral Date: 10/04/2019  Program: Case Management (High Risk)    Reason for Visit   Patient presents with    OPCM RN First Follow-Up Attempt     11/15/19    Update Plan Of Care     11/19/19       Brief Summary: 11/19/19 RN-CM received incoming call from patient, returning my call from Friday. Spoke with Ms. Vinson who confirmed review and receipt of all the resources mailed to her. States that she spoke with the SW yesterday and agreed to case closure as all the resources had been provided to her and she would be following up on her own. Stated that she has had a busy couple of weeks. Completed another MRI and an EMG to test for carpal tunnel syndrome and has an appointment with Dr. Long to review results this Thursday. Has not made a decision whether she wants to go forward with any invasive procedures. Again encouraged Ms. Vinson to discuss any concerns, worries with Dr. Long about treatment options; verbalized understanding. We reviewed the information on CHF. Patient denies any questions related to the materials reviewed and was able to correctly articulate signs and symptoms as documented in Care Plan below. States that her weight has been very stable, between 106 and 108 pounds, and does not feel the need to weigh herself every day. Denies any swelling or SOB, but states that if she developed any S/S, she would weigh herself more often. Reports review and understanding of the information on Anemia and the importance of adding more foods high in iron to her diet. Was able to correctly state 4 S/S of anemia. Reports compliance with medications and lab work as ordered by physicians. Denies any recent falls or near falls. States that she has all the information she needs and has a good understanding of the information provided and reviewed  with this RN. States that she will be very busy over the next several weeks with the holidays coming and does not feel that she needs to be followed any longer. States that she will call me if she has any questions or concerns. Will close case today. - RASHIDA Lam, RN-OPCM    Patient Summary     Involvement of Care:  Do I have permission to speak with other family members about your care?   Yes, Travon Russo (S/O)    Patient Reported Labs & Vitals:  1.  Any Patient Reported Labs & Vitals?  Yes  2.  Patient Reported Blood Pressure:     3.  Patient Reported Pulse:     4.  Patient Reported Weight (Kg):  106 lbs  5.  Patient Reported Blood Glucose (mg/dl):       Medical History:  Reviewed medical history with patient and/or caregiver    Social History:  Reviewed social history with patient and/or caregiver    Clinical Assessment     Reviewed and provided basic information on available community resources for mental health, transportation, wellness resources, and palliative care programs with patient and/or caregiver.    Complex Care Plan     Care plan was discussed today with input from patient.    Goals        Outpatient Case Management     Patient/caregiver will have an action plan in place to manage and prevent complications of Anemia prior to discharge from [facility]. - Priority: High      Overall Time to Completion  2 months from 10/17/2019     [facility] Identified Patient Barriers:  Advance Care Planning - Referred to OPC   Nutrition - Referred to OPCM   Communication - Referred to OPCM   Financial - Referred to OPC   Fall Risk - Care Plan Created     [facility] Identified Disease Education Barriers:  Education Barrier For Anemia - Care Plan Created     Short Term Goals  Patient will discuss health care needs with Physician and care team during visits or using Patient Portal within 6 weeks.  Interventions   · Empower patient to discuss treatment plan with Physician/care team. -  Done 10/17/19  · Educate patient on the importance of compliance with Physician follow-ups.  - Done 10/17/19  · Provide contact information for [facility]. - Done 10/17/19 [phone]  · Provide contact information for Physician office.  - Done 10/17/19 [phone]     Status  · Met    Patient will verbalize 3 food items that are high in iron within 3 weeks.   Foods High in Iron   Beef, liver, poultry, fish, eggs*   Dried fruits (especially raisins, prunes, figs)    Legumes such as dried beans and lentils*    Breads and cereals with iron added   Blackstrap molasses   Spinach and other green leafy vegetables*   Foods cooked in cast-iron pans (especially true of acidic foods, such as tomatoes and vickie)    Interventions   · Assess for patient's ability to verbalize at least 3 food items that are high in iron. - Done 11/19/19 (*5 eggs, fish, chicken, beans, green leafy vegetables)  · Educate patient on the importance of Dietary Compliance. - Done 11/19/19  · Recognize and provide educational material (KRATATYANA) on Iron Deficiency Anemia. - Mailed 10/17/19; 10/30/19 Has not yet rec'd information; 11/19/19 Confirmed receipt  · Review food groups/types of food that are high in iron. - Done 11/19/19  · Educate patient on the importance of including foods that are high in iron to her diet. - Done 11/19/19  · Patient agrees to add at least 2 foods that are high in iron to her diet within the next 2-3 weeks. - 10/30/19 Has not yet rec'd information; 11/19/19 States she will add more beans, eggs, fish, chicken and green leafy vegetables to diet (not a big fan of beef or liver)  Patient agrees to OPCM follow up within 10-12 days to assess progress to goal. - 10/30/19 Will reassess at next call; 11/19/19 Goal Met      Status  · Met    Patient will verbalize 3 signs and symptoms of Anemia within 1 month.    Signs and Symptoms of Anemia   Anemia makes you feel tired and run down.*   When anemia becomes severe, your skin  becomes pale.*    You may feel short of breath after physical activity.*    Other symptoms include:   Headaches*   Dizziness   Leg cramps with physical activity   Drowsiness   Restless legs    Interventions   · Assess for patient's ability to verbalize at least 3 signs and symptoms of Anemia. - Done 11/19/19 (*4)  · Recognize and provide educational material (KRAMES) on Iron Deficiency Anemia. - Mailed 10/17/19  · Review signs and symptoms of Anemia with patient. - Done 10/17/19  Patient agrees to review the educational information on Anemia within the next 2-3 weeks. - 10/30/19 Has not yet rec'd information; 11/19/19 Confirmed receipt   Patient agrees to OPCM follow up within 10-12 days to assess progress to goal. - 10/30/19 Will reassess at next call; 11/19/19 Goal Met      Status  · Met    Patient will verbalize 2 guidelines when caring for yourself at home with Anemia within 1 month.   Guidelines When Caring for Yourself at Home   Eat foods high in iron.* This will boost the amount of iron stored in your body. It is a natural way to build up the number of blood cells.    Do not overexert yourself.*   Talk with your healthcare provider before traveling by air or traveling to high altitudes.   Follow up with your healthcare provider in 2 months, or as advised.    Comply with laboratory testing as ordered by physician to be sure your anemia has been fixed.   Call your healthcare provider right away if any of these occur:   Shortness of breath or chest pain   Dizziness or fainting   Vomiting blood or passing red or black-colored stool     Interventions   · Assess for patient's ability to verbalize at least 2 guidelines when caring for yourself at home with Anemia. - Done 11/19/19 (*2)  · Empower patient to discuss treatment plan with Physician/care team. - Done 10/17/19  · Educate patient on the importance of compliance with scheduled laboratory testing and procedure. - Done 10/17/19; 11/19/19  · Educate patient on  the importance of compliance with Dietary Compliance. - Done 10/17/19; 11/19/19  · Educate patient on the importance of compliance with Medication Compliance. - Done 10/17/19  · Recognize and provide educational material (KRAMES) on Iron Deficiency Anemia. - Mailed 10/17/19  · Review with patient guidelines when caring for yourself at home with Anemia. - Done 11/19/19  Patient agrees to review the educational information on Anemia within the next 2-3 weeks. - 10/30/19 Has not yet rec'd information; 11/19/19 Confirmed receipt and review of information  Patient agrees to OPC follow up within 10-12 days to assess progress to goal. - 10/30/19 Will reassess at next call; 11/19/19 Goal Met      Status  · Met                   Patient/caregiver will have an action plan in place to manage and prevent complications of CHF prior to discharge from OPCM - Priority: High      Overall Time to Completion  2 months from 10/17/2019     OPCM Identified Patient Barriers:  Advance Care Planning - Referred to OPCM   Nutrition - Referred to OPCM   Communication - Referred to OPCM   Financial - Referred to OPCM   Fall Risk - Care Plan Created     OPCM Identified Disease Education Barriers:  Education Barrier For Anemia - Care Plan Created  Education Barrier for CHF - Care Plan Created    Short Term Goals  Patient will measure and record the weight 1 times per day for 3 weeks.  Interventions   · Assess for availability of working scale in home setting. - Done 10/17/19 (Yes)  · Mail Weight log for home use. - Mailed 10/17/19; 10/28/19 Has not yet rec'd; 11/19/19 Confirmed receipt  · Recognize and provide educational material (KRAMES) on CHF/CHF Flare. - Mailed 10/17/19; 10/30/19 Has not yet rec'd information; 11/19/19 Confirmed receipt and review of information  · Assess for compliance with daily measuring and logging of weight. - 10/17/19 Not weighing at all; 10/30/19 Reports last  weight of 107 lbs a couple of days ago  · Review weight log with patient at follow up calls. - 11/19/19 Patient reports weighing every few days, fluctuating between 106-108 pounds  Patient agrees to start to weigh and log daily for the next 3 weeks. - 10/30/19 Has not started weighing daily; 11/19/19 Weighing every few days; reports stable weight over last few weeks  · Patient agrees to OPCM follow up within 10-12 days to assess progress to goal. - 10/30/19 Not in compliance; will reassess at next call; 11/19/19 Improving      Status  · Partially met - Agrees to weigh every 2-3 days or if notices any swelling in lower extremities or hands    Patient will verbalize importance of notifying doctor if gains more than 2 pounds in one day or 5 or more pounds in one week within 5 weeks.  Interventions   · Assess for patient's ability to verbalize importance of MD notification for sudden weight gain. - Done 11/19/19   · Empower patient to discuss her history of CHF with current  Physician/care team. - Done 10/17/19  · Educate patient on the importance of compliance with Physician follow-ups. - Done 10/17/19  · Educate patient on the importance of Dietary Compliance (low sodium) to help prevent fluid retention. - Done 10/17/19; 11/19/19  · Educate patient on the importance of Medication Compliance. - Done 10/17/19  · Recognize and provide educational material (KRAMES) on CHF/CHF Flare. - Mailed 10/17/19; 10/30/19 Has not yet rec'd information; 11/19/19 Confirmed receipt and review of information  Patient agrees to review the educational literature on CHF/CHF Flare within the next 3-4 weeks.  - 10/30/19 Has not yet rec'd informatio; 11/19/19 Confirmed receipt and review of information  · Patient agrees to OPCM follow up within 10-12 days to assess progress to goal. - 10/30/19 Will reassess at next call; 11/19/19 Improving      Status  · Partially met - Weighing every 2-3 days     Patient will verbalize 2 signs and symptoms of  Congestive Heart Failure/CHF Flare within 2 months.    Signs and Symptoms of CHF/CHF Flare   Your feet, ankles, or lower legs get puffier.*   You notice skin changes on your lower legs.   Your shoes feel too tight.   Your clothes are tighter in the waist.   You have trouble getting rings on or off your fingers.*   You have to breathe harder even when youre doing your normal activities or when youre resting.*   You are short of breath walking upstairs or even short distances.   You wake up at night short of breath or coughing.   You need to use more pillows or sit up to sleep.   You wake up tired or restless.   You feel weaker, dizzy, or more tired.   You have chest pain or changes in your heartbeat.   You have a cough that wont go away.   You cant remember things or dont feel like eating.    Interventions   · Assess for patient's ability to verbalize at least 2 signs and symptoms of CHF/CHF Flare. - 11/19/19 (*3)  · Recognize and provide educational material (KB) on CHF/CHF Flare. - Mailed 10/17/19  · Review signs and symptoms of CHF/CHF Flare with patient. - Done 10/30/19  Patient agrees to review the educational literature on CHF/CHF Flare within the next 3-4 weeks. - 10/30/19 Has not yet rec'd information; 11/19/19 Confirmed receipt and review of information  · Patient agrees to OPCM follow up within 10-12 days to assess progress to goal. - 10/30/19 Will reassess at next call; 11/19/19 Goal Met     Status  · Met    Patient will verbalize 2 ways of preventing complications due to disease process within 2 months.   Ways to Prevent Complications   Check your weight every day; sudden increase in weight gain could mean worsening heart failure.*     Keep these things in mind:  ? Use the same scale every day.  ? Weigh yourself at the same time every day.  ? Make sure the scale is on a hard floor surface, not on a rug or carpet.  ? Keep a record of your weight every day so your healthcare provider can see it. If  you are not given a log sheet for this, keep a separate journal for this purpose.    Cut back on the amount of salt (sodium) you eat.*  ? Avoid high-salt foods. These include olives, pickles, smoked meats, salted potato chips, and most prepared foods.  ? Don't add salt to your food at the table. Use only small amounts of salt when cooking.  ? Read the labels carefully on food packages to learn how much salt or sodium is in each serving in the package. Remember, a can or package of food may contain more than 1 serving. If you eat all the food in the package, you may be getting more salt than you think.  Follow your healthcare provider's recommendations about how much fluid you should have. Be aware that some foods, such as soup, pudding, and juicy fruits like oranges or melons, contain liquid. You'll need to count the liquid in those foods as part of your daily fluid intake.    If you smoke, stop smoking.* 11/19/19 Not ready to quit, but will keep the smoking cessation information   Cut back on how much alcohol you drink.  Lose weight if you are overweight. The excess weight adds a lot of stress on the workload of the heart.  Stay active. Talk with your provider about an exercise program that is safe for your heart.  Keep your feet elevated to reduce swelling. Ask your provider about support hose as a preventive treatment for daytime leg swelling.    Interventions   · Assess for patient's ability to verbalize at least 2 ways of preventing complications due to disease process. - Done 11/19/19 (*3)   · Empower patient to discuss treatment plan with Physician/care team. - Done 10/30/19; 11/19/19  · Educate patient on the importance of compliance with Physician follow-ups. - Done 10/30/19  · Educate patient on the importance of Dietary Compliance (low sodium). - Done 10/30/19; 11/19/19  · Educate patient on the importance of Exercise as tolerated. - Done 10/30/19; 11/19/19  · Educate patient on the importance of  Medication Compliance. - Done 10/30/19; 11/19/19  · Educate patient on the importance of  Smoking Cessation. - Done 10/30/19; 11/19/19 Stated not ready to quit  · Mail patient a copy of [facility] Smoking Cessation brochure. - Mailed 10/17/19; 11/19/19 Rec'd, not ready to quit  · Provide contact information for Coordinator of Tobacco Cessation Community Outreach, Ya Ford ([phone]). - Done 10/30/19  · Recognize and provide educational material (KRATATYANA) on CHF/CHF Flare and Smoking Cessation. - Mailed 10/17/19  · Review ways of preventing complications due to disease process with patient. - Done 10/30/19  · Patient agrees to review the educational literature on CHF/CHF Flare and Smoking Cessation within the next 3-4 weeks. - 10/30/19 Has not yet rec'd information; 11/19/19 Confirmed receipt and review of information  · Patient agrees to OPC follow up within 10-12 days to assess progress to goal. - 10/30/19 Will reassess at next call; 11/19/19 Goal Met     Status  · Met           Patient/caregiver will have Safety Plan in place prior to discharge from OPCM. - Priority: Medium      Overall Time to Completion  2 months from 10/17/2019     OPCM Identified Patient Barriers:  Advance Care Planning - Referred to OPCM   Nutrition - Referred to OPCM   Communication - Referred to OPCM   Financial - Referred to OPCM   Fall Risk - Care Plan Created     OPCM Identified Disease Education Barriers:  Education Barrier For Anemia - Care Plan Created     Short Term Goals  Patient will put in place at least 4 applicable home safety measures to decrease the risk of patient falls within 2 months.  Interventions   · Assess patient's ability to perform ADLs. - Done 10/17/19  · Assess for compliance with at least 4 applicable home safety measures to decrease the risk of patient falls. - 10/30/19 (*10)  · Educate patient on the importance of daily Exercise as tolerated. - Done  10/17/19  · Encourage S/O to help patient perform ADLs according to the patient's capabilities.- Done 10/17/19   · Facilitate needed DME. - 10/17/19 Denies need for DME at this time  · In basket message sent to Physician regarding referral to Optometry for Eye Exam. - Sent message 10/18/19; 10/23/19 Referral to Optometry placed by PCP, Dr. Sung; 10/30/19 Patient states will schedule appt; 11/19/19 Encouraged patient to schedule appt as soon as possible; verbalized understanding.   · Recognize and provide educational material (KRATATYANA) on Fall Prevention. - Mailed 10/17/19   Review recommended Fall Prevention safety measures with patient: 10/30/19  Patient agrees to review Fall Prevention educational resources within the next 2-3 weeks. - 10/30/19 Has not rec'd information yet; 11/19/19 Confirmed receipt and review of information   Patient agrees to OPCM follow up within 10-12 days to assess progress to goal. - 10/30/19 Will assess at next call; 11/19/19 Goal Met  Keep floors and stairs free of clutter and cords.*   Arrange furniture so there are clear pathways.*   Clean up any spills right away.  Install grab bars in the tub or shower. - 10/17/18 Denies need for grab bars.    Apply nonskid strips or put a nonskid rubber mat in the tub or shower.   Sit on a bath chair or tub bench to bathe. - 10/17/18 Denies need for chair or bench.    Use bathmats with nonskid backing.*   Use non-slip pads under rugs. Do not use area rugs or small throw rugs.*   Do not walk in poorly lit areas.*   Keep a flashlight in each room. Or put a lamp next to the bed within easy reach.*   Put nightlights in the bedrooms, hallways, kitchen, and bathrooms.*   Do not stand on chairs or wobbly ladders.*  Use caution when reaching overhead or looking upward. This position can cause a loss of balance.   Move or rearrange items that you use often. This will make them easier to find or reach.   Be sure your shoes fit properly, have non-slip bottoms  and are in good condition.*    Wear shoes both inside and outside. Avoid going barefoot or wearing slippers.  Be cautious when going up and down stairs, curbs, and when walking on uneven sidewalks.   If your balance is poor, consider using a cane or walker. - 10/17/19 Denies need for cane or walker.  If your fall was related to use of sleeping medicines, talk to your doctor. Bedtime dosage may need to be reduced if you awaken during the night to go to the bathroom.     To reduce the need for nighttime bathroom trips:  ? Avoid drinking fluids for several hours before going to bed  ? Empty your bladder before going to bed*  ? Women can utilize a bedside commode  Stay as active as you can. Balance, flexibility, strength, and endurance all come from exercise. They all play a role in preventing falls. Ask your healthcare provider which types of activity are right for you.   Get your vision checked on a regular basis. - 10/17/19 Sent message to PCP for Optometry referral for Eye Exam; 10/28/19 Referral placed by PCP on 10/23/19 (Patient to schedule appointment).   Look at your home to find any safety hazards. Especially look at doorways, walkways, and the driveway. Remove or repair any safety problems that you find. - 10/30/19     Status  · Met                  Patient Instructions     Instructions were provided via the [facility] patient resources and are available for the patient to review.     No follow-ups on file.Case closed.     Todays OPCM Self-Management Care Plan was reviewed with the patients input and was based on identified barriers from todays assessment. Goals were reviewed today with the patient and the patient has agreed to continue to work towards these goals to improve her overall well-being. Patient verbalized understanding of the care plan, goals, and all of today's instructions. Encouraged patient to communicate with her physician and health care team about health conditions and the treatment plan.  Provided my contact information today and encouraged patient to call me with any questions as needed; verbalized understanding. Perry Lam, RN-OPCM

## 2020-05-27 NOTE — PROGRESS NOTES
Outpatient Care Management  Plan of Care Follow Up Visit    Patient: Alisson Eldridge  MRN: [id number]  Date of Service: 10/28/2019  Completed by: Michael Mansfield RN  Referral Date: 10/04/2019  Program: Case Management (High Risk)    Reason for Visit   Patient presents with    OPCM RN First Follow-Up Attempt     10/28/19    Update Plan Of Care     10/30/19     Brief Summary: 10/30/2019 RN-CM contacted patient to follow up for OPCM. Spoke with Alisson who reported that she is doing okay. States that she has not yet received the educational resources mailed to her. States that she has not heard from the SW yet; explained to patient that she should be hearing from her within the next few days; provided contact information for OPCM-SW, Shalini Delarosa, and encouraged patient to contact Shalini if she does not hear from her within the next week; verbalized understanding. Denies any recent falls. We went over information about Fall Prevention. Patient reports compliance with at least 10 of the safety recommendations reviewed. Encouraged patient to due a walk through of the home, utilizing the Fall Prevention resource when she receives it, to identify any safety hazards that may have been overlooked and address them as needed; verbalized understanding. States that she attended her first appointment with Dr. Long (Physical Medicine and Rehab) on Monday and discussed her MRI and treatment options. States that she canceled the procedure she had scheduled with Dr. Best (nerve block, medial branch, lumbar and thoracic) and will reschedule at a later time. States that she is not sure if she wants to proceed with that procedure. Patient reports reluctance with moving forward with any invasive treatments at this time. States that pain is about the same and has not worsened at this point.  Encouraged patient to review treatment options and address any concerns she may have with Dr. Best and/or Dr. Long; verbalized  understanding. Reviewed with patient the referral placed by PCP for Optometry. Patient states that she will schedule the appointment herself; declined need for assistance with scheduling. Reviewed CHF as documented in Care Plan below. Ongoing education and review will be addressed at subsequent follow up calls. Patient is not weighing daily and denies any current S/S of CHF. Deferred review of Anemia information at this time as patient needed to end the call. Requested call back in 2-3 weeks. Will continue to follow. Perry Lam, RN-[facility]    Patient Summary     Involvement of Care:  Do I have permission to speak with other family members about your care?   Yes, Travon Russo (S/O)    Patient Reported Labs & Vitals:  1.  Any Patient Reported Labs & Vitals?  Yes  2.  Patient Reported Blood Pressure:     3.  Patient Reported Pulse:     4.  Patient Reported Weight (Kg):   107 lbs  5.  Patient Reported Blood Glucose (mg/dl):       Medical History:  Reviewed medical history with patient and/or caregiver    Social History:  Reviewed social history with patient and/or caregiver    Clinical Assessment     Reviewed and provided basic information on available community resources for mental health, transportation, wellness resources, and palliative care programs with patient and/or caregiver.    Complex Care Plan     Care plan was discussed and completed today with input from patient and/or caregiver.    Goals        Outpatient Case Management     Patient/caregiver will have an action plan in place to manage and prevent complications of Anemia prior to discharge from [facility]. - Priority: High      Overall Time to Completion  2 months from 10/17/2019     [facility] Identified Patient Barriers:  Advance Care Planning - Referred to OPCM   Nutrition - Referred to OPCM   Communication - Referred to OPCM   Financial - Referred to OPCM   Fall Risk - Care Plan Created     OPC Identified Disease  Education Barriers:  Education Barrier For Anemia - Care Plan Created     Short Term Goals  Patient will discuss health care needs with Physician and care team during visits or using Patient Portal within 6 weeks.  Interventions   · Empower patient to discuss treatment plan with Physician/care team. - Done 10/17/19  · Educate patient on the importance of compliance with Physician follow-ups.  - Done 10/17/19  · Provide contact information for [facility]. - Done 10/17/19 [phone]  · Provide contact information for Physician office.  - Done 10/17/19 [phone]     Status  · Met    Patient will verbalize 3 food items that are high in iron within 3 weeks.   Foods High in Iron   Beef, liver, poultry, fish, eggs   Dried fruits (especially raisins, prunes, figs)    Legumes such as dried beans and lentils    Breads and cereals with iron added   Blackstrap molasses   Spinach and other green leafy vegetables   Foods cooked in cast-iron pans (especially true of acidic foods, such as tomatoes and vickie)    Interventions   · Assess for patient's ability to verbalize at least 3 food items that are high in iron.   · Educate patient on the importance of Dietary Compliance.  · Recognize and provide educational material (KRAMES) on Iron Deficiency Anemia. - Mailed 10/17/19; 10/30/19 Has not yet rec'd information   · Review food groups/types of food that are high in iron.   · Educate patient on the importance of including foods that are high in iron to her diet.   · Patient agrees to add at least 2 foods that are high in iron to her diet within the next 2-3 weeks. - 10/30/19 Has not yet rec'd information   Patient agrees to OPCM follow up within 10-12 days to assess progress to goal. - 10/30/19 Will reassess at next call.      Status  · Partially met    Patient will verbalize 3 signs and symptoms of Anemia within 1 month.    Signs and Symptoms of Anemia   Anemia makes you feel tired and run down.    When anemia becomes  severe, your skin becomes pale.    You may feel short of breath after physical activity.    Other symptoms include:   Headaches   Dizziness   Leg cramps with physical activity   Drowsiness   Restless legs    Interventions   · Assess for patient's ability to verbalize at least 3 signs and symptoms of Anemia.  · Recognize and provide educational material (KRAMES) on Iron Deficiency Anemia. - Mailed 10/17/19  · Review signs and symptoms of Anemia with patient. - Done 10/17/19  Patient agrees to review the educational information on Anemia within the next 2-3 weeks. - 10/30/19 Has not yet rec'd information   Patient agrees to OPCM follow up within 10-12 days to assess progress to goal. - 10/30/19 Will reassess at next call.      Status  · Partially met    Patient will verbalize 2 guidelines when caring for yourself at home with Anemia within 1 month.   Guidelines When Caring for Yourself at Home   Eat foods high in iron. This will boost the amount of iron stored in your body. It is a natural way to build up the number of blood cells.    Do not overexert yourself.   Talk with your healthcare provider before traveling by air or traveling to high altitudes.   Follow up with your healthcare provider in 2 months, or as advised.    Comply with laboratory testing as ordered by physician to be sure your anemia has been fixed.   Call your healthcare provider right away if any of these occur:   Shortness of breath or chest pain   Dizziness or fainting   Vomiting blood or passing red or black-colored stool     Interventions   · Assess for patient's ability to verbalize at least 2 guidelines when caring for yourself at home with Anemia.  · Empower patient to discuss treatment plan with Physician/care team. - Done 10/17/19  · Educate patient on the importance of compliance with scheduled laboratory testing and procedure. - Done 10/17/19  · Educate patient on the importance of compliance with Dietary Compliance. - Done  10/17/19  · Educate patient on the importance of compliance with Medication Compliance. - Done 10/17/19  · Recognize and provide educational material (KRAMES) on Iron Deficiency Anemia. - Mailed 10/17/19  · Review with patient guidelines when caring for yourself at home with Anemia.   Patient agrees to review the educational information on Anemia within the next 2-3 weeks. - 10/30/19 Has not yet rec'd information    Patient agrees to OPC follow up within 10-12 days to assess progress to goal. - 10/30/19 Will reassess at next call.       Status  · Partially met                   Patient/caregiver will have an action plan in place to manage and prevent complications of CHF prior to discharge from OPC - Priority: High      Overall Time to Completion  2 months from 10/17/2019     OPCM Identified Patient Barriers:  Advance Care Planning - Referred to OPCM   Nutrition - Referred to OPCM   Communication - Referred to OPCM   Financial - Referred to OPCM   Fall Risk - Care Plan Created     OPCM Identified Disease Education Barriers:  Education Barrier For Anemia - Care Plan Created  Education Barrier for CHF - Care Plan Created    Short Term Goals  Patient will measure and record the weight 1 times per day for 3 weeks.  Interventions   · Assess for availability of working scale in home setting. - Done 10/17/19 (Yes)  · Mail Weight log for home use. - Mailed 10/17/19; 10/30/19 Has not yet rec'd information  · Recognize and provide educational material (KRAMES) on CHF/CHF Flare. - Mailed 10/17/19; 10/30/19 Has not yet rec'd information  · Assess for compliance with daily measuring and logging of weight. - 10/17/19 Not weighing at all  · Review weight log with patient at follow up calls.   Patient agrees to start to weigh and log daily for the next 3 weeks. - 10/30/19 Has not started weighing daily; reports last weight of 107 lbs a couple of days ago  · Patient agrees to  OPCM follow up within 10-12 days to assess progress to goal. - 10/30/19 Not in compliance; will reassess at next call.      Status  · Partially met    Patient will verbalize importance of notifying doctor if gains more than 2 pounds in one day or 5 or more pounds in one week within 5 weeks.  Interventions   · Assess for patient's ability to verbalize importance of MD notification for sudden weight gain.   · Empower patient to discuss her history of CHF with current  Physician/care team. - Done 10/17/19  · Educate patient on the importance of compliance with Physician follow-ups. - Done 10/17/19  · Educate patient on the importance of Dietary Compliance (low sodium) to help prevent fluid retention. - Done 10/17/19  · Educate patient on the importance of Medication Compliance. - Done 10/17/19  · Recognize and provide educational material (KRAMES) on CHF/CHF Flare. - Mailed 10/17/19  Patient agrees to review the educational literature on CHF/CHF Flare within the next 3-4 weeks.  - 10/30/19 Has not yet rec'd information  · Patient agrees to OPCM follow up within 10-12 days to assess progress to goal. - 10/30/19 Will reassess at next call.      Status  · Partially met    Patient will verbalize 2 signs and symptoms of Congestive Heart Failure/CHF Flare within 2 months.    Signs and Symptoms of CHF/CHF Flare   Your feet, ankles, or lower legs get puffier.   You notice skin changes on your lower legs.   Your shoes feel too tight.   Your clothes are tighter in the waist.   You have trouble getting rings on or off your fingers.   You have to breathe harder even when youre doing your normal activities or when youre resting.   You are short of breath walking upstairs or even short distances.   You wake up at night short of breath or coughing.   You need to use more pillows or sit up to sleep.   You wake up tired or restless.   You feel weaker, dizzy, or more tired.   You have chest pain or changes in your heartbeat.   You  have a cough that wont go away.   You cant remember things or dont feel like eating.    Interventions   · Assess for patient's ability to verbalize at least 2 signs and symptoms of CHF/CHF Flare.   · Recognize and provide educational material (KRATATYANA) on CHF/CHF Flare. - Mailed 10/17/19  · Review signs and symptoms of CHF/CHF Flare with patient. - Done 10/30/19  Patient agrees to review the educational literature on CHF/CHF Flare within the next 3-4 weeks. - 10/30/19 Has not yet rec'd information  · Patient agrees to OPCM follow up within 10-12 days to assess progress to goal. - 10/30/19 Will reassess at next call.      Status  · Partially met    Patient will verbalize 2 ways of preventing complications due to disease process within 2 months.   Ways to Prevent Complications   Check your weight every day; sudden increase in weight gain could mean worsening heart failure.     Keep these things in mind:  ? Use the same scale every day.  ? Weigh yourself at the same time every day.  ? Make sure the scale is on a hard floor surface, not on a rug or carpet.  ? Keep a record of your weight every day so your healthcare provider can see it. If you are not given a log sheet for this, keep a separate journal for this purpose.    Cut back on the amount of salt (sodium) you eat.   ? Avoid high-salt foods. These include olives, pickles, smoked meats, salted potato chips, and most prepared foods.  ? Don't add salt to your food at the table. Use only small amounts of salt when cooking.  ? Read the labels carefully on food packages to learn how much salt or sodium is in each serving in the package. Remember, a can or package of food may contain more than 1 serving. If you eat all the food in the package, you may be getting more salt than you think.  Follow your healthcare provider's recommendations about how much fluid you should have. Be aware that some foods, such as soup, pudding, and juicy fruits like oranges or melons,  contain liquid. You'll need to count the liquid in those foods as part of your daily fluid intake.    If you smoke, stop smoking.   Cut back on how much alcohol you drink.  Lose weight if you are overweight. The excess weight adds a lot of stress on the workload of the heart.  Stay active. Talk with your provider about an exercise program that is safe for your heart.  Keep your feet elevated to reduce swelling. Ask your provider about support hose as a preventive treatment for daytime leg swelling.    Interventions   · Assess for patient's ability to verbalize at least 2 ways of preventing complications due to disease process.   · Collaborate with Physician as appropriate to meet patient needs.  · Empower patient to discuss treatment plan with Physician/care team. - Done 10/30/19  · Educate patient on the importance of compliance with Physician follow-ups. - Done 10/30/19  · Educate patient on the importance of Dietary Compliance (low sodium). - Done 10/30/19  · Educate patient on the importance of Exercise as tolerated. - Done 10/30/19  · Educate patient on the importance of Medication Compliance. - Done 10/30/19  · Educate patient on the importance of  Smoking Cessation. - Done 10/30/19  · Mail patient a copy of [employer] Smoking Cessation brochure. - Mailed 10/17/19  · Provide contact information for Coordinator of Tobacco Cessation Community Outreach, Ya Ford ([phone]). - Done 10/30/19  · Recognize and provide educational material (KRAMES) on CHF/CHF Flare and Smoking Cessation. - Mailed 10/17/19  · Review ways of preventing complications due to disease process with patient. - Done 10/30/19  Patient agrees to review the educational literature on CHF/CHF Flare and Smoking Cessation within the next 3-4 weeks. - 10/30/19 Has not yet rec'd information  · Patient agrees to OPCM follow up within 10-12 days to assess progress to goal. - 10/30/19 Will reassess at next call.      Status  · Partially  met           Patient/caregiver will have Safety Plan in place prior to discharge from OPC. - Priority: Medium      Overall Time to Completion  2 months from 10/17/2019     OPCM Identified Patient Barriers:  Advance Care Planning - Referred to OPCM   Nutrition - Referred to OPCM   Communication - Referred to OPCM   Financial - Referred to OPCM   Fall Risk - Care Plan Created     OPC Identified Disease Education Barriers:  Education Barrier For Anemia - Care Plan Created     Short Term Goals  Patient will put in place at least 4 applicable home safety measures to decrease the risk of patient falls within 2 months.  Interventions   · Assess patient's ability to perform ADLs. - Done 10/17/19  · Assess for compliance with at least 4 applicable home safety measures to decrease the risk of patient falls. - 10/30/19 (*10)  · Educate patient on the importance of daily Exercise as tolerated. - Done 10/17/19  · Encourage S/O to help patient perform ADLs according to the patient's capabilities.- Done 10/17/19   · Facilitate needed DME. - 10/17/19 Denies need for DME at this time  · In basket message sent to Physician regarding referral to Optometry for Eye Exam. - Sent message 10/18/19; 10/23/19 Referral to Optometry placed by PCP, Dr. Sung; 10/30/19 Patient states will schedule appt.   · Recognize and provide educational material (KB) on Fall Prevention. - Mailed 10/17/19   Review recommended Fall Prevention safety measures with patient: 10/30/19  Patient agrees to review Fall Prevention educational resources within the next 2-3 weeks. - 10/30/19 Has not rec'd information yet  Patient agrees to [facility] follow up within 10-12 days to assess progress to goal. - 10/30/19 Will assess at next call  Keep floors and stairs free of clutter and cords.*   Arrange furniture so there are clear pathways.*   Clean up any spills right away.  Install grab bars in the tub or shower. -  10/17/18 Denies need for grab bars.    Apply nonskid strips or put a nonskid rubber mat in the tub or shower.   Sit on a bath chair or tub bench to bathe. - 10/17/18 Denies need for chair or bench.    Use bathmats with nonskid backing.*   Use non-slip pads under rugs. Do not use area rugs or small throw rugs.*   Do not walk in poorly lit areas.*   Keep a flashlight in each room. Or put a lamp next to the bed within easy reach.*   Put nightlights in the bedrooms, hallways, kitchen, and bathrooms.*   Do not stand on chairs or wobbly ladders.*  Use caution when reaching overhead or looking upward. This position can cause a loss of balance.   Move or rearrange items that you use often. This will make them easier to find or reach.   Be sure your shoes fit properly, have non-slip bottoms and are in good condition.*    Wear shoes both inside and outside. Avoid going barefoot or wearing slippers.  Be cautious when going up and down stairs, curbs, and when walking on uneven sidewalks.   If your balance is poor, consider using a cane or walker. - 10/17/19 Denies need for cane or walker.  If your fall was related to use of sleeping medicines, talk to your doctor. Bedtime dosage may need to be reduced if you awaken during the night to go to the bathroom.     To reduce the need for nighttime bathroom trips:  ? Avoid drinking fluids for several hours before going to bed  ? Empty your bladder before going to bed*  ? Women can utilize a bedside commode  Stay as active as you can. Balance, flexibility, strength, and endurance all come from exercise. They all play a role in preventing falls. Ask your healthcare provider which types of activity are right for you.   Get your vision checked on a regular basis. - 10/17/19 Sent message to PCP for Optometry referral for Eye Exam; 10/30/19 Referral placed by PCP on 10/23/19 (Patient to schedule appointment).   Look at your home to find any safety hazards. Especially look at doorways,  walkways, and the driveway. Remove or repair any safety problems that you find.     Status  · Met                  Patient Instructions     Instructions were provided via the [facility] patient resources and are available for the patient to view on the patient portal.    Follow up in about 18 days (around 11/15/19) for RN Follow Up.     Todays OPCM Self-Management Care Plan was reviewed with the patients input and was based on identified barriers from todays assessment. Goals were reviewed today with the patient and the patient has agreed to work towards these goals to improve her overall well-being. Patient verbalized understanding of the care plan, goals, and all of today's instructions. Encouraged patient to communicate with her physician and health care team about health conditions and the treatment plan.  Provided my contact information today and encouraged patient to call me with any questions as needed. Perry Starr, RN-OPCM

## 2020-06-03 ENCOUNTER — TELEPHONE (OUTPATIENT)
Dept: FAMILY MEDICINE | Facility: CLINIC | Age: 50
End: 2020-06-03

## 2020-06-03 NOTE — TELEPHONE ENCOUNTER
----- Message from Samira Lawrence sent at 6/3/2020 10:28 AM CDT -----  Contact: patient  Type: Needs Medical Advice  Who Called:  patient  Symptoms (please be specific):  Sinus infection,disclored mucus,ear ache, sore throat  How long has patient had these symptoms:  2 weeks  Best Call Back Number: [phone]  Additional Information: Patient states her sinus issues are really worsening today and over the counter medications are not helping.  Please call to advise. Refused sameday apt.  Thanks

## 2020-06-04 ENCOUNTER — OFFICE VISIT (OUTPATIENT)
Dept: FAMILY MEDICINE | Facility: CLINIC | Age: 50
End: 2020-06-04
Payer: MEDICARE

## 2020-06-04 ENCOUNTER — HOSPITAL ENCOUNTER (OUTPATIENT)
Dept: RADIOLOGY | Facility: CLINIC | Age: 50
Discharge: HOME | End: 2020-06-04
Attending: NURSE PRACTITIONER
Payer: MEDICARE

## 2020-06-04 VITALS
OXYGEN SATURATION: 96 % | HEIGHT: 60 IN | HEART RATE: 64 BPM | BODY MASS INDEX: 22.33 KG/M2 | TEMPERATURE: 99 F | SYSTOLIC BLOOD PRESSURE: 104 MMHG | WEIGHT: 113.75 LBS | DIASTOLIC BLOOD PRESSURE: 62 MMHG

## 2020-06-04 DIAGNOSIS — J01.10 ACUTE NON-RECURRENT FRONTAL SINUSITIS: Primary | ICD-10-CM

## 2020-06-04 DIAGNOSIS — Z72.0 TOBACCO ABUSE: ICD-10-CM

## 2020-06-04 DIAGNOSIS — R05.9 COUGH: ICD-10-CM

## 2020-06-04 DIAGNOSIS — J06.9 UPPER RESPIRATORY TRACT INFECTION, UNSPECIFIED TYPE: ICD-10-CM

## 2020-06-04 PROCEDURE — 71046 XR CHEST PA AND LATERAL: ICD-10-PCS | Mod: 26,,, | Performed by: RADIOLOGY

## 2020-06-04 PROCEDURE — 99999 PR PBB SHADOW E&M-EST. PATIENT-LVL IV: CPT | Mod: PBBFAC,,, | Performed by: NURSE PRACTITIONER

## 2020-06-04 PROCEDURE — 99214 PR OFFICE/OUTPT VISIT, EST, LEVL IV, 30-39 MIN: ICD-10-PCS | Mod: S$PBB,,, | Performed by: NURSE PRACTITIONER

## 2020-06-04 PROCEDURE — 99214 OFFICE O/P EST MOD 30 MIN: CPT | Mod: S$PBB,,, | Performed by: NURSE PRACTITIONER

## 2020-06-04 PROCEDURE — 71046 X-RAY EXAM CHEST 2 VIEWS: CPT | Mod: 26,,, | Performed by: RADIOLOGY

## 2020-06-04 PROCEDURE — 71046 X-RAY EXAM CHEST 2 VIEWS: CPT | Mod: TC,FY,PO

## 2020-06-04 PROCEDURE — 99999 PR PBB SHADOW E&M-EST. PATIENT-LVL IV: ICD-10-PCS | Mod: PBBFAC,,, | Performed by: NURSE PRACTITIONER

## 2020-06-04 PROCEDURE — 99214 OFFICE O/P EST MOD 30 MIN: CPT | Mod: PBBFAC,25,PO | Performed by: NURSE PRACTITIONER

## 2020-06-04 RX ORDER — AZELASTINE 1 MG/ML
1 SPRAY, METERED NASAL 2 TIMES DAILY
Qty: 30 ML | Refills: 1 | Status: SHIPPED | OUTPATIENT
Start: 2020-06-04 | End: 2020-11-20 | Stop reason: SDUPTHER

## 2020-06-04 RX ORDER — LEVOCETIRIZINE DIHYDROCHLORIDE 5 MG/1
5 TABLET, FILM COATED ORAL NIGHTLY
Qty: 30 TABLET | Refills: 11 | Status: SHIPPED | OUTPATIENT
Start: 2020-06-04 | End: 2021-05-17

## 2020-06-04 RX ORDER — AMOXICILLIN AND CLAVULANATE POTASSIUM 875; 125 MG/1; MG/1
1 TABLET, FILM COATED ORAL EVERY 12 HOURS
Qty: 14 TABLET | Refills: 0 | Status: SHIPPED | OUTPATIENT
Start: 2020-06-04 | End: 2020-06-11

## 2020-06-04 RX ORDER — TIZANIDINE HYDROCHLORIDE 4 MG/1
CAPSULE, GELATIN COATED ORAL
COMMUNITY
End: 2020-06-12

## 2020-06-04 RX ORDER — GABAPENTIN 600 MG/1
600 TABLET ORAL 3 TIMES DAILY
COMMUNITY
End: 2020-08-05

## 2020-06-04 NOTE — PATIENT INSTRUCTIONS
Self-Care for Sinusitis     Drinking plenty of water can help sinuses drain.   Sinusitis can often be managed with self-care. Self-care can keep sinuses moist and make you feel more comfortable. Remember to follow your doctor's instructions closely. This can make a big difference in getting your sinus problem under control.  Drink fluids  Drinking extra fluids helps thin your mucus. This lets it drain from your sinuses more easily. Have a glass of water every hour or two. A humidifier helps in much the same way. Fluids can also offset the drying effects of certain medicines. If you use a humidifier, follow the product maker's instructions on how to use it. Clean it on a regular schedule.  Use saltwater rinses  Rinses help keep your sinuses and nose moist. Mix a teaspoon of salt in 8 ounces of fresh, warm water. Use a bulb syringe to gently squirt the water into your nose a few times a day. You can also buy ready-made saline nasal sprays.  Apply hot or cold packs  Applying heat to the area surrounding your sinuses may make you feel more comfortable. Use a hot water bottle or a hand towel dipped in hot water. Some people also find ice packs effective for relieving pain.  Medicines  Your doctor may prescribe medications to help treat your sinusitis. If you have an infection, antibiotics can help clear it up. If you are prescribed antibiotics, take all pills on schedule until they are gone, even if you feel better. Decongestants help relieve swelling. Use decongestant sprays for short periods only under the direction of your doctor. If you have allergies, your doctor may prescribe medications to help relieve them.   Date Last Reviewed: 10/1/2016  © 7277-1472 [employer]. [address] [place] [address]. All rights reserved. This information is not intended as a substitute for professional medical care. Always follow your healthcare professional's instructions.

## 2020-06-04 NOTE — PROGRESS NOTES
Patient ID: Alisson Eldridge is a 49 y.o. female.    Chief Complaint:   Sore Throat (sinus pressure) and Otalgia    Sinus Problem   This is a new problem. The current episode started 1 to 4 weeks ago. The problem has been gradually worsening since onset. There has been no fever. The pain is mild. Associated symptoms include congestion, coughing, ear pain, sinus pressure, sneezing and a sore throat. Pertinent negatives include no chills, diaphoresis, hoarse voice, neck pain, shortness of breath or swollen glands. Past treatments include oral decongestants (Mucinex and Benedryl ). The treatment provided no relief.      Patient is new to me. Reviewed past medical and social history.    Past Medical History:   Diagnosis Date    Abnormal uterine and vaginal bleeding, unspecified 10/29/2018    Allergy     Arthritis     Asthma     Cancer     cervical    CHF (congestive heart failure)     one episode, no follow up    Chronic back pain     Depression     GERD (gastroesophageal reflux disease)     History of psychiatric care     History of psychiatric hospitalization     Menorrhagia with regular cycle 10/11/2018    Migraine     Primary dysmenorrhea 10/11/2018    Psychiatric exam requested by authority     Psychiatric problem     Scoliosis     Therapy      Past Surgical History:   Procedure Laterality Date    APPENDECTOMY      COLPOSCOPY      LAPAROSCOPIC SALPINGO-OOPHORECTOMY  10/29/2018    Procedure: SALPINGO-OOPHORECTOMY, LAPAROSCOPIC;  Surgeon: Matthew Rene MD;  Location: [facility] OR;  Service: OB/GYN;;    LAPAROSCOPIC TOTAL HYSTERECTOMY N/A 10/29/2018    Procedure: HYSTERECTOMY, TOTAL, LAPAROSCOPIC;  Surgeon: Matthew Rene MD;  Location: [facility] OR;  Service: OB/GYN;  Laterality: N/A;    MULTIPLE TOOTH EXTRACTIONS       Current Outpatient Medications on File Prior to Visit   Medication Sig Dispense Refill    acetaminophen (TYLENOL) 500 MG tablet Take 1,000 mg by mouth every 6 (six) hours as  needed for Pain.      fluticasone propionate (FLONASE) 50 mcg/actuation nasal spray 1 spray (50 mcg total) by Each Nostril route 2 (two) times daily as needed. 15 g 0    gabapentin (NEURONTIN) 600 MG tablet Take 600 mg by mouth 3 (three) times daily.      guaifenesin/pseudoephedrne HCl (MUCINEX D ORAL) Take by mouth.      HYDROcodone-acetaminophen (NORCO) 7.5-325 mg per tablet Take 1 tablet by mouth every 6 (six) hours as needed. 28 tablet 0    ibuprofen (ADVIL,MOTRIN) 600 MG tablet Take 1 tablet (600 mg total) by mouth every 8 (eight) hours as needed for Pain. 20 tablet 0    omeprazole (PRILOSEC) 40 MG capsule Take 40 mg by mouth once daily.      tiZANidine 4 mg Cap Take by mouth.      [DISCONTINUED] cetirizine (ZYRTEC) 10 MG tablet Take 1 tablet (10 mg total) by mouth once daily. 30 tablet 0    [DISCONTINUED] fexofenadine (ALLEGRA) 180 MG tablet Take 180 mg by mouth once daily.      [DISCONTINUED] ranitidine (ZANTAC) 300 MG tablet Take 1 tablet (300 mg total) by mouth every evening. 30 tablet 11     No current facility-administered medications on file prior to visit.        Review of Systems   Constitutional: Negative for chills and diaphoresis.   HENT: Positive for congestion, ear pain, postnasal drip, rhinorrhea, sinus pressure, sinus pain, sneezing and sore throat. Negative for hoarse voice.    Respiratory: Positive for cough. Negative for chest tightness, shortness of breath and wheezing.    Cardiovascular: Negative for chest pain and palpitations.   Gastrointestinal: Negative for abdominal pain, diarrhea, nausea and vomiting.   Musculoskeletal: Negative for myalgias and neck pain.   Skin: Negative for rash.   Allergic/Immunologic: Positive for environmental allergies.   All other systems reviewed and are negative.      Objective:      Physical Exam   Constitutional: She is oriented to person, place, and time. She appears well-developed and well-nourished.   HENT:   Head: Normocephalic and  atraumatic.   Right Ear: Hearing, tympanic membrane, external ear and ear canal normal.   Left Ear: Hearing, tympanic membrane, external ear and ear canal normal.   Nose: Mucosal edema and rhinorrhea present. Right sinus exhibits maxillary sinus tenderness and frontal sinus tenderness. Left sinus exhibits maxillary sinus tenderness and frontal sinus tenderness.   Mouth/Throat: Uvula is midline. No dental abscesses or uvula swelling. Posterior oropharyngeal edema and posterior oropharyngeal erythema present. No tonsillar abscesses. Tonsils are 1+ on the right. Tonsils are 1+ on the left.   Eyes: Pupils are equal, round, and reactive to light. Conjunctivae and EOM are normal.   Neck: Normal range of motion. Neck supple.   Cardiovascular: Normal rate, regular rhythm and normal heart sounds.   Pulmonary/Chest: Effort normal and breath sounds normal.   Abdominal: Soft. Bowel sounds are normal.   Musculoskeletal: Normal range of motion.   Neurological: She is alert and oriented to person, place, and time.   Skin: Skin is warm and dry.   Psychiatric: She has a normal mood and affect.   Vitals reviewed.      Assessment:       1. Acute non-recurrent frontal sinusitis    2. Upper respiratory tract infection, unspecified type    3. Cough    4. Tobacco abuse        Plan:       Alisson was seen today for sore throat and otalgia.    Diagnoses and all orders for this visit:    Acute non-recurrent frontal sinusitis  -     amoxicillin-clavulanate 875-125mg (AUGMENTIN) 875-125 mg per tablet; Take 1 tablet by mouth every 12 (twelve) hours. for 7 days    Upper respiratory tract infection, unspecified type  -     azelastine (ASTELIN) 137 mcg (0.1 %) nasal spray; 1 spray (137 mcg total) by Nasal route 2 (two) times daily.  -     levocetirizine (XYZAL) 5 MG tablet; Take 1 tablet (5 mg total) by mouth every evening.    Cough  -     X-Ray Chest PA And Lateral; Future    Tobacco abuse    X-ray today. Will call with results and discuss further  plan of care  OTC cough and cold medications  Increase fluids  Good hand hygiene  Humidifier   Patient education provided.  All questions and concerns addressed  RTC PRN and if symptoms worsen or fail to improve  Patient verbalizes understanding        Patient Instructions       Self-Care for Sinusitis     Drinking plenty of water can help sinuses drain.   Sinusitis can often be managed with self-care. Self-care can keep sinuses moist and make you feel more comfortable. Remember to follow your doctor's instructions closely. This can make a big difference in getting your sinus problem under control.  Drink fluids  Drinking extra fluids helps thin your mucus. This lets it drain from your sinuses more easily. Have a glass of water every hour or two. A humidifier helps in much the same way. Fluids can also offset the drying effects of certain medicines. If you use a humidifier, follow the product maker's instructions on how to use it. Clean it on a regular schedule.  Use saltwater rinses  Rinses help keep your sinuses and nose moist. Mix a teaspoon of salt in 8 ounces of fresh, warm water. Use a bulb syringe to gently squirt the water into your nose a few times a day. You can also buy ready-made saline nasal sprays.  Apply hot or cold packs  Applying heat to the area surrounding your sinuses may make you feel more comfortable. Use a hot water bottle or a hand towel dipped in hot water. Some people also find ice packs effective for relieving pain.  Medicines  Your doctor may prescribe medications to help treat your sinusitis. If you have an infection, antibiotics can help clear it up. If you are prescribed antibiotics, take all pills on schedule until they are gone, even if you feel better. Decongestants help relieve swelling. Use decongestant sprays for short periods only under the direction of your doctor. If you have allergies, your doctor may prescribe medications to help relieve them.   Date Last Reviewed:  10/1/2016  © 2898-6749 [employer]. [address] [place] [address], [place] [address]. All rights reserved. This information is not intended as a substitute for professional medical care. Always follow your healthcare professional's instructions.

## 2020-06-05 ENCOUNTER — TELEPHONE (OUTPATIENT)
Dept: FAMILY MEDICINE | Facility: CLINIC | Age: 50
End: 2020-06-05

## 2020-06-05 NOTE — TELEPHONE ENCOUNTER
Spoke to patient in regards to test results. Patient verbalized understanding. Patient has no questions or concerns at this time.

## 2020-06-05 NOTE — TELEPHONE ENCOUNTER
----- Message from Samira Lawrence sent at 6/5/2020 10:35 AM CDT -----  Contact: patient  Type:  Test Results  Who Called:  patient  Name of Test (Lab/Mammo/Etc):  Chest Xray  Date of Test:  6/4/20  Ordering Provider:  onel  Where the test was performed:  [facility]  Best Call Back Number:  [phone]  Additional Information:  Please call to advise.  Thanks!

## 2020-06-11 ENCOUNTER — TELEPHONE (OUTPATIENT)
Dept: FAMILY MEDICINE | Facility: CLINIC | Age: 50
End: 2020-06-11

## 2020-06-11 DIAGNOSIS — R05.9 COUGH: ICD-10-CM

## 2020-06-11 DIAGNOSIS — J01.91 ACUTE RECURRENT SINUSITIS, UNSPECIFIED LOCATION: Primary | ICD-10-CM

## 2020-06-11 NOTE — TELEPHONE ENCOUNTER
----- Message from Ryan Khanna sent at 6/11/2020 10:42 AM CDT -----  Contact: Destinee [phone]  Type:  RX Refill Request    Who Called:  Destinee [phone]    Refill or New Rx:  Refill    RX Name and Strength:   amoxicillin-clavulanate 875-125mg (AUGMENTIN) 875-125 mg per tablet 14 tablet 0 6/4/2020 6/11/2020 --  Sig - Route: Take 1 tablet by mouth every 12 (twelve) hours. for 7 days - Oral  Sent to pharmacy as: amoxicillin-clavulanate 875-125mg (AUGMENTIN) 875-125 mg per tablet  Class: Normal  Order: [id number]    How is the patient currently taking it? (ex. 1XDay):  See above    Is this a 30 day or 90 day RX:  See above    Preferred Pharmacy with phone number:      [id number] [place], [place] - [address]  & [facility]  [address] [address]  Phone: [phone] Fax: [phone]      Local or Mail Order:  Local    Ordering Provider:  Dr Ramesh Romero Call Back Number:  Ptnt [phone]    Additional Information:  Advised her symptoms have returned, woul like a refill on the antibiotics.

## 2020-06-12 ENCOUNTER — HOSPITAL ENCOUNTER (OUTPATIENT)
Dept: RADIOLOGY | Facility: HOSPITAL | Age: 50
Discharge: HOME | End: 2020-06-12
Attending: NURSE PRACTITIONER
Payer: MEDICARE

## 2020-06-12 DIAGNOSIS — J01.91 ACUTE RECURRENT SINUSITIS, UNSPECIFIED LOCATION: ICD-10-CM

## 2020-06-12 PROCEDURE — 70486 CT MAXILLOFACIAL W/O DYE: CPT | Mod: 26,,, | Performed by: RADIOLOGY

## 2020-06-12 PROCEDURE — 70486 CT MAXILLOFACIAL W/O DYE: CPT | Mod: TC

## 2020-06-12 PROCEDURE — 70486 CT SINUSES WITHOUT CONTRAST: ICD-10-PCS | Mod: 26,,, | Performed by: RADIOLOGY

## 2020-06-12 RX ORDER — METHYLPREDNISOLONE 4 MG/1
TABLET ORAL
Qty: 1 PACKAGE | Refills: 0 | Status: SHIPPED | OUTPATIENT
Start: 2020-06-12 | End: 2020-06-23 | Stop reason: ALTCHOICE

## 2020-06-12 RX ORDER — PROMETHAZINE HYDROCHLORIDE AND DEXTROMETHORPHAN HYDROBROMIDE 6.25; 15 MG/5ML; MG/5ML
5 SYRUP ORAL EVERY 4 HOURS PRN
Qty: 118 ML | Refills: 0 | Status: SHIPPED | OUTPATIENT
Start: 2020-06-12 | End: 2020-06-22

## 2020-06-12 RX ORDER — CIPROFLOXACIN 500 MG/1
500 TABLET ORAL EVERY 12 HOURS
Qty: 14 TABLET | Refills: 0 | Status: SHIPPED | OUTPATIENT
Start: 2020-06-12 | End: 2020-06-19

## 2020-06-12 NOTE — TELEPHONE ENCOUNTER
Spoke to patient. Symptoms have been persistent since last visit including congestion, ear pain, postnasal drip, sinus pain and pressure, and cough. Sinus pain and pressure rated a constant 8/10. She has completed Augmentin and has been taking xyzal and azelastine as prescribed. Cipro and medrol dose pack prescribed along with promethazine Dm for cough. CT sinuses ordered. Patient to follow up in 2 weeks.

## 2020-06-12 NOTE — TELEPHONE ENCOUNTER
Pt CT sinuses scheduled Friday, 06/12/2020 at [facility] for 1330. Pt 2 week f/u appointment scheduled Wednesday, 07/01/2020 at 1120.  Pt stated, she has been experiencing dizziness for the past 2 days. Please advise.

## 2020-06-17 ENCOUNTER — PATIENT OUTREACH (OUTPATIENT)
Dept: ADMINISTRATIVE | Facility: HOSPITAL | Age: 50
End: 2020-06-17

## 2020-06-17 NOTE — PROGRESS NOTES
Chart review completed 06/17/2020.  Care Everywhere updates requested and reviewed.  Immunizations reconciled. Media reviewed.     DIS, Lab rafaela, and quest reviewed    LETTER MAILED

## 2020-06-17 NOTE — LETTER
"June 17, 2020    Alisson Eldridge  [address]  [place] [address]             [facility]  [address]  [facility] [address]  Phone: [phone] [facility] is committed to your overall health.  To help you get the most out of each of your visits, we will review your information to make sure you are up to date on all of your recommended tests and/or procedures.      Dr. Delon Sung, DO has found that your chart shows you may be due for a:    COLORECTAL CANCER SCREENING  Fasting cholesterol labs (Lipid Panel)     If you have had any of the above done at another facility, please bring the records or information with you so that your record at [facility] will be complete.  If you would like to schedule any of these, please contact the clinic at [phone].    Our records show you are due for colon cancer screening.  If you have already had your screening, or you have made an appointment for your screening, congratulations!  You're on the road to good health. If you haven't signed up for a colorectal screening please accept this invitation to be screened.      According to the American Cancer Society, colon cancer is the third most common cancer for people in the [place].  A simple screening test "Fit Kit" - done in your own home - can help find colon cancer at an early stage when it can be treated, even before any signs or symptoms develop. THIS IS A YEARLY TEST.    Testing for blood in your stool (feces or bowel movement) is the first step. If you have an upcoming visit with your Primary Care Physician you can  a Fit Kit during your visit or you can  a Fit Kit at your Primary Care Clinic prior to your visit.    The Fit Kit includes:    · Instructions on how to perform the test  · (1) Sheet of tissue paper  · (1) Small Absorption pad  · (1) Bottle to hold the sample and a small probe to help you take the sample  · (1) Small plastic bio-hazard bag  · (1) Postage-paid return " "envelope    Please do your test (the instructions will tell you how) and then return your sample in the postage-paid return envelope within 24 hours of collection.     If your test results are negative, you won't need testing again for another year.  If results show you need more testing, we will call you with next steps.    Regular colorectal cancer screening is one of the most powerful weapons for preventing colon cancer.  The website https://www.cancer.org/cancer/colon-rectal-cancer.html can answer many of your questions about this cancer and its prevention.  Just search for "colorectal cancer".        If you are currently taking medication, please bring it with you to your appointment for review.    Also, if you have any type of Advanced Directives, please bring them with you to your office visit so we may scan them into your chart.    Thank You,    Your [facility] Team,  Dr Delon Sung, DO Ashley Mello LPN Clinical Care Coordinator  [facility] [address] [place] [address]  Phone [phone]  Fax [phone]       "

## 2020-06-19 ENCOUNTER — TELEPHONE (OUTPATIENT)
Dept: FAMILY MEDICINE | Facility: CLINIC | Age: 50
End: 2020-06-19

## 2020-06-19 NOTE — TELEPHONE ENCOUNTER
----- Message from Emi Bob sent at 6/19/2020 10:32 AM CDT -----  Regarding: Need Medical Advice  Type: Needs Medical Advice  Who Called:   pt  Symptoms (please be specific):   thrush in mouth  How long has patient had these symptoms:  started last night  Pharmacy name and phone #:    [id number] - [place], [place] [address] [facility]  [address] [address]  Phone: [phone] Fax: [phone]     Best Call Back Number:  [phone]  Additional Information:  Pt would like a call back

## 2020-06-19 NOTE — TELEPHONE ENCOUNTER
Called pt regarding msg. Pt stated Thursh started Wed night. Pt is unable to wear her dentures at the moment, thrush is on the gum line. Pt verbalized she is taking pain meds from pain management from the pain and also gargling salt water. This morning on 6/19/20 it started bleeding.       Pt is still having some of the sx she was having when she seen you in office. And pt is coughing up some green mucus as well.    Please advise.

## 2020-06-22 ENCOUNTER — TELEPHONE (OUTPATIENT)
Dept: FAMILY MEDICINE | Facility: CLINIC | Age: 50
End: 2020-06-22

## 2020-06-22 NOTE — TELEPHONE ENCOUNTER
----- Message from Desiree Biswas sent at 6/22/2020  2:55 PM CDT -----  Type:  Patient Returning Call    Who Called:  patient   Who Left Message for Patient:  latoya  Does the patient know what this is regarding?:  scheduling  Best Call Back Number:  [phone] (home)   Additional Information:  na

## 2020-06-22 NOTE — TELEPHONE ENCOUNTER
Called pt to verify appointment with Mrs. Chandler due to problems with thrush. Pt verbalized understanding.

## 2020-06-23 ENCOUNTER — OFFICE VISIT (OUTPATIENT)
Dept: FAMILY MEDICINE | Facility: CLINIC | Age: 50
End: 2020-06-23
Payer: MEDICARE

## 2020-06-23 VITALS
SYSTOLIC BLOOD PRESSURE: 110 MMHG | HEART RATE: 96 BPM | WEIGHT: 112.19 LBS | DIASTOLIC BLOOD PRESSURE: 86 MMHG | HEIGHT: 60 IN | OXYGEN SATURATION: 97 % | BODY MASS INDEX: 22.03 KG/M2 | TEMPERATURE: 98 F | RESPIRATION RATE: 18 BRPM

## 2020-06-23 DIAGNOSIS — J01.91 ACUTE RECURRENT SINUSITIS, UNSPECIFIED LOCATION: ICD-10-CM

## 2020-06-23 DIAGNOSIS — B37.0 OROPHARYNGEAL CANDIDIASIS: Primary | ICD-10-CM

## 2020-06-23 DIAGNOSIS — Z72.0 TOBACCO ABUSE: ICD-10-CM

## 2020-06-23 DIAGNOSIS — R05.9 COUGH: ICD-10-CM

## 2020-06-23 PROCEDURE — 99214 OFFICE O/P EST MOD 30 MIN: CPT | Mod: S$PBB,,, | Performed by: NURSE PRACTITIONER

## 2020-06-23 PROCEDURE — 99214 PR OFFICE/OUTPT VISIT, EST, LEVL IV, 30-39 MIN: ICD-10-PCS | Mod: S$PBB,,, | Performed by: NURSE PRACTITIONER

## 2020-06-23 PROCEDURE — 99215 OFFICE O/P EST HI 40 MIN: CPT | Mod: PBBFAC,PO | Performed by: NURSE PRACTITIONER

## 2020-06-23 PROCEDURE — 99999 PR PBB SHADOW E&M-EST. PATIENT-LVL V: ICD-10-PCS | Mod: PBBFAC,,, | Performed by: NURSE PRACTITIONER

## 2020-06-23 PROCEDURE — 99999 PR PBB SHADOW E&M-EST. PATIENT-LVL V: CPT | Mod: PBBFAC,,, | Performed by: NURSE PRACTITIONER

## 2020-06-23 RX ORDER — PROMETHAZINE HYDROCHLORIDE AND DEXTROMETHORPHAN HYDROBROMIDE 6.25; 15 MG/5ML; MG/5ML
5 SYRUP ORAL EVERY 4 HOURS PRN
Qty: 180 ML | Refills: 0 | Status: SHIPPED | OUTPATIENT
Start: 2020-06-23 | End: 2020-07-03

## 2020-06-23 RX ORDER — NYSTATIN 100000 [USP'U]/ML
4 SUSPENSION ORAL 4 TIMES DAILY
Qty: 160 ML | Refills: 0 | Status: SHIPPED | OUTPATIENT
Start: 2020-06-23 | End: 2020-07-03

## 2020-06-23 NOTE — PATIENT INSTRUCTIONS
Candida Infection: Thrush  Thrush is a type of fungal infection in the mouth and throat. Thrush does not usually affect healthy adults. It is more common in people with a weakened immune system. It is also more likely if you take antibiotics. Thrush is normally not contagious.  Understanding fungus in the mouth and throat  Your mouth and throat normally contain millions of tiny organisms. These include bacteria and yeasts. Many of these do not cause any problems. In fact, they may help fight disease.  Yeasts are a type of fungus. A type of yeast called Candida normally lives on your skin. It is also found on the membranes of your mouth and throat. Usually, this yeast grows only in small amounts and is harmless. But in some cases, Candida can grow out of control and cause thrush. Thrush is related to other kinds of Candida infections that can grow all over the body. Thrush refers to an infection of only the mouth and throat.  What causes thrush?  Thrush happens when something lets too much Candida grow inside your mouth and throat. Certain things that change the normal balance of organisms in the mouth can lead to thrush. One example is antibiotic medicine. This medicine may kill some of the normal bacteria in your mouth. Candida can then grow freely. People on antibiotics have an increased risk for thrush.  You have a higher risk for thrush if you:  · Wear dentures  · Are getting chemotherapy  · Are getting radiation therapy  · Have diabetes  · Have a transplanted organ  · Use corticosteroids  · Have a weakened immune system, such as from AIDS  · Are an older adult  Symptoms of thrush  Some people with thrush do not have any symptoms. Symptoms of thrush can include:  · A dry, cottony feeling in your mouth  · Loss of taste  · Pain while eating or swallowing  · White or red patches inside your mouth or on the back of your throat  Diagnosing thrush  Your health care provider will ask about your medical history and  your symptoms. He or she will look closely at your mouth and throat. White or red patches will be scraped with a tongue depressor. The sample will be sent to a lab to test. This test can usually confirm thrush.  If you have thrush, you may also have esophageal candidiasis. This is common in people who have HIV. Your health care provider may check for this condition with an upper endoscopy. This is a procedure to look at the esophagus. A tissue sample may be taken to test.  Treatment for thrush  It is important to treat thrush early. Untreated, it may turn into a more serious form of widespread infection. Thrush is usually treated with antifungal medicine. The medicine is put directly in your mouth and throat. You may be given a swish and swallow medicine or an antifungal lozenge.  In some cases, you may need an antifungal pill. This can remove Candida throughout your body. Or you may need medicine through an IV. These treatments depend on how severe your infection is, and what other health conditions you have.  If you are at high risk for thrush, you may need to keep taking oral antifungal medicine. This is to help prevent thrush in the future.  What happens if you dont get treated for thrush?  If untreated, the Candida may spread throughout your body. They may even enter your bloodstream. This can cause serious problems, such as organ failure and even death. Bloodstream infection may need to be treated with high doses of antifungal medicine through an IV.  Systemic infection is much more likely in people who are very ill. It is also more common in those who have serious problems with their immune system. Additional risk factors for systemic infection in very ill people include:  · Central venous lines  · IV nutrition  · Broad-spectrum antibiotics  · Kidney failure  · Recent surgery  Preventing thrush  You may be able to help prevent some cases of thrush. Make sure to:  · Practice good oral hygiene. Try using a  chlorhexidine mouthwash.  · Clean your dentures regularly as instructed. Make sure they fit you correctly.  · After using a corticosteroid inhaler, rinse out your mouth with water or mouthwash.  · Do not use broad-spectrum antibiotics, if possible.  · Get treated for health problems that increase your risk for thrush, such as diabetes.     When to call the health care provider  Call your health care provider right away if you have any of these:  · Cottony feeling in your mouth  · Loss of taste  · Pain while eating or swallowing  · White or red patches inside your mouth or on the back of your throat   Date Last Reviewed: 3/19/2015  © 1955-5728 [employer]. [address] [place] [address], [place] [address]. All rights reserved. This information is not intended as a substitute for professional medical care. Always follow your healthcare professional's instructions.

## 2020-06-23 NOTE — PROGRESS NOTES
Patient ID: Alisson Eldridge is a 49 y.o. female.    Chief Complaint:   Thrush    Sinus Problem  This is a recurrent problem. The current episode started 1 to 4 weeks ago. The problem has been gradually worsening since onset. There has been no fever. The pain is moderate. Associated symptoms include chills, congestion, coughing, sinus pressure and a sore throat. Pertinent negatives include no diaphoresis, ear pain, headaches, hoarse voice, neck pain, shortness of breath, sneezing or swollen glands. Past treatments include antibiotics, oral decongestants, saline sprays and spray decongestants (Recent treatment with Augmentin and Cipro as well as a medrol dose pack). The treatment provided mild relief.   Patient also reports generalized red and white painful patches in her mouth for the past week. She has tried colgate with peroxide with mild relief. Pain is worse under her dentures.      Patient is new to me. Reviewed past medical and social history.    Past Medical History:   Diagnosis Date    Abnormal uterine and vaginal bleeding, unspecified 10/29/2018    Allergy     Arthritis     Asthma     Cancer     cervical    CHF (congestive heart failure)     one episode, no follow up    Chronic back pain     Depression     GERD (gastroesophageal reflux disease)     History of psychiatric care     History of psychiatric hospitalization     Menorrhagia with regular cycle 10/11/2018    Migraine     Primary dysmenorrhea 10/11/2018    Psychiatric exam requested by authority     Psychiatric problem     Scoliosis     Therapy      Past Surgical History:   Procedure Laterality Date    APPENDECTOMY      COLPOSCOPY      LAPAROSCOPIC SALPINGO-OOPHORECTOMY  10/29/2018    Procedure: SALPINGO-OOPHORECTOMY, LAPAROSCOPIC;  Surgeon: Matthew Rene MD;  Location: [facility];  Service: OB/GYN;;    LAPAROSCOPIC TOTAL HYSTERECTOMY N/A 10/29/2018    Procedure: HYSTERECTOMY, TOTAL, LAPAROSCOPIC;  Surgeon: Matthew PRECIADO  MD Anju;  Location: [facility] OR;  Service: OB/GYN;  Laterality: N/A;    MULTIPLE TOOTH EXTRACTIONS       Current Outpatient Medications on File Prior to Visit   Medication Sig Dispense Refill    acetaminophen (TYLENOL) 500 MG tablet Take 1,000 mg by mouth every 6 (six) hours as needed for Pain.      azelastine (ASTELIN) 137 mcg (0.1 %) nasal spray 1 spray (137 mcg total) by Nasal route 2 (two) times daily. 30 mL 1    fluticasone propionate (FLONASE) 50 mcg/actuation nasal spray 1 spray (50 mcg total) by Each Nostril route 2 (two) times daily as needed. 15 g 0    gabapentin (NEURONTIN) 600 MG tablet Take 600 mg by mouth 3 (three) times daily.      guaifenesin/pseudoephedrne HCl (MUCINEX D ORAL) Take by mouth.      HYDROcodone-acetaminophen (NORCO) 7.5-325 mg per tablet Take 1 tablet by mouth every 6 (six) hours as needed. 28 tablet 0    ibuprofen (ADVIL,MOTRIN) 600 MG tablet Take 1 tablet (600 mg total) by mouth every 8 (eight) hours as needed for Pain. 20 tablet 0    levocetirizine (XYZAL) 5 MG tablet Take 1 tablet (5 mg total) by mouth every evening. 30 tablet 11    omeprazole (PRILOSEC) 40 MG capsule Take 40 mg by mouth once daily.      [] promethazine-dextromethorphan (PROMETHAZINE-DM) 6.25-15 mg/5 mL Syrp Take 5 mLs by mouth every 4 (four) hours as needed (Cough). 118 mL 0    [DISCONTINUED] methylPREDNISolone (MEDROL DOSEPACK) 4 mg tablet use as directed (Patient not taking: Reported on 2020) 1 Package 0     No current facility-administered medications on file prior to visit.        Review of Systems   Constitutional: Positive for chills. Negative for diaphoresis.   HENT: Positive for congestion, postnasal drip, sinus pressure, sinus pain and sore throat. Negative for ear pain, hoarse voice and sneezing.    Respiratory: Positive for cough. Negative for shortness of breath.         Cough productive of white and green mucous   Cardiovascular: Negative for chest pain and palpitations.    Gastrointestinal: Negative for diarrhea, nausea and vomiting.   Musculoskeletal: Negative for neck pain.   Skin: Negative for rash.   Neurological: Negative for headaches.   All other systems reviewed and are negative.      Objective:      Physical Exam  Vitals signs reviewed.   Constitutional:       Appearance: Normal appearance. She is well-developed and normal weight.   HENT:      Head: Normocephalic and atraumatic.      Right Ear: Tympanic membrane, ear canal and external ear normal. There is no impacted cerumen.      Left Ear: Tympanic membrane, ear canal and external ear normal. There is no impacted cerumen.      Nose: Congestion present.   Eyes:      Conjunctiva/sclera: Conjunctivae normal.      Pupils: Pupils are equal, round, and reactive to light.   Neck:      Musculoskeletal: Normal range of motion and neck supple.   Cardiovascular:      Rate and Rhythm: Normal rate and regular rhythm.      Heart sounds: Normal heart sounds.   Pulmonary:      Effort: Pulmonary effort is normal.      Breath sounds: Normal breath sounds.   Abdominal:      General: Bowel sounds are normal.      Palpations: Abdomen is soft.   Musculoskeletal: Normal range of motion.   Skin:     General: Skin is warm and dry.   Neurological:      General: No focal deficit present.      Mental Status: She is alert and oriented to person, place, and time.   Psychiatric:         Mood and Affect: Mood normal.         Behavior: Behavior normal.         Assessment:       1. Oropharyngeal candidiasis    2. Acute recurrent sinusitis, unspecified location    3. Cough    4. Tobacco abuse        Plan:       Alisson was seen today for thrush.    Diagnoses and all orders for this visit:    Oropharyngeal candidiasis  -     nystatin (MYCOSTATIN) 100,000 unit/mL suspension; Take 4 mLs (400,000 Units total) by mouth 4 (four) times daily. for 10 days    Acute recurrent sinusitis, unspecified location  -     Ambulatory referral/consult to ENT; Future    Cough  -      promethazine-dextromethorphan (PROMETHAZINE-DM) 6.25-15 mg/5 mL Syrp; Take 5 mLs by mouth every 4 (four) hours as needed (cough).    Tobacco abuse  -     Ambulatory referral/consult to Smoking Cessation Program; Future      Patient education provided.  All questions and concerns addressed  RTC PRN and if symptoms worsen or fail to improve  Patient verbalizes understanding        Patient Instructions       Candida Infection: Thrush  Thrush is a type of fungal infection in the mouth and throat. Thrush does not usually affect healthy adults. It is more common in people with a weakened immune system. It is also more likely if you take antibiotics. Thrush is normally not contagious.  Understanding fungus in the mouth and throat  Your mouth and throat normally contain millions of tiny organisms. These include bacteria and yeasts. Many of these do not cause any problems. In fact, they may help fight disease.  Yeasts are a type of fungus. A type of yeast called Candida normally lives on your skin. It is also found on the membranes of your mouth and throat. Usually, this yeast grows only in small amounts and is harmless. But in some cases, Candida can grow out of control and cause thrush. Thrush is related to other kinds of Candida infections that can grow all over the body. Thrush refers to an infection of only the mouth and throat.  What causes thrush?  Thrush happens when something lets too much Candida grow inside your mouth and throat. Certain things that change the normal balance of organisms in the mouth can lead to thrush. One example is antibiotic medicine. This medicine may kill some of the normal bacteria in your mouth. Candida can then grow freely. People on antibiotics have an increased risk for thrush.  You have a higher risk for thrush if you:  · Wear dentures  · Are getting chemotherapy  · Are getting radiation therapy  · Have diabetes  · Have a transplanted organ  · Use corticosteroids  · Have a weakened  immune system, such as from AIDS  · Are an older adult  Symptoms of thrush  Some people with thrush do not have any symptoms. Symptoms of thrush can include:  · A dry, cottony feeling in your mouth  · Loss of taste  · Pain while eating or swallowing  · White or red patches inside your mouth or on the back of your throat  Diagnosing thrush  Your health care provider will ask about your medical history and your symptoms. He or she will look closely at your mouth and throat. White or red patches will be scraped with a tongue depressor. The sample will be sent to a lab to test. This test can usually confirm thrush.  If you have thrush, you may also have esophageal candidiasis. This is common in people who have HIV. Your health care provider may check for this condition with an upper endoscopy. This is a procedure to look at the esophagus. A tissue sample may be taken to test.  Treatment for thrush  It is important to treat thrush early. Untreated, it may turn into a more serious form of widespread infection. Thrush is usually treated with antifungal medicine. The medicine is put directly in your mouth and throat. You may be given a swish and swallow medicine or an antifungal lozenge.  In some cases, you may need an antifungal pill. This can remove Candida throughout your body. Or you may need medicine through an IV. These treatments depend on how severe your infection is, and what other health conditions you have.  If you are at high risk for thrush, you may need to keep taking oral antifungal medicine. This is to help prevent thrush in the future.  What happens if you dont get treated for thrush?  If untreated, the Candida may spread throughout your body. They may even enter your bloodstream. This can cause serious problems, such as organ failure and even death. Bloodstream infection may need to be treated with high doses of antifungal medicine through an IV.  Systemic infection is much more likely in people who are  very ill. It is also more common in those who have serious problems with their immune system. Additional risk factors for systemic infection in very ill people include:  · Central venous lines  · IV nutrition  · Broad-spectrum antibiotics  · Kidney failure  · Recent surgery  Preventing thrush  You may be able to help prevent some cases of thrush. Make sure to:  · Practice good oral hygiene. Try using a chlorhexidine mouthwash.  · Clean your dentures regularly as instructed. Make sure they fit you correctly.  · After using a corticosteroid inhaler, rinse out your mouth with water or mouthwash.  · Do not use broad-spectrum antibiotics, if possible.  · Get treated for health problems that increase your risk for thrush, such as diabetes.     When to call the health care provider  Call your health care provider right away if you have any of these:  · Cottony feeling in your mouth  · Loss of taste  · Pain while eating or swallowing  · White or red patches inside your mouth or on the back of your throat   Date Last Reviewed: 3/19/2015  © 2411-7407 [employer]. [address] [place] [address], [place] [address]. All rights reserved. This information is not intended as a substitute for professional medical care. Always follow your healthcare professional's instructions.

## 2020-07-02 ENCOUNTER — PATIENT OUTREACH (OUTPATIENT)
Dept: ADMINISTRATIVE | Facility: HOSPITAL | Age: 50
End: 2020-07-02

## 2020-07-07 ENCOUNTER — TELEPHONE (OUTPATIENT)
Dept: SMOKING CESSATION | Facility: CLINIC | Age: 50
End: 2020-07-07

## 2020-07-07 NOTE — TELEPHONE ENCOUNTER
I called patient to offer er an opportunity to reschedule her tobacco cessation intake appointment that she cancelled on 7/1/2020. I was unable to reach her today. I did leave my name and contact information for a return call.

## 2020-07-28 ENCOUNTER — TELEPHONE (OUTPATIENT)
Dept: FAMILY MEDICINE | Facility: CLINIC | Age: 50
End: 2020-07-28

## 2020-07-28 NOTE — TELEPHONE ENCOUNTER
----- Message from Hazel Sotelo sent at 7/28/2020  7:46 AM CDT -----  Regarding: advice  Contact: pt  Advice     Patient called she is asking who do you want her to have an Colonoscopy with. She   Received a letter from you stating it is time to have one.    Call back [phone]

## 2020-08-03 ENCOUNTER — PATIENT OUTREACH (OUTPATIENT)
Dept: ADMINISTRATIVE | Facility: OTHER | Age: 50
End: 2020-08-03

## 2020-08-03 NOTE — PROGRESS NOTES
Requested updates within Care Everywhere.  Patient's chart was reviewed for overdue BRONSON topics.  Immunizations reconciled.

## 2020-08-04 DIAGNOSIS — Z12.11 SCREENING FOR COLON CANCER: Primary | ICD-10-CM

## 2020-08-04 DIAGNOSIS — Z01.818 PREOP TESTING: ICD-10-CM

## 2020-08-05 ENCOUNTER — OFFICE VISIT (OUTPATIENT)
Dept: OTOLARYNGOLOGY | Facility: CLINIC | Age: 50
End: 2020-08-05
Payer: MEDICARE

## 2020-08-05 VITALS — BODY MASS INDEX: 22.65 KG/M2 | WEIGHT: 115.94 LBS | TEMPERATURE: 97 F

## 2020-08-05 DIAGNOSIS — J34.2 NASAL SEPTAL DEVIATION: ICD-10-CM

## 2020-08-05 DIAGNOSIS — I50.9 CONGESTIVE HEART FAILURE, UNSPECIFIED HF CHRONICITY, UNSPECIFIED HEART FAILURE TYPE: Primary | ICD-10-CM

## 2020-08-05 DIAGNOSIS — Z01.818 PRE-OP TESTING: ICD-10-CM

## 2020-08-05 DIAGNOSIS — J34.89 NASAL OBSTRUCTION: ICD-10-CM

## 2020-08-05 DIAGNOSIS — M95.0 SADDLE NOSE: ICD-10-CM

## 2020-08-05 DIAGNOSIS — M95.0 NASAL VALVE COLLAPSE: ICD-10-CM

## 2020-08-05 DIAGNOSIS — J34.3 HYPERTROPHY OF BOTH INFERIOR NASAL TURBINATES: ICD-10-CM

## 2020-08-05 DIAGNOSIS — J01.91 ACUTE RECURRENT SINUSITIS, UNSPECIFIED LOCATION: ICD-10-CM

## 2020-08-05 PROCEDURE — 99999 PR PBB SHADOW E&M-EST. PATIENT-LVL V: CPT | Mod: PBBFAC,,, | Performed by: OTOLARYNGOLOGY

## 2020-08-05 PROCEDURE — 99215 OFFICE O/P EST HI 40 MIN: CPT | Mod: PBBFAC,PO | Performed by: OTOLARYNGOLOGY

## 2020-08-05 PROCEDURE — 99214 OFFICE O/P EST MOD 30 MIN: CPT | Mod: S$PBB,,, | Performed by: OTOLARYNGOLOGY

## 2020-08-05 PROCEDURE — 99999 PR PBB SHADOW E&M-EST. PATIENT-LVL V: ICD-10-PCS | Mod: PBBFAC,,, | Performed by: OTOLARYNGOLOGY

## 2020-08-05 PROCEDURE — 99214 PR OFFICE/OUTPT VISIT, EST, LEVL IV, 30-39 MIN: ICD-10-PCS | Mod: S$PBB,,, | Performed by: OTOLARYNGOLOGY

## 2020-08-05 RX ORDER — IBUPROFEN 800 MG/1
800 TABLET ORAL 3 TIMES DAILY
COMMUNITY
Start: 2020-07-31 | End: 2023-02-19

## 2020-08-05 RX ORDER — GABAPENTIN 800 MG/1
800 TABLET ORAL 3 TIMES DAILY
COMMUNITY
Start: 2020-07-31 | End: 2023-02-19

## 2020-08-05 NOTE — PATIENT INSTRUCTIONS
Nasal Surgery: Septoplasty    Youre scheduled to have nasal surgery. The type of nasal surgery youre having is called septoplasty. Read on to learn more about what to expect during this surgery.During surgery, the surgeon may remove cartilage and bone to reshape the deviated septum. After surgery, there is more breathing space. Enough cartilage and bone remain to give the nose support.  What to expect during septoplasty  This surgery repairs a blockage inside the nose caused by a deviated septum. With a deviated septum, there is a problem with the wall that divides the nose into two chambers. A deviated septum may block air coming through one or both nostrils. This makes it harder for you to breathe through your nose. During septoplasty, the surgeon makes incisions inside the nose. Then the surgeon trims, reshapes, moves, or removes cartilage and sometimes bone from the septum.  Risks and possible complications  As with any surgery, nasal surgery has some risks. These include a slight risk of bleeding and infection. Your doctor will discuss any other risks and complications with you.   After septoplasty  After septoplasty, youll be taken to a recovery area or to your hospital room. Your experience may be as follows:  · You may have packing material inside your nose. This reduces bleeding and promotes healing. You may also have bandages (dressings) on the outside of your nose.  · Its normal to have some mucus and blood drain from your nose. Until packing is removed, you may have to breathe through your mouth.  · You may have some swelling or bruising around the eyelids if a rhinoplasty was also done.  · Expect some throat dryness and irritation.  · Pain medicine will be prescribed as needed. Dont take medicine that contains aspirin or ibuprofen. These can cause increased bleeding.  Follow-up care  Youll need to follow up with your doctor within a week after your surgery. Here is what to expect:  · Any  packing, splint, or dressings will probably be removed. You may feel slight discomfort and bleed a little when this is done.  · After the splint or packing is removed, youll most likely breathe better than you did before surgery.  · You may have minor numbness, pain, swelling, and a little stiffness under the tip of the nose.  · In a few days, the inside of your nose may swell and briefly block your breathing. Or a scab or crust may block breathing for a short time. Leave the scab alone. Your doctor can remove it. Using saline (irrigation or aerosol) regularly after surgery helps to reduce the amount of crusting at each visit.  · Contact your healthcare provider if you have any questions or concerns.  Date Last Reviewed: 11/1/2016 © 2000-2017 [employer]. [address] [place] [address], [place] [address]. All rights reserved. This information is not intended as a substitute for professional medical care. Always follow your healthcare professional's instructions.          Nasal Surgery: Turbinate Surgery     During surgery, bone or mucous membrane may be removed from enlarged turbinates.   Youre scheduled to have nasal surgery. The type of nasal surgery youre having is called turbinate surgery. Read on to learn more about what to expect during this surgery.  What are the turbinates?  The turbinates are located on the inside of each side of your nose. They are curved bony ridges that are lined with mucous membrane. Mucous membrane is thin tissue that also lines the insides of your sinuses and throat. It makes sticky mucus that helps clean the air in the nose of dust and other small particles. The turbinates and mucous membrane warm and moisten the air you breathe through your nose.  What to expect during turbinate surgery  This surgery fixes a blockage caused by enlarged turbinates. The surgeon makes cuts (incisions) inside the nose under the lower turbinate. The surgeon may use a laser to do this. He or she  may remove extra bone to make the turbinate smaller. Sometimes the surgeon also removes excess mucous membrane.  Risks and possible complications  As with any surgery, nasal surgery has some risks. These include a slight risk of bleeding and infection. Your doctor will discuss any other risks and complications with you.   After turbinate surgery  After turbinate surgery, youll be taken to a recovery area or to your hospital room. Your experience may be as follows:  · You may have packing or a plastic splint inside your nose if you had a septoplasty or sinus surgery along with the turbinate surgery.  · You may also have bandages (dressings) on the outside of your nose.  · Its normal to have some mucus and blood drain from your nose. Until packing is removed, you may have to breathe through your mouth.  · Expect some throat dryness and irritation. This is normal after general anesthesia or having a tube down your throat.  · Pain medicine will be prescribed as needed. Dont take medicine containing aspirin or ibuprofen. These can increase bleeding.  Follow-up  Youll need to follow up with your doctor within a week after your surgery. Here is what to expect:  · Any packing, splint, or dressings will probably be removed. You may feel slight discomfort and bleed a little when this is done.  · After the splint or packing is removed, youll most likely breathe better than you did before surgery.  · You may have minor numbness, pain, swelling, and a little stiffness under the tip of the nose.  · In a few days, the inside of your nose may swell and briefly block your breathing. Or a scab or crust may block breathing for a short time. Leave the scab alone. Your doctor can remove it. Using a saline solution in your nose can help reduce and remove crusts.  · Contact your healthcare provider if you have any questions, concerns, or unusual symptoms when you get home.  Date Last Reviewed: 11/1/2016  © 2874-1859 [employer]  [employer], [employer]. [address] [place] [address], [place] [address]. All rights reserved. This information is not intended as a substitute for professional medical care. Always follow your healthcare professional's instructions.

## 2020-08-05 NOTE — LETTER
August 6, 2020      Lea Chandler, [facility]  [address] [facility] [address]           [facility]  [address] [place] [address]  Phone: [phone]  Fax: [phone]          Patient: Alisson Eldridge   MR Number: [id number]   YOB: 1970   Date of Visit: 8/5/2020       Dear Lea Chandler:    Thank you for referring Alisson Eldridge to me for evaluation. Attached you will find relevant portions of my assessment and plan of care.    If you have questions, please do not hesitate to call me. I look forward to following Alisson Eldridge along with you.    Sincerely,    Surinder Bobby MD    Enclosure  CC:  No Recipients    If you would like to receive this communication electronically, please contact [email or web address] or [phone] to request more information on [employer] access.    For providers and/or their staff who would like to refer a patient to [facility], please contact us through our one-stop-shop provider referral line, [facility] , at [phone].    If you feel you have received this communication in error or would no longer like to receive these types of communications, please e-mail [email or web address]

## 2020-08-05 NOTE — PROGRESS NOTES
Subjective:       Patient ID: Alisson Eldridge is a 49 y.o. female.    Chief Complaint: Sinus Problem and Sore Throat    Alisson is here for sinus/nasal complaints. Symptoms have been present for years.  She reports nasal obstruction, worse in the evening. Worse with temperature change, and improved with sitting upright. She also has sore throat.  She has a history of nasal trauma > 15 years ago. Reports nasal issues worse following nasal surgery.   Post-nasal drainage: no  Pressure: yes  Congestion: yes, worse on the left  Decreased sense of smell: no  Therapies tried: Flonase and Astelin with some relief. Augmentin, ciprofloxacin and oral steroids.  She does use tobacco, uses < 1 ppd    History of asthma: yes, reports not as well controlled.   Pertinent surgical history: nasal surgery following trauma  Pertinent medical issues: migraine, psych history, CHF (mild) - no medications.   No recent Echo in system.       Social History     Tobacco Use   Smoking Status Current Every Day Smoker    Packs/day: 1.00    Types: Cigarettes   Smokeless Tobacco Never Used   Tobacco Comment    quit cold turkey twicce for 5 years with no difficulty.     Social History     Substance and Sexual Activity   Alcohol Use No        Review of Systems   Constitutional: Negative for activity change and appetite change.   Eyes: Negative for discharge.   Respiratory: Negative for difficulty breathing and wheezing   Cardiovascular: Negative for chest pain.   Gastrointestinal: Negative for abdominal distention and abdominal pain.   Endocrine: Negative for cold intolerance and heat intolerance.   Genitourinary: Negative for dysuria.   Musculoskeletal: Negative for gait problem and joint swelling.   Skin: Negative for color change and pallor.   Neurological: Negative for syncope and weakness.   Psychiatric/Behavioral: Negative for agitation and confusion.     Objective:        Constitutional:   She is oriented to person, place, and time. She  appears well-developed and well-nourished. She appears alert. She is active. Normal speech.      Head:  Normocephalic and atraumatic. Head is without TMJ tenderness. No scars. Salivary glands normal.  Facial strength is normal.      Ears:    Right Ear: No drainage or swelling. No middle ear effusion.   Left Ear: No drainage or swelling.  No middle ear effusion.     Nose:  Septal deviation (mod-sev) present. No mucosal edema, rhinorrhea or sinus tenderness. Turbinate hypertrophy.    Slight saddle nose with widened nasal bridge    Mouth/Throat  Oropharynx clear and moist without lesions or asymmetry, normal uvula midline and mirror exam normal. Normal dentition. No uvula swelling, lacerations or trismus. No oropharyngeal exudate. Tonsillar erythema, tonsillar exudate.      Neck:  Full range of motion with neck supple and no adenopathy. Thyroid tenderness is present. No tracheal deviation, no edema, no erythema, normal range of motion, no stridor, no crepitus and no neck rigidity present. No thyroid mass present.     Cardiovascular:   Intact distal pulses and normal pulses.      Pulmonary/Chest:   Effort normal and breath sounds normal. No stridor.     Psychiatric:   Her speech is normal and behavior is normal. Her mood appears not anxious. Her affect is not labile.     Neurological:   She is alert and oriented to person, place, and time. No sensory deficit.     Skin:   No abrasions, lacerations, lesions, or rashes. No abrasion and no bruising noted.         Tests / Results:  Personally reviewed the CT sinus from 06/12/2020  Single opacification of ethmoid bulla on the left. Patent OMC and SE rec. Small nonobstructive right maxillary mucous retention cyst.     Assessment:       1. Congestive heart failure, unspecified HF chronicity, unspecified heart failure type    2. Acute recurrent sinusitis, unspecified location    3. Nasal obstruction    4. Hypertrophy of both inferior nasal turbinates    5. Nasal septal deviation  "   6. Nasal valve collapse    7. Saddle nose          Plan:       Sinuses are relatively clear and noncontributory to symptoms  We discussed nasal airway surgery. In her case, this would involve a septoplasty, possible open approach with possible valve repair and turbinate reduction.    She has a documented history of heart failure. Patient reports no issues with this, but there is no recent Echo in system (she reports it was "done at [facility].") She needs clearance by PCP team and I have ordered an Echo. Additional pre-op workup can be completed as indicated.     I discussed at length the risk of nasal /  sinus surgery with the patient including, but not limited to, recurrence/persistence of symptoms, cosmetic change, bleeding, infection, tearing abnormality, septal perforation, tooth or cheek numbness, vision changes, orbital injury, CSF leak and changes in smell.  The patient had opportunity to ask questions and I answered all of them to their satisfaction.  We will proceed as planned.         "

## 2020-08-07 ENCOUNTER — TELEPHONE (OUTPATIENT)
Dept: GASTROENTEROLOGY | Facility: CLINIC | Age: 50
End: 2020-08-07

## 2020-08-07 NOTE — TELEPHONE ENCOUNTER
----- Message from Lizbeth Maurice sent at 8/7/2020  9:31 AM CDT -----  Contact: Pt at [phone]  Type: Needs Medical Advice  Who Called:  Pt  Best Call Back Number: [phone]  Additional Information: Patient is calling to reschedule her procedure.Please call back and advise.

## 2020-08-07 NOTE — TELEPHONE ENCOUNTER
Call placed to Ms. Eldridge. She stated that she would like to cancel her c-scope as she is scheduled to have her deviated septum fixed the following week. She stated she would call back to reschedule. No further issues noted.

## 2020-08-11 ENCOUNTER — TELEPHONE (OUTPATIENT)
Dept: FAMILY MEDICINE | Facility: CLINIC | Age: 50
End: 2020-08-11

## 2020-08-11 NOTE — TELEPHONE ENCOUNTER
Spoke with the pts preferred pharmacy and the pharmacy tech states that they are currently out of pneumonia vaccine and shingles. He states that it is to early for anyone to receive the flu vaccine.     Spoke with pt and she states that she will call around to see if any other pharmacy's have the immunizations she needs.             ----- Message from Patricio Barba sent at 8/11/2020  8:11 AM CDT -----  Regarding: pt  Type: Needs Medical Advice    Who Called:  pt  Pharmacy name and phone #:    [id number] - [place], [place] - [address] AT [facility]  & [address]  [address]  [facility] [address]  Phone: [phone] Fax: [phone]    Best Call Back Number: [phone]     Additional Information: pt is asking for Rx for Shingles and Pneumonia to be sent to pharmacy above as long as they DONT contain egg. She is allergic to egg. Please call to discuss.

## 2020-08-13 ENCOUNTER — TELEPHONE (OUTPATIENT)
Dept: OTOLARYNGOLOGY | Facility: CLINIC | Age: 50
End: 2020-08-13

## 2020-08-13 NOTE — TELEPHONE ENCOUNTER
----- Message from Ann Marie Shahid sent at 8/13/2020  1:20 PM CDT -----  Regarding: advice  Contact: patient  Type: Needs Medical Advice  Who Called:  self  Symptoms (please be specific):    How long has patient had these symptoms:    Pharmacy name and phone #:    Best Call Back Number: [phone] (home)   Additional Information: Patient is calling regarding her upcoming surgery on 09/04/20.  She has a few questions. Please call patient. Thanks!

## 2020-08-13 NOTE — TELEPHONE ENCOUNTER
Patient had questions about upcoming surgery. Educated patient that she can't have anything to eat or drink after midnight and surgery center will call her the day before surgery to confirm and answer any preop questions.

## 2020-08-20 ENCOUNTER — HOSPITAL ENCOUNTER (OUTPATIENT)
Dept: CARDIOLOGY | Facility: HOSPITAL | Age: 50
Discharge: HOME | End: 2020-08-20
Attending: OTOLARYNGOLOGY
Payer: MEDICARE

## 2020-08-20 VITALS — HEIGHT: 60 IN | BODY MASS INDEX: 22.58 KG/M2 | WEIGHT: 115 LBS

## 2020-08-20 DIAGNOSIS — I50.9 CONGESTIVE HEART FAILURE, UNSPECIFIED HF CHRONICITY, UNSPECIFIED HEART FAILURE TYPE: ICD-10-CM

## 2020-08-20 PROCEDURE — 93356 ECHO (CUPID ONLY): ICD-10-PCS | Mod: ,,, | Performed by: INTERNAL MEDICINE

## 2020-08-20 PROCEDURE — 93306 ECHO (CUPID ONLY): ICD-10-PCS | Mod: 26,,, | Performed by: INTERNAL MEDICINE

## 2020-08-20 PROCEDURE — 93306 TTE W/DOPPLER COMPLETE: CPT | Mod: 26,,, | Performed by: INTERNAL MEDICINE

## 2020-08-20 PROCEDURE — 93356 MYOCRD STRAIN IMG SPCKL TRCK: CPT | Mod: ,,, | Performed by: INTERNAL MEDICINE

## 2020-08-20 PROCEDURE — 93306 TTE W/DOPPLER COMPLETE: CPT

## 2020-08-21 LAB
AORTIC ROOT ANNULUS: 2.52 CM
AORTIC VALVE CUSP SEPERATION: 1.82 CM
AV INDEX (PROSTH): 0.86
AV MEAN GRADIENT: 4 MMHG
AV PEAK GRADIENT: 7 MMHG
AV VALVE AREA: 1.87 CM2
AV VELOCITY RATIO: 0.8
BSA FOR ECHO PROCEDURE: 1.49 M2
CV ECHO LV RWT: 0.42 CM
DOP CALC AO PEAK VEL: 1.33 M/S
DOP CALC AO VTI: 31.45 CM
DOP CALC LVOT AREA: 2.2 CM2
DOP CALC LVOT DIAMETER: 1.67 CM
DOP CALC LVOT PEAK VEL: 1.07 M/S
DOP CALC LVOT STROKE VOLUME: 58.94 CM3
DOP CALCLVOT PEAK VEL VTI: 26.92 CM
E WAVE DECELERATION TIME: 244.75 MSEC
E/A RATIO: 1.15
E/E' RATIO: 9.82 M/S
ECHO LV POSTERIOR WALL: 0.9 CM (ref 0.6–1.1)
FRACTIONAL SHORTENING: 33 % (ref 28–44)
INTERVENTRICULAR SEPTUM: 0.88 CM (ref 0.6–1.1)
IVRT: 83.73 MSEC
LA MAJOR: 3.83 CM
LA MINOR: 2.11 CM
LEFT ATRIUM SIZE: 3.56 CM
LEFT INTERNAL DIMENSION IN SYSTOLE: 2.84 CM (ref 2.1–4)
LEFT VENTRICLE DIASTOLIC VOLUME INDEX: 55.36 ML/M2
LEFT VENTRICLE DIASTOLIC VOLUME: 81.68 ML
LEFT VENTRICLE MASS INDEX: 81 G/M2
LEFT VENTRICLE SYSTOLIC VOLUME INDEX: 20.7 ML/M2
LEFT VENTRICLE SYSTOLIC VOLUME: 30.61 ML
LEFT VENTRICULAR INTERNAL DIMENSION IN DIASTOLE: 4.27 CM (ref 3.5–6)
LEFT VENTRICULAR MASS: 120.07 G
LV LATERAL E/E' RATIO: 9 M/S
LV SEPTAL E/E' RATIO: 10.8 M/S
MV PEAK A VEL: 0.94 M/S
MV PEAK E VEL: 1.08 M/S
MV PEAK GRADIENT: 101 MMHG
MV STENOSIS PRESSURE HALF TIME: 66.84 MS
MV VALVE AREA P 1/2 METHOD: 3.29 CM2
PISA MRMAX VEL: 0.05 M/S
PISA TR MAX VEL: 2.05 M/S
PV MEAN GRADIENT: 2 MMHG
PV PEAK VELOCITY: 0.94 CM/S
RA MAJOR: 3.49 CM
RA PRESSURE: 3 MMHG
RA WIDTH: 2.3 CM
RIGHT VENTRICULAR END-DIASTOLIC DIMENSION: 2.26 CM
TDI LATERAL: 0.12 M/S
TDI SEPTAL: 0.1 M/S
TDI: 0.11 M/S
TR MAX PG: 17 MMHG
TRICUSPID ANNULAR PLANE SYSTOLIC EXCURSION: 2.79 CM
TV REST PULMONARY ARTERY PRESSURE: 20 MMHG

## 2020-09-01 ENCOUNTER — LAB VISIT (OUTPATIENT)
Dept: FAMILY MEDICINE | Facility: CLINIC | Age: 50
End: 2020-09-01
Payer: MEDICARE

## 2020-09-01 ENCOUNTER — TELEPHONE (OUTPATIENT)
Dept: FAMILY MEDICINE | Facility: CLINIC | Age: 50
End: 2020-09-01

## 2020-09-01 DIAGNOSIS — Z01.818 PRE-OP TESTING: ICD-10-CM

## 2020-09-01 PROCEDURE — U0003 INFECTIOUS AGENT DETECTION BY NUCLEIC ACID (DNA OR RNA); SEVERE ACUTE RESPIRATORY SYNDROME CORONAVIRUS 2 (SARS-COV-2) (CORONAVIRUS DISEASE [COVID-19]), AMPLIFIED PROBE TECHNIQUE, MAKING USE OF HIGH THROUGHPUT TECHNOLOGIES AS DESCRIBED BY CMS-2020-01-R: HCPCS

## 2020-09-01 NOTE — TELEPHONE ENCOUNTER
----- Message from Brittany Mi sent at 9/1/2020  1:19 PM CDT -----  Regarding: concerns/problems  Contact: pt [phone]  Pt is calling to speak with the dr about her having problems and concerns about her bladder. Pt states it is taking longer than ever to urinate and bladder still feels full. Please contact pt with these problems

## 2020-09-01 NOTE — TELEPHONE ENCOUNTER
Pt c/o bladder problems/concerns. Pt OV scheduled for Thursday, 09/03/2020 at 1000 with KAROL Luna. Pt verbalized understanding.

## 2020-09-02 ENCOUNTER — TELEPHONE (OUTPATIENT)
Dept: OTOLARYNGOLOGY | Facility: CLINIC | Age: 50
End: 2020-09-02

## 2020-09-02 DIAGNOSIS — Z01.818 PRE-OP TESTING: ICD-10-CM

## 2020-09-02 LAB — SARS-COV-2 RNA RESP QL NAA+PROBE: NOT DETECTED

## 2020-09-02 NOTE — TELEPHONE ENCOUNTER
----- Message from Catalina De Leon sent at 9/2/2020  2:58 PM CDT -----  Regarding: Returning call  Contact: Patient  Type:  Patient Returning Call    Who Called:  Patient  Who Left Message for Patient:  Natasha  Does the patient know what this is regarding?:  rescheduling procedure  Best Call Back Number:  [phone]  Additional Information:

## 2020-09-22 ENCOUNTER — LAB VISIT (OUTPATIENT)
Dept: FAMILY MEDICINE | Facility: CLINIC | Age: 50
End: 2020-09-22
Payer: MEDICARE

## 2020-09-22 DIAGNOSIS — Z01.818 PRE-OP TESTING: ICD-10-CM

## 2020-09-22 PROCEDURE — U0003 INFECTIOUS AGENT DETECTION BY NUCLEIC ACID (DNA OR RNA); SEVERE ACUTE RESPIRATORY SYNDROME CORONAVIRUS 2 (SARS-COV-2) (CORONAVIRUS DISEASE [COVID-19]), AMPLIFIED PROBE TECHNIQUE, MAKING USE OF HIGH THROUGHPUT TECHNOLOGIES AS DESCRIBED BY CMS-2020-01-R: HCPCS

## 2020-09-23 LAB — SARS-COV-2 RNA RESP QL NAA+PROBE: NOT DETECTED

## 2020-09-24 ENCOUNTER — TELEPHONE (OUTPATIENT)
Dept: OTOLARYNGOLOGY | Facility: CLINIC | Age: 50
End: 2020-09-24

## 2020-09-24 ENCOUNTER — ANESTHESIA EVENT (OUTPATIENT)
Dept: SURGERY | Facility: HOSPITAL | Age: 50
End: 2020-09-24
Payer: MEDICARE

## 2020-09-24 NOTE — TELEPHONE ENCOUNTER
----- Message from Lyla Crandall MA sent at 9/24/2020  9:26 AM CDT -----  Regarding: [facility]/ Call back  Pt is requesting a call back for more details on PROCS on tomorrow  Call Back # [phone]

## 2020-09-24 NOTE — TELEPHONE ENCOUNTER
Advised pt that she will receive her pre-op call from the nurse today before 6pm with instructions and arrival time. Pt verbalized understanding.

## 2020-09-25 ENCOUNTER — HOSPITAL ENCOUNTER (OUTPATIENT)
Facility: HOSPITAL | Age: 50
Discharge: HOME | End: 2020-09-25
Attending: OTOLARYNGOLOGY | Admitting: OTOLARYNGOLOGY
Payer: MEDICARE

## 2020-09-25 ENCOUNTER — ANESTHESIA (OUTPATIENT)
Dept: SURGERY | Facility: HOSPITAL | Age: 50
End: 2020-09-25
Payer: MEDICARE

## 2020-09-25 VITALS
BODY MASS INDEX: 22.19 KG/M2 | HEIGHT: 60 IN | HEART RATE: 78 BPM | OXYGEN SATURATION: 98 % | WEIGHT: 113 LBS | DIASTOLIC BLOOD PRESSURE: 74 MMHG | RESPIRATION RATE: 16 BRPM | TEMPERATURE: 97 F | SYSTOLIC BLOOD PRESSURE: 120 MMHG

## 2020-09-25 DIAGNOSIS — J34.3 HYPERTROPHY OF BOTH INFERIOR NASAL TURBINATES: ICD-10-CM

## 2020-09-25 DIAGNOSIS — Z72.0 TOBACCO ABUSE: ICD-10-CM

## 2020-09-25 DIAGNOSIS — G89.29 CHRONIC BILATERAL LOW BACK PAIN WITHOUT SCIATICA: ICD-10-CM

## 2020-09-25 DIAGNOSIS — J34.89 NASAL VALVE STENOSIS: ICD-10-CM

## 2020-09-25 DIAGNOSIS — J34.89 NASAL OBSTRUCTION: Primary | ICD-10-CM

## 2020-09-25 DIAGNOSIS — J34.2 NASAL SEPTAL DEVIATION: ICD-10-CM

## 2020-09-25 DIAGNOSIS — M54.50 CHRONIC BILATERAL LOW BACK PAIN WITHOUT SCIATICA: ICD-10-CM

## 2020-09-25 PROCEDURE — 37000009 HC ANESTHESIA EA ADD 15 MINS: Mod: PO | Performed by: OTOLARYNGOLOGY

## 2020-09-25 PROCEDURE — 25000003 PHARM REV CODE 250: Mod: PO | Performed by: NURSE ANESTHETIST, CERTIFIED REGISTERED

## 2020-09-25 PROCEDURE — 71000015 HC POSTOP RECOV 1ST HR: Mod: PO | Performed by: OTOLARYNGOLOGY

## 2020-09-25 PROCEDURE — 63600175 PHARM REV CODE 636 W HCPCS: Mod: PO | Performed by: ANESTHESIOLOGY

## 2020-09-25 PROCEDURE — 37000008 HC ANESTHESIA 1ST 15 MINUTES: Mod: PO | Performed by: OTOLARYNGOLOGY

## 2020-09-25 PROCEDURE — 36000706: Mod: PO | Performed by: OTOLARYNGOLOGY

## 2020-09-25 PROCEDURE — 25000003 PHARM REV CODE 250: Mod: PO | Performed by: OTOLARYNGOLOGY

## 2020-09-25 PROCEDURE — 27201423 OPTIME MED/SURG SUP & DEVICES STERILE SUPPLY: Mod: PO | Performed by: OTOLARYNGOLOGY

## 2020-09-25 PROCEDURE — 63600175 PHARM REV CODE 636 W HCPCS: Mod: PO | Performed by: OTOLARYNGOLOGY

## 2020-09-25 PROCEDURE — D9220A PRA ANESTHESIA: Mod: ANES,,, | Performed by: ANESTHESIOLOGY

## 2020-09-25 PROCEDURE — D9220A PRA ANESTHESIA: Mod: CRNA,,, | Performed by: NURSE ANESTHETIST, CERTIFIED REGISTERED

## 2020-09-25 PROCEDURE — 30140 PR EXCISION TURBINATE,SUBMUCOUS: ICD-10-PCS | Mod: 50,51,, | Performed by: OTOLARYNGOLOGY

## 2020-09-25 PROCEDURE — 63600175 PHARM REV CODE 636 W HCPCS: Mod: PO | Performed by: NURSE ANESTHETIST, CERTIFIED REGISTERED

## 2020-09-25 PROCEDURE — 71000033 HC RECOVERY, INTIAL HOUR: Mod: PO | Performed by: OTOLARYNGOLOGY

## 2020-09-25 PROCEDURE — 30520 REPAIR OF NASAL SEPTUM: CPT | Mod: ,,, | Performed by: OTOLARYNGOLOGY

## 2020-09-25 PROCEDURE — D9220A PRA ANESTHESIA: ICD-10-PCS | Mod: CRNA,,, | Performed by: NURSE ANESTHETIST, CERTIFIED REGISTERED

## 2020-09-25 PROCEDURE — 30140 RESECT INFERIOR TURBINATE: CPT | Mod: 50,51,, | Performed by: OTOLARYNGOLOGY

## 2020-09-25 PROCEDURE — 30520 PR REPAIR, NASAL SEPTUM: ICD-10-PCS | Mod: ,,, | Performed by: OTOLARYNGOLOGY

## 2020-09-25 PROCEDURE — 36000707: Mod: PO | Performed by: OTOLARYNGOLOGY

## 2020-09-25 PROCEDURE — D9220A PRA ANESTHESIA: ICD-10-PCS | Mod: ANES,,, | Performed by: ANESTHESIOLOGY

## 2020-09-25 RX ORDER — HYDROMORPHONE HYDROCHLORIDE 2 MG/ML
0.2 INJECTION, SOLUTION INTRAMUSCULAR; INTRAVENOUS; SUBCUTANEOUS EVERY 5 MIN PRN
Status: DISCONTINUED | OUTPATIENT
Start: 2020-09-25 | End: 2020-09-25 | Stop reason: HOSPADM

## 2020-09-25 RX ORDER — EPHEDRINE SULFATE 50 MG/ML
INJECTION, SOLUTION INTRAVENOUS
Status: DISCONTINUED | OUTPATIENT
Start: 2020-09-25 | End: 2020-09-25

## 2020-09-25 RX ORDER — SODIUM CHLORIDE, SODIUM LACTATE, POTASSIUM CHLORIDE, CALCIUM CHLORIDE 600; 310; 30; 20 MG/100ML; MG/100ML; MG/100ML; MG/100ML
INJECTION, SOLUTION INTRAVENOUS CONTINUOUS
Status: DISCONTINUED | OUTPATIENT
Start: 2020-09-25 | End: 2023-02-19

## 2020-09-25 RX ORDER — ROCURONIUM BROMIDE 10 MG/ML
INJECTION, SOLUTION INTRAVENOUS
Status: DISCONTINUED | OUTPATIENT
Start: 2020-09-25 | End: 2020-09-25

## 2020-09-25 RX ORDER — KETAMINE HYDROCHLORIDE 100 MG/ML
INJECTION, SOLUTION INTRAMUSCULAR; INTRAVENOUS
Status: DISCONTINUED | OUTPATIENT
Start: 2020-09-25 | End: 2020-09-25

## 2020-09-25 RX ORDER — NEOSTIGMINE METHYLSULFATE 1 MG/ML
INJECTION, SOLUTION INTRAVENOUS
Status: DISCONTINUED | OUTPATIENT
Start: 2020-09-25 | End: 2020-09-25

## 2020-09-25 RX ORDER — EPINEPHRINE 1 MG/ML
INJECTION INTRAMUSCULAR; INTRAVENOUS; SUBCUTANEOUS
Status: DISCONTINUED | OUTPATIENT
Start: 2020-09-25 | End: 2020-09-25 | Stop reason: HOSPADM

## 2020-09-25 RX ORDER — ONDANSETRON 2 MG/ML
INJECTION INTRAMUSCULAR; INTRAVENOUS
Status: DISCONTINUED | OUTPATIENT
Start: 2020-09-25 | End: 2020-09-25

## 2020-09-25 RX ORDER — LIDOCAINE HYDROCHLORIDE 10 MG/ML
1 INJECTION, SOLUTION EPIDURAL; INFILTRATION; INTRACAUDAL; PERINEURAL ONCE
Status: DISCONTINUED | OUTPATIENT
Start: 2020-09-25 | End: 2023-02-19

## 2020-09-25 RX ORDER — FENTANYL CITRATE 50 UG/ML
INJECTION, SOLUTION INTRAMUSCULAR; INTRAVENOUS
Status: DISCONTINUED | OUTPATIENT
Start: 2020-09-25 | End: 2020-09-25

## 2020-09-25 RX ORDER — LIDOCAINE HYDROCHLORIDE 20 MG/ML
INJECTION INTRAVENOUS
Status: DISCONTINUED | OUTPATIENT
Start: 2020-09-25 | End: 2020-09-25

## 2020-09-25 RX ORDER — OXYCODONE HYDROCHLORIDE 5 MG/1
5 TABLET ORAL
Status: DISCONTINUED | OUTPATIENT
Start: 2020-09-25 | End: 2020-09-25 | Stop reason: HOSPADM

## 2020-09-25 RX ORDER — ONDANSETRON 4 MG/1
4 TABLET, ORALLY DISINTEGRATING ORAL EVERY 6 HOURS PRN
Qty: 10 TABLET | Refills: 0 | Status: ON HOLD | OUTPATIENT
Start: 2020-09-25 | End: 2021-03-10

## 2020-09-25 RX ORDER — LIDOCAINE HYDROCHLORIDE AND EPINEPHRINE 10; 10 MG/ML; UG/ML
INJECTION, SOLUTION INFILTRATION; PERINEURAL
Status: DISCONTINUED | OUTPATIENT
Start: 2020-09-25 | End: 2020-09-25 | Stop reason: HOSPADM

## 2020-09-25 RX ORDER — MIDAZOLAM HYDROCHLORIDE 1 MG/ML
INJECTION, SOLUTION INTRAMUSCULAR; INTRAVENOUS
Status: DISCONTINUED | OUTPATIENT
Start: 2020-09-25 | End: 2020-09-25

## 2020-09-25 RX ORDER — HYDROCODONE BITARTRATE AND ACETAMINOPHEN 5; 325 MG/1; MG/1
1 TABLET ORAL EVERY 6 HOURS PRN
Qty: 12 TABLET | Refills: 0 | Status: ON HOLD | OUTPATIENT
Start: 2020-09-25 | End: 2021-03-10

## 2020-09-25 RX ORDER — SODIUM CHLORIDE 0.9 G/100ML
IRRIGANT IRRIGATION
Status: DISCONTINUED | OUTPATIENT
Start: 2020-09-25 | End: 2020-09-25 | Stop reason: HOSPADM

## 2020-09-25 RX ORDER — FENTANYL CITRATE 50 UG/ML
25 INJECTION, SOLUTION INTRAMUSCULAR; INTRAVENOUS EVERY 5 MIN PRN
Status: DISCONTINUED | OUTPATIENT
Start: 2020-09-25 | End: 2020-09-25 | Stop reason: HOSPADM

## 2020-09-25 RX ADMIN — SODIUM CHLORIDE, SODIUM LACTATE, POTASSIUM CHLORIDE, AND CALCIUM CHLORIDE: .6; .31; .03; .02 INJECTION, SOLUTION INTRAVENOUS at 09:09

## 2020-09-25 RX ADMIN — EPHEDRINE SULFATE 5 MG: 50 INJECTION INTRAVENOUS at 10:09

## 2020-09-25 RX ADMIN — FENTANYL CITRATE 50 MCG: 50 INJECTION, SOLUTION INTRAMUSCULAR; INTRAVENOUS at 09:09

## 2020-09-25 RX ADMIN — ROCURONIUM BROMIDE 30 MG: 10 INJECTION, SOLUTION INTRAVENOUS at 09:09

## 2020-09-25 RX ADMIN — KETAMINE HYDROCHLORIDE 30 MG: 100 INJECTION, SOLUTION, CONCENTRATE INTRAMUSCULAR; INTRAVENOUS at 09:09

## 2020-09-25 RX ADMIN — LIDOCAINE HYDROCHLORIDE 150 MG: 20 INJECTION, SOLUTION INTRAVENOUS at 09:09

## 2020-09-25 RX ADMIN — NEOSTIGMINE METHYLSULFATE 4 MG: 1 INJECTION INTRAVENOUS at 10:09

## 2020-09-25 RX ADMIN — MIDAZOLAM 2 MG: 1 INJECTION INTRAMUSCULAR; INTRAVENOUS at 09:09

## 2020-09-25 RX ADMIN — ONDANSETRON 4 MG: 2 INJECTION, SOLUTION INTRAMUSCULAR; INTRAVENOUS at 10:09

## 2020-09-25 RX ADMIN — SODIUM CHLORIDE, SODIUM LACTATE, POTASSIUM CHLORIDE, AND CALCIUM CHLORIDE: .6; .31; .03; .02 INJECTION, SOLUTION INTRAVENOUS at 10:09

## 2020-09-25 RX ADMIN — ROCURONIUM BROMIDE 5 MG: 10 INJECTION, SOLUTION INTRAVENOUS at 09:09

## 2020-09-25 RX ADMIN — EPHEDRINE SULFATE 10 MG: 50 INJECTION INTRAVENOUS at 09:09

## 2020-09-25 RX ADMIN — KETAMINE HYDROCHLORIDE 10 MG: 100 INJECTION, SOLUTION, CONCENTRATE INTRAMUSCULAR; INTRAVENOUS at 10:09

## 2020-09-25 NOTE — OP NOTE
09/25/2020     Name: Alisson Eldridge   MRN: [id number]   YOB: 1970     Pre-procedure diagnoses:  1. Nasal obstruction    2. Nasal septal deviation    3. Hypertrophy of both inferior nasal turbinates    4. Nasal valve stenosis    5. Tobacco abuse    6. Chronic bilateral low back pain without sciatica         Post-procedure diagnoses:  1. Nasal obstruction    2. Nasal septal deviation    3. Hypertrophy of both inferior nasal turbinates    4. Nasal valve stenosis    5. Tobacco abuse    6. Chronic bilateral low back pain without sciatica          Procedures performed  1. Septoplasty  2. Submucous Resection of Inferior Turbinates with Outfracture    Surgeon: Surinder Bobby    Assistants: None    Anesthesia: General, Endotracheal    Findings:  1. Severe right sided septal deviation  2. Turbinate hypertrophy, reduced as below    Specimens:  1. None    Complications: None apparent    Blood Loss: 50 cc    Disposition: PACU    Indications:  Alisson Eldridge is a 50 y.o. female who was seen in clinic for evaluation of nasal obstruction. Based on clinical assessment, the following procedures were discussed as an option for treatment. After risks, benefits, and alternatives were discussed the patient decided to proceed. Specific risks that were discussed with the patient included anesthesia risks, scarring, recurrence, bleeding, persistence of disease, change in smell, change in / loss of vision, CSF leak / meningitis, septal perforation.    Procedure in detail:  The patient was seen in the pre-operative holding area, and consent was signed. They were wheeled into the operating room and placed supine on the table. Anesthesia was induced via endotracheal tube. Pledgets soaked in Epinephrine 1:10,000 were used for decongestion, taking care to monitor vitals. 1% Lidocaine with 1:100,000 epinephrine was used to inject the septum and turbinate head. The patient was prepped and draped in the usual fashion.       SEPTOPLASTY: A right sided kade transfixion incision was made and I dissected into the avascular subperichondrial plane. The mucoperichondrial flap was elevated beyond the bony-cartilaginous junction. I then made a cartilaginous incision, preserving > 1 cm of L-strut for nasal support. I raised the contralateral mucoperichondrial flap and isolated the septum. I then removed deviated portions of the cartilaginous and bony septum. Care was taken to be mindful of the nasal floor and skull base. Once all of the deviated portions were removed, the flaps were irrigated to remove loose bony chips. There was  A small anterior right sided rent in the mucoperichondrial flap noted, with no contralateral rent at that level. A 4-0 plaint gut was used to close the kade transfixion incision and then a whipstitch was thrown to re approximate the flaps.    INFERIOR TURBINATE REDUCTION: I then proceeded with submucous resection of inferior turbinates. 1% Lidocaine with 1:100,000 epinephrine was injected into the head of the right inferior turbinate. A small incision was made at the head of the inferior turbinate. I dissected in a submucosal plane along the inferior annita. The 3.5 mm microdebrider was used to resect bone and soft tissue, preserving the overlying mucosa. The turbinate was then gently outfractured. Hemostasis was achieved. The same procedure was performed on the left.     This marked the end of the procedure. Chavez splints were placed bilaterally and secured with a Nylon stitch. The patient was turned back over to the Anesthesia team.  They were awoken and transported back to the PACU in stable condition. Counts were correct. I performed the entire procedure.

## 2020-09-25 NOTE — TRANSFER OF CARE
Anesthesia Transfer of Care Note    Patient: Alisson Eldridge    Procedure(s) Performed: Procedure(s) (LRB):  SEPTOPLASTY, NASAL (Bilateral)  REDUCTION, NASAL TURBINATE (Bilateral)    Patient location: PACU    Anesthesia Type: general    Transport from OR: Transported from OR on room air with adequate spontaneous ventilation    Post pain: adequate analgesia    Post assessment: no apparent anesthetic complications and tolerated procedure well    Post vital signs: stable    Level of consciousness: responds to stimulation    Nausea/Vomiting: no nausea/vomiting    Complications: none    Transfer of care protocol was followed      Last vitals:   Visit Vitals  /70 (BP Location: Right arm, Patient Position: Sitting)   Pulse 81   Temp 36.9 °C (98.4 °F) (Skin)   Resp 16   Ht 5' (1.524 m)   Wt 51.3 kg (113 lb)   LMP 10/08/2018   SpO2 98%   Breastfeeding No   BMI 22.07 kg/m²

## 2020-09-25 NOTE — ANESTHESIA PREPROCEDURE EVALUATION
09/25/2020  Alisson Eldridge is a 50 y.o., female.    Anesthesia Evaluation    I have reviewed the Patient Summary Reports.    I have reviewed the Nursing Notes. I have reviewed the NPO Status.   I have reviewed the Medications.     Review of Systems  Cardiovascular:   ECG has been reviewed. · Normal left ventricular systolic function. The estimated ejection fraction is 63%.  · Normal LV diastolic function.  · The left ventricular global longitudinal strain is -21%. normal  · Normal right ventricular systolic function.  · Mild mitral regurgitation.  · Normal central venous pressure (3 mmHg).  · The estimated PA systolic pressure is 20 mmHg.  · No wall motion abnormalities.   Pulmonary:   Asthma Sinus drainage   Hepatic/GI:   GERD    Musculoskeletal:  Spine Disorders: lumbar Chronic Pain    OB/GYN/PEDS:  BOB   Neurological:   Headaches Norco 10's qid  Chronic Pain Syndrome   Endocrine:  Endocrine Normal    Psych:   Psychiatric History anxiety          Physical Exam  General:  Well nourished    Airway/Jaw/Neck:  Airway Findings: Mouth Opening: Normal Tongue: Normal  General Airway Assessment: Adult  Mallampati: II  Improves to II with phonation.      Dental:  Dental Findings: In tact, Edentulous   Chest/Lungs:  Chest/Lungs Findings: Clear to auscultation, Normal Respiratory Rate     Heart/Vascular:  Heart Findings: Rate: Normal  Rhythm: Regular Rhythm  Sounds: Normal        Mental Status:  Mental Status Findings:  Cooperative, Alert and Oriented         Anesthesia Plan  Type of Anesthesia, risks & benefits discussed:  Anesthesia Type:  general  Patient's Preference:   Intra-op Monitoring Plan: standard ASA monitors  Intra-op Monitoring Plan Comments:   Post Op Pain Control Plan: multimodal analgesia  Post Op Pain Control Plan Comments:   Induction:   IV and Inhalation  Beta Blocker:  Patient is not currently  on a Beta-Blocker (No further documentation required).       Informed Consent: Patient understands risks and agrees with Anesthesia plan.  Questions answered. Anesthesia consent signed with patient.  ASA Score: 2     Day of Surgery Review of History & Physical:            Ready For Surgery From Anesthesia Perspective.

## 2020-09-25 NOTE — H&P
Subjective:       Patient ID: Alisson Eldridge is a 49 y.o. female.     Chief Complaint: Sinus Problem and Sore Throat     Alisson is here for sinus/nasal complaints. Symptoms have been present for years.  She reports nasal obstruction, worse in the evening. Worse with temperature change, and improved with sitting upright. She also has sore throat.  She has a history of nasal trauma > 15 years ago. Reports nasal issues worse following nasal surgery.   Post-nasal drainage: no  Pressure: yes  Congestion: yes, worse on the left  Decreased sense of smell: no  Therapies tried: Flonase and Astelin with some relief. Augmentin, ciprofloxacin and oral steroids.  She does use tobacco, uses < 1 ppd     History of asthma: yes, reports not as well controlled.   Pertinent surgical history: nasal surgery following trauma  Pertinent medical issues: migraine, psych history, CHF (mild) - no medications.   No recent Echo in system.         Social History           Tobacco Use   Smoking Status Current Every Day Smoker    Packs/day: 1.00    Types: Cigarettes   Smokeless Tobacco Never Used   Tobacco Comment     quit cold turkey twicce for 5 years with no difficulty.      Social History          Substance and Sexual Activity   Alcohol Use No         Review of Systems   Constitutional: Negative for activity change and appetite change.   Eyes: Negative for discharge.   Respiratory: Negative for difficulty breathing and wheezing   Cardiovascular: Negative for chest pain.   Gastrointestinal: Negative for abdominal distention and abdominal pain.   Endocrine: Negative for cold intolerance and heat intolerance.   Genitourinary: Negative for dysuria.   Musculoskeletal: Negative for gait problem and joint swelling.   Skin: Negative for color change and pallor.   Neurological: Negative for syncope and weakness.   Psychiatric/Behavioral: Negative for agitation and confusion.      Objective:      Constitutional:   She is oriented to person, place,  and time. She appears well-developed and well-nourished. She appears alert. She is active. Normal speech.       Head:  Normocephalic and atraumatic. Head is without TMJ tenderness. No scars. Salivary glands normal.  Facial strength is normal.       Ears:    Right Ear: No drainage or swelling. No middle ear effusion.   Left Ear: No drainage or swelling.  No middle ear effusion.      Nose:  Septal deviation (mod-sev) present. No mucosal edema, rhinorrhea or sinus tenderness. Turbinate hypertrophy.    Slight saddle nose with widened nasal bridge     Mouth/Throat  Oropharynx clear and moist without lesions or asymmetry, normal uvula midline and mirror exam normal. Normal dentition. No uvula swelling, lacerations or trismus. No oropharyngeal exudate. Tonsillar erythema, tonsillar exudate.       Neck:  Full range of motion with neck supple and no adenopathy. Thyroid tenderness is present. No tracheal deviation, no edema, no erythema, normal range of motion, no stridor, no crepitus and no neck rigidity present. No thyroid mass present.      Cardiovascular:   Intact distal pulses and normal pulses.       Pulmonary/Chest:   Effort normal and breath sounds normal. No stridor.      Psychiatric:   Her speech is normal and behavior is normal. Her mood appears not anxious. Her affect is not labile.      Neurological:   She is alert and oriented to person, place, and time. No sensory deficit.      Skin:   No abrasions, lacerations, lesions, or rashes. No abrasion and no bruising noted.          Tests / Results:  None     Assessment:       1. Congestive heart failure, unspecified HF chronicity, unspecified heart failure type    2. Acute recurrent sinusitis, unspecified location    3. Nasal obstruction    4. Hypertrophy of both inferior nasal turbinates    5. Nasal septal deviation    6. Nasal valve collapse    7. Saddle nose           Plan:       Sinuses are relatively clear and noncontributory to symptoms  We discussed nasal  airway surgery. In her case, this would involve a septoplasty, possible open approach with possible valve repair and turbinate reduction.     Echo      I discussed at length the risk of nasal /  sinus surgery with the patient including, but not limited to, recurrence/persistence of symptoms, cosmetic change, bleeding, infection, tearing abnormality, septal perforation, tooth or cheek numbness, vision changes, orbital injury, CSF leak and changes in smell.  The patient had opportunity to ask questions and I answered all of them to their satisfaction.  We will proceed as planned.

## 2020-09-25 NOTE — DISCHARGE INSTRUCTIONS
Post-op Septoplasty and Inferior Turbinate Reduction  Surinder Bobby MD  Department of Otolaryngology, Head and Neck Surgery  [facility]      PHONE NUMBERS:    Office number: [phone]  EMERGENCY: call 911  URGENT ISSUES or after hours: [phone]  My cell phone: [phone]    WHAT TO DO    1. You should follow-up on:  Please call [phone] to set up a specific time or to change dates.  Call me ASAP if you are experiencing any unexpected problems.  2. Use the recommended medications / treatments as described  3. Familiarize yourself with the information in this pamphlet  4. Call me with questions. Also, please call me the day after surgery to let me know how you are doing.  5.  Cell phone: [phone], please call me with any concerns or questions. I am not always available to answer my cell phone, so for any urgent or important issues, use the other numbers provided above.    WHAT TO EXPECT    Nasal Discharge & Bleeding  · It is common to have mild bleeding and nasal drainage after nasal / sinus surgery.  · After surgery, a drip pad may have been placed under your nose. You may remove this at any point and can replace it if necessary at your discretion.    Pain & Pressure  · It is common to experience mild-to- moderate pain and pressure in your face and around your eyes.  · The pain usually is worst the first day after surgery.  · Use your medications as described below.  · For any severe pain, please contact me or present to the emergency room.    Nasal Congestion & Crusting  · It is common to experience nasal congestion after surgery. This is due to swelling and scabs. DO NOT BLOW YOUR NOSE until I say it is OK.  · Crusts are essentially scabs and mucus that build up in the nasal passages after surgery.  · Crusts eventually resolve. Usually, I will remove some crusts during your postoperative visit.  · Nasal irrigation helps to soften and remove scabs. Start  irrigation NOW  · Nasal irrigation kits are available over the counter in the nasal section. Common brands include SinuCleanse or NeilMed.    Nasal Irrigations  This will help remove the allergens, debris, and mucous from your nose to help you breathe. It will also clear it in preparation for other nasal medications.    To perform, purchase an over the counter sinus irrigation kit such as the NeilMed Sinus Rinse Kit. Use as directed on the box. You should use distilled water or water that was previously boiled and left to cool. If you wish, you may make your own solution. However, salt packets are available in the nasal section in your  drug store.     A rough estimate for making salt solution is:  8oz water  2 teaspoons salt (pickling, gabriel or Kosher salt)  1 teaspoon baking soda    After each use, rinse the bottle with small amount of rubbing alcohol and clean with soap.  Replace the irrigation bottle if it becomes visibly soiled or every few weeks.      Fatigue  · It is common to feel mild to moderate fatigue for 7-10 days after surgery.    ACTIVITY    · First 1-2 days after surgery  · Plan to rest. You may start light activities as tolerated.  · Days 2 though 10 following surgery  · Light activity only until 10 days after surgery, gradually increase activity.  · No extensive bending over for 7 days after surgery.  · No lifting over 20 pounds for one week after surgery  · You may shower starting 1 day after surgery    NUTRITION    · Day of surgery  · Drink plenty of water / fluids  · Eat light snacks as tolerated  · It is common for people to have less of an appetite the day of surgery    · Day after surgery  · Advance your diet as tolerated    MEDICATIONS    · Percocet (oxycodone): take one or two every 4-6 hours as needed for pain.  · [place] law now prohibits prescription of more than 1 week of narcotic pain medication. You should wean from narcotic pain medication by that point, but it not, you will need  to come to have a refill at that point approved by your doctor.  · Oral antibiotics: None  · Mupirocin ointment: place a pea-sized amount in your nose three times per day for 5 days.  · NOTE: please take an over the counter stool softener while you are on pain medications - they may cause constipation.    MEDICATION SAFETY TIPS    · Use medications only as directed in the amounts specified  · Avoid aspirin, ibuprofen, naproxen, and related pain medications for 10 days. Avoid fish oil (omega acids) and vitamin E for 1 week.  · As your pain lessens, you may Replace doses of prescription pain medications with acetaminophen (Tylenol). However, Do Not take doses of prescription pain medications at the same time as Tylenol because this could cause an overdose of Tylenol.  · Do Not drive or operate machinery if taking prescription pain medications  · Read each medication label carefully, ask your pharmacist if you have any questions regarding warnings on the label  · Do not measure liquid with a kitchen spoon. There are pediatric measuring devices available at the pharmacy. Ask for one when you get your prescription filled.  · Store all mediations out of reach of children.  · Call the [facility] if your child takes too much of any medicine or if your child  · consumes your medication [phone] or [phone].    Call my office if you experience any of the following:    · Fever higher than 101 F  · Clear, watery nasal discharge  · Any visual changes or marked swelling around the eyes  · Severe headache or neck stiffness  · Brisk bleeding

## 2020-09-25 NOTE — BRIEF OP NOTE
[facility]  Brief Operative Note     SUMMARY     Surgery Date: 9/25/2020     Surgeon(s) and Role:     * Surinder Bobby MD - Primary    Assisting Surgeon: None    Pre-op Diagnosis:  Acute recurrent sinusitis, unspecified location [J01.91]  Congenital heart failure [I50.9]  Nasal obstruction [J34.89]  Hypertrophy of both inferior nasal turbinates [J34.3]  Nasal septal deviation [J34.2]  Nasal valve collapse [J34.89]  Saddle nose [M95.0]    Post-op Diagnosis:  Post-Op Diagnosis Codes:     * Acute recurrent sinusitis, unspecified location [J01.91]     * Congenital heart failure [I50.9]     * Nasal obstruction [J34.89]     * Hypertrophy of both inferior nasal turbinates [J34.3]     * Nasal septal deviation [J34.2]     * Nasal valve collapse [J34.89]     * Saddle nose [M95.0]    Procedure(s) (LRB):  SEPTOPLASTY, NASAL (Bilateral)  REDUCTION, NASAL TURBINATE (Bilateral)    Anesthesia: General    Description of the findings of the procedure: septum, turbs    Findings/Key Components: septum, turbs    Estimated Blood Loss: * No values recorded between 9/25/2020  9:50 AM and 9/25/2020 11:02 AM *         Specimens:   Specimen (12h ago, onward)    None          Discharge Note    SUMMARY     Admit Date: 9/25/2020    Discharge Date and Time:  09/25/2020 11:02 AM    Hospital Course (synopsis of major diagnoses, care, treatment, and services provided during the course of the hospital stay): Did well following surgery and was discharged uneventfully     Final Diagnosis: Post-Op Diagnosis Codes:     * Acute recurrent sinusitis, unspecified location [J01.91]     * Congenital heart failure [I50.9]     * Nasal obstruction [J34.89]     * Hypertrophy of both inferior nasal turbinates [J34.3]     * Nasal septal deviation [J34.2]     * Nasal valve collapse [J34.89]     * Saddle nose [M95.0]    Disposition: Home or Self Care    Follow Up/Patient Instructions: Regular diet, Follow-up 3 days. Activity  light    Medications:  Reconciled Home Medications:   Current Discharge Medication List      CONTINUE these medications which have NOT CHANGED    Details   acetaminophen (TYLENOL) 500 MG tablet Take 1,000 mg by mouth every 6 (six) hours as needed for Pain.      gabapentin (NEURONTIN) 800 MG tablet       guaifenesin/pseudoephedrne HCl (MUCINEX D ORAL) Take by mouth.      HYDROcodone-acetaminophen (NORCO) 7.5-325 mg per tablet Take 1 tablet by mouth every 6 (six) hours as needed.  Qty: 28 tablet, Refills: 0    Associated Diagnoses: Chronic bilateral low back pain without sciatica      ibuprofen (ADVIL,MOTRIN) 800 MG tablet       levocetirizine (XYZAL) 5 MG tablet Take 1 tablet (5 mg total) by mouth every evening.  Qty: 30 tablet, Refills: 11    Associated Diagnoses: Upper respiratory tract infection, unspecified type      omeprazole (PRILOSEC) 40 MG capsule Take 40 mg by mouth once daily.      azelastine (ASTELIN) 137 mcg (0.1 %) nasal spray 1 spray (137 mcg total) by Nasal route 2 (two) times daily.  Qty: 30 mL, Refills: 1    Associated Diagnoses: Upper respiratory tract infection, unspecified type      fluticasone propionate (FLONASE) 50 mcg/actuation nasal spray 1 spray (50 mcg total) by Each Nostril route 2 (two) times daily as needed.  Qty: 15 g, Refills: 0    Associated Diagnoses: Acute sinusitis, recurrence not specified, unspecified location           No discharge procedures on file.

## 2020-09-25 NOTE — ANESTHESIA POSTPROCEDURE EVALUATION
Anesthesia Post Evaluation    Patient: Alisson Eldridge    Procedure(s) Performed: Procedure(s) (LRB):  SEPTOPLASTY, NASAL (Bilateral)  REDUCTION, NASAL TURBINATE (Bilateral)    Final Anesthesia Type: general    Patient location during evaluation: PACU  Patient participation: Yes- Able to Participate  Level of consciousness: sedated and awake  Post-procedure vital signs: reviewed and stable  Pain management: adequate  Airway patency: patent    PONV status at discharge: No PONV  Anesthetic complications: no      Cardiovascular status: blood pressure returned to baseline  Respiratory status: spontaneous ventilation  Hydration status: euvolemic  Follow-up not needed.          Vitals Value Taken Time   /57 09/25/20 1110   Temp 36.2 °C (97.2 °F) 09/25/20 1110   Pulse 100 09/25/20 1110   Resp 16 09/25/20 1110   SpO2 98 % 09/25/20 1110         Event Time   Out of Recovery 11:10:00         Pain/Hannah Score: Hannah Score: 9 (9/25/2020 11:10 AM)

## 2020-09-25 NOTE — ANESTHESIA PROCEDURE NOTES
Intubation  Performed by: Amara Ponce CRNA  Authorized by: Patricio Ceron MD     Intubation:     Induction:  Intravenous    Intubated:  Postinduction    Mask Ventilation:  Easy mask    Attempts:  1    Attempted By:  CRNA    Method of Intubation:  Direct    Blade:  Cummings 2    Laryngeal View Grade: Grade I - full view of chords      Difficult Airway Encountered?: No      Complications:  None    Airway Device:  Oral endotracheal tube    Airway Device Size:  7.0    Style/Cuff Inflation:  Cuffed (inflated to minimal occlusive pressure)    Inflation Amount (mL):  4    Tube secured:  21    Secured at:  The lips    Placement Verified By:  Capnometry    Complicating Factors:  Anterior larynx    Findings Post-Intubation:  BS equal bilateral

## 2020-09-28 ENCOUNTER — TELEPHONE (OUTPATIENT)
Dept: OTOLARYNGOLOGY | Facility: CLINIC | Age: 50
End: 2020-09-28

## 2020-09-28 ENCOUNTER — OFFICE VISIT (OUTPATIENT)
Dept: OTOLARYNGOLOGY | Facility: CLINIC | Age: 50
End: 2020-09-28
Payer: MEDICARE

## 2020-09-28 VITALS — HEIGHT: 60 IN | WEIGHT: 113.13 LBS | BODY MASS INDEX: 22.21 KG/M2

## 2020-09-28 DIAGNOSIS — Z72.0 TOBACCO ABUSE: ICD-10-CM

## 2020-09-28 DIAGNOSIS — Z98.890 S/P NASAL SEPTOPLASTY: Primary | ICD-10-CM

## 2020-09-28 PROCEDURE — 99213 OFFICE O/P EST LOW 20 MIN: CPT | Mod: PBBFAC,PO | Performed by: OTOLARYNGOLOGY

## 2020-09-28 PROCEDURE — 99024 POSTOP FOLLOW-UP VISIT: CPT | Mod: POP,,, | Performed by: OTOLARYNGOLOGY

## 2020-09-28 PROCEDURE — 99999 PR PBB SHADOW E&M-EST. PATIENT-LVL III: ICD-10-PCS | Mod: PBBFAC,,, | Performed by: OTOLARYNGOLOGY

## 2020-09-28 PROCEDURE — 99024 PR POST-OP FOLLOW-UP VISIT: ICD-10-PCS | Mod: POP,,, | Performed by: OTOLARYNGOLOGY

## 2020-09-28 PROCEDURE — 99999 PR PBB SHADOW E&M-EST. PATIENT-LVL III: CPT | Mod: PBBFAC,,, | Performed by: OTOLARYNGOLOGY

## 2020-09-28 RX ORDER — TIZANIDINE 4 MG/1
4 TABLET ORAL DAILY PRN
COMMUNITY
Start: 2020-09-22 | End: 2023-02-19

## 2020-09-28 NOTE — TELEPHONE ENCOUNTER
----- Message from Fany Gotti sent at 9/28/2020  2:00 PM CDT -----  Type: Needs Medical Advice  Who Called:  Patient  Best Call Back Number:   Additional Information:Patient is stuck in traffic and will not make it on time to her apportionment for 2:30, calling to cancel. New appointment made for 9/30

## 2020-09-29 NOTE — PROGRESS NOTES
Alisson is here for a post-operative visit following Septoplasty, Inferior Turbinate Reduction    No issues, doing well  Mild bleeding, as expected  Pain controlled      Exam:  Alert, active, appropriate  Nasal splints removed  Nasal cavity suctioned, patent  No septal hematoma or abnormal crusting      Healing well  Follow-up 1 month

## 2020-10-06 ENCOUNTER — TELEPHONE (OUTPATIENT)
Dept: OTOLARYNGOLOGY | Facility: CLINIC | Age: 50
End: 2020-10-06

## 2020-10-06 NOTE — TELEPHONE ENCOUNTER
----- Message from Samira Lawrence sent at 10/6/2020  2:39 PM CDT -----  Regarding: return call  Contact: patient  Type:  Patient Returning Call    Who Called:  patient  Who Left Message for Patient:  uli  Does the patient know what this is regarding?:  yes  Best Call Back Number:  [phone]  Additional Information:  sent message to teams.thanks!

## 2020-10-06 NOTE — TELEPHONE ENCOUNTER
----- Message from Cat Scales sent at 10/6/2020  9:25 AM CDT -----  Regarding: Advice  Contact: patient  Type: Needs Medical Advice    Who Called:  patient  Symptoms (please be specific):  nose is sensitive when she blows, and sore throat, no fever, no cough    How long has patient had these symptoms:  since surgery 9/25    Best Call Back Number: [phone] (home)       Additional Information: please call to advise

## 2020-10-12 ENCOUNTER — TELEPHONE (OUTPATIENT)
Dept: FAMILY MEDICINE | Facility: CLINIC | Age: 50
End: 2020-10-12

## 2020-10-12 DIAGNOSIS — Z12.31 ENCOUNTER FOR SCREENING MAMMOGRAM FOR MALIGNANT NEOPLASM OF BREAST: ICD-10-CM

## 2020-10-12 DIAGNOSIS — Z12.39 ENCOUNTER FOR SCREENING FOR MALIGNANT NEOPLASM OF BREAST, UNSPECIFIED SCREENING MODALITY: Primary | ICD-10-CM

## 2020-11-03 ENCOUNTER — OFFICE VISIT (OUTPATIENT)
Dept: OTOLARYNGOLOGY | Facility: CLINIC | Age: 50
End: 2020-11-03
Payer: MEDICARE

## 2020-11-03 VITALS — BODY MASS INDEX: 22.73 KG/M2 | TEMPERATURE: 97 F | WEIGHT: 116.38 LBS

## 2020-11-03 DIAGNOSIS — Z98.890 S/P NASAL SEPTOPLASTY: Primary | ICD-10-CM

## 2020-11-03 DIAGNOSIS — Z72.0 TOBACCO ABUSE: ICD-10-CM

## 2020-11-03 PROCEDURE — 99999 PR PBB SHADOW E&M-EST. PATIENT-LVL III: CPT | Mod: PBBFAC,,, | Performed by: OTOLARYNGOLOGY

## 2020-11-03 PROCEDURE — 99213 OFFICE O/P EST LOW 20 MIN: CPT | Mod: PBBFAC,PO | Performed by: OTOLARYNGOLOGY

## 2020-11-03 PROCEDURE — 99999 PR PBB SHADOW E&M-EST. PATIENT-LVL III: ICD-10-PCS | Mod: PBBFAC,,, | Performed by: OTOLARYNGOLOGY

## 2020-11-03 PROCEDURE — 99024 PR POST-OP FOLLOW-UP VISIT: ICD-10-PCS | Mod: POP,,, | Performed by: OTOLARYNGOLOGY

## 2020-11-03 PROCEDURE — 99024 POSTOP FOLLOW-UP VISIT: CPT | Mod: POP,,, | Performed by: OTOLARYNGOLOGY

## 2020-11-03 RX ORDER — FLUTICASONE PROPIONATE 50 MCG
2 SPRAY, SUSPENSION (ML) NASAL DAILY
Qty: 16 G | Refills: 11 | Status: ON HOLD | OUTPATIENT
Start: 2020-11-03 | End: 2021-03-10

## 2020-11-03 NOTE — PROGRESS NOTES
Alisson is here for a post-operative visit following Septoplasty, Inferior Turbinate Reduction    No issues, doing well  Breathing improved    Exam:  Alert, active, appropriate  Mild strut deflection but much improved septum posterior to this.   Turbinates well reduced      Healing well  FU rpn

## 2020-11-19 ENCOUNTER — TELEPHONE (OUTPATIENT)
Dept: FAMILY MEDICINE | Facility: CLINIC | Age: 50
End: 2020-11-19

## 2020-11-19 NOTE — TELEPHONE ENCOUNTER
----- Message from Gilda Medina, Patient Care Assistant sent at 11/19/2020  2:15 PM CST -----  Regarding: speak to nurse  Contact: patient  Type: Needs Medical Advice  Who Called:  patient  Symptoms (please be specific):  coughing up white mucus w/  hard sinus  pressure   How long has patient had these symptoms:  2 days  Pharmacy name and phone #:    [id number] - [place], [place] - [address] AT [facility]  & [address]  [address]  [facility] [address]  Phone: [phone] Fax: [phone]      Best Call Back Number: [phone]    Additional Information: patient states she would like to have a audio call instead  of in person and virtual , please call patient to advise , thanks

## 2020-11-20 ENCOUNTER — OFFICE VISIT (OUTPATIENT)
Dept: FAMILY MEDICINE | Facility: CLINIC | Age: 50
End: 2020-11-20
Payer: MEDICARE

## 2020-11-20 VITALS
TEMPERATURE: 98 F | DIASTOLIC BLOOD PRESSURE: 74 MMHG | WEIGHT: 116.19 LBS | BODY MASS INDEX: 22.81 KG/M2 | SYSTOLIC BLOOD PRESSURE: 110 MMHG | HEIGHT: 60 IN | OXYGEN SATURATION: 98 % | HEART RATE: 77 BPM

## 2020-11-20 DIAGNOSIS — J32.9 SINUSITIS, UNSPECIFIED CHRONICITY, UNSPECIFIED LOCATION: Primary | ICD-10-CM

## 2020-11-20 DIAGNOSIS — R05.9 COUGH: ICD-10-CM

## 2020-11-20 DIAGNOSIS — J06.9 UPPER RESPIRATORY TRACT INFECTION, UNSPECIFIED TYPE: ICD-10-CM

## 2020-11-20 PROCEDURE — 99999 PR PBB SHADOW E&M-EST. PATIENT-LVL IV: CPT | Mod: PBBFAC,,, | Performed by: NURSE PRACTITIONER

## 2020-11-20 PROCEDURE — 99213 OFFICE O/P EST LOW 20 MIN: CPT | Mod: S$PBB,,, | Performed by: NURSE PRACTITIONER

## 2020-11-20 PROCEDURE — 99214 OFFICE O/P EST MOD 30 MIN: CPT | Mod: PBBFAC,PO | Performed by: NURSE PRACTITIONER

## 2020-11-20 PROCEDURE — 99999 PR PBB SHADOW E&M-EST. PATIENT-LVL IV: ICD-10-PCS | Mod: PBBFAC,,, | Performed by: NURSE PRACTITIONER

## 2020-11-20 PROCEDURE — 99213 PR OFFICE/OUTPT VISIT, EST, LEVL III, 20-29 MIN: ICD-10-PCS | Mod: S$PBB,,, | Performed by: NURSE PRACTITIONER

## 2020-11-20 RX ORDER — AZELASTINE 1 MG/ML
1 SPRAY, METERED NASAL 2 TIMES DAILY
Qty: 30 ML | Refills: 1 | Status: SHIPPED | OUTPATIENT
Start: 2020-11-20 | End: 2021-10-12 | Stop reason: SDUPTHER

## 2020-11-20 RX ORDER — AMOXICILLIN AND CLAVULANATE POTASSIUM 875; 125 MG/1; MG/1
1 TABLET, FILM COATED ORAL EVERY 12 HOURS
Qty: 20 TABLET | Refills: 0 | Status: SHIPPED | OUTPATIENT
Start: 2020-11-20 | End: 2020-11-30

## 2020-11-20 RX ORDER — GUAIFENESIN 400 MG/1
400 TABLET ORAL EVERY 4 HOURS PRN
Qty: 30 TABLET | Refills: 1 | Status: SHIPPED | OUTPATIENT
Start: 2020-11-20 | End: 2020-11-30

## 2020-11-20 RX ORDER — PROMETHAZINE HYDROCHLORIDE AND DEXTROMETHORPHAN HYDROBROMIDE 6.25; 15 MG/5ML; MG/5ML
5 SYRUP ORAL EVERY 6 HOURS PRN
Qty: 180 ML | Refills: 0 | Status: SHIPPED | OUTPATIENT
Start: 2020-11-20 | End: 2020-11-30

## 2020-11-20 NOTE — PROGRESS NOTES
Patient ID: Alisson Eldridge is a 50 y.o. female.    Chief Complaint:   Sinusitis and Sinus Problem    Sinus Problem  This is a recurrent problem. The current episode started in the past 7 days. The problem has been gradually worsening since onset. There has been no fever. The pain is moderate. Associated symptoms include chills, congestion, coughing, ear pain, sinus pressure, a sore throat and swollen glands. Pertinent negatives include no diaphoresis, headaches, hoarse voice, neck pain, shortness of breath or sneezing. Treatments tried: benedryl, xyzal, flonase. The treatment provided no relief.      Reviewed past medical and social history.    Past Medical History:   Diagnosis Date    Abnormal uterine and vaginal bleeding, unspecified 10/29/2018    Allergy     Arthritis     Asthma     Cancer     cervical    CHF (congestive heart failure)     one episode, no follow up    Chronic back pain     Depression     GERD (gastroesophageal reflux disease)     History of psychiatric care     History of psychiatric hospitalization     Menorrhagia with regular cycle 10/11/2018    Migraine     Primary dysmenorrhea 10/11/2018    Psychiatric exam requested by authority     Psychiatric problem     Scoliosis     Therapy      Past Surgical History:   Procedure Laterality Date    APPENDECTOMY      COLPOSCOPY      EPIDURAL STEROID INJECTION INTO LUMBAR SPINE      LAPAROSCOPIC SALPINGO-OOPHORECTOMY  10/29/2018    Procedure: SALPINGO-OOPHORECTOMY, LAPAROSCOPIC;  Surgeon: Matthew Rene MD;  Location: [facility] OR;  Service: OB/GYN;;    LAPAROSCOPIC TOTAL HYSTERECTOMY N/A 10/29/2018    Procedure: HYSTERECTOMY, TOTAL, LAPAROSCOPIC;  Surgeon: Matthew Rene MD;  Location: [facility] OR;  Service: OB/GYN;  Laterality: N/A;    MULTIPLE TOOTH EXTRACTIONS      NASAL SEPTOPLASTY Bilateral 9/25/2020    Procedure: SEPTOPLASTY, NASAL;  Surgeon: Surinder Bobby MD;  Location: [facility] OR;  Service: ENT;  Laterality:  Bilateral;    NASAL TURBINATE REDUCTION Bilateral 9/25/2020    Procedure: REDUCTION, NASAL TURBINATE;  Surgeon: Surinder Bobby MD;  Location: [facility];  Service: ENT;  Laterality: Bilateral;    RHIZOTOMY W/ RADIOFREQUENCY ABLATION Bilateral      Current Outpatient Medications on File Prior to Visit   Medication Sig Dispense Refill    acetaminophen (TYLENOL) 500 MG tablet Take 1,000 mg by mouth every 6 (six) hours as needed for Pain.      fluticasone propionate (FLONASE) 50 mcg/actuation nasal spray 2 sprays (100 mcg total) by Each Nostril route once daily. 16 g 11    gabapentin (NEURONTIN) 800 MG tablet       guaifenesin/pseudoephedrne HCl (MUCINEX D ORAL) Take by mouth.      HYDROcodone-acetaminophen (NORCO) 5-325 mg per tablet Take 1 tablet by mouth every 6 (six) hours as needed for Pain. 12 tablet 0    ibuprofen (ADVIL,MOTRIN) 800 MG tablet       levocetirizine (XYZAL) 5 MG tablet Take 1 tablet (5 mg total) by mouth every evening. 30 tablet 11    omeprazole (PRILOSEC) 40 MG capsule Take 40 mg by mouth once daily.      ondansetron (ZOFRAN-ODT) 4 MG TbDL Take 1 tablet (4 mg total) by mouth every 6 (six) hours as needed. 10 tablet 0    tiZANidine (ZANAFLEX) 4 MG tablet TK 1 TO 3 TS PO HS PRN      [DISCONTINUED] azelastine (ASTELIN) 137 mcg (0.1 %) nasal spray 1 spray (137 mcg total) by Nasal route 2 (two) times daily. 30 mL 1    [DISCONTINUED] fluticasone propionate (FLONASE) 50 mcg/actuation nasal spray 1 spray (50 mcg total) by Each Nostril route 2 (two) times daily as needed. (Patient not taking: Reported on 11/20/2020) 15 g 0     Current Facility-Administered Medications on File Prior to Visit   Medication Dose Route Frequency Provider Last Rate Last Dose    lactated ringers infusion   Intravenous Continuous Patricio Ceron MD 10 mL/hr at 09/25/20 0936      lidocaine (PF) 10 mg/ml (1%) injection 10 mg  1 mL Intradermal Once Patricio Ceron MD           Review of Systems   Constitutional:  Positive for chills. Negative for diaphoresis and fever.   HENT: Positive for congestion, ear pain, postnasal drip, sinus pressure, sinus pain and sore throat. Negative for hoarse voice and sneezing.    Respiratory: Positive for cough. Negative for shortness of breath.    Gastrointestinal: Negative for diarrhea, nausea and vomiting.   Musculoskeletal: Negative for neck pain.   Skin: Negative for rash.   Neurological: Negative for headaches.   All other systems reviewed and are negative.      Objective:      Physical Exam  Vitals signs reviewed.   Constitutional:       Appearance: Normal appearance. She is well-developed.   HENT:      Head: Normocephalic and atraumatic.      Right Ear: Hearing, tympanic membrane, ear canal and external ear normal.      Left Ear: Hearing, tympanic membrane, ear canal and external ear normal.      Nose:      Right Turbinates: Enlarged.      Left Turbinates: Enlarged.      Right Sinus: Maxillary sinus tenderness and frontal sinus tenderness present.      Left Sinus: Maxillary sinus tenderness and frontal sinus tenderness present.      Mouth/Throat:      Pharynx: Uvula midline. Posterior oropharyngeal erythema present. No oropharyngeal exudate.      Tonsils: No tonsillar exudate.   Eyes:      Conjunctiva/sclera: Conjunctivae normal.      Pupils: Pupils are equal, round, and reactive to light.   Neck:      Musculoskeletal: Normal range of motion and neck supple.   Cardiovascular:      Rate and Rhythm: Normal rate and regular rhythm.      Heart sounds: Normal heart sounds.   Pulmonary:      Effort: Pulmonary effort is normal.      Breath sounds: Normal breath sounds.   Abdominal:      General: Bowel sounds are normal.      Palpations: Abdomen is soft.   Musculoskeletal: Normal range of motion.   Skin:     General: Skin is warm and dry.   Neurological:      Mental Status: She is alert and oriented to person, place, and time.   Psychiatric:         Mood and Affect: Mood normal.          Behavior: Behavior normal.         Assessment:       1. Sinusitis, unspecified chronicity, unspecified location    2. Cough    3. Upper respiratory tract infection, unspecified type        Plan:       Alisson was seen today for sinusitis and sinus problem.    Diagnoses and all orders for this visit:    Sinusitis, unspecified chronicity, unspecified location  -     amoxicillin-clavulanate 875-125mg (AUGMENTIN) 875-125 mg per tablet; Take 1 tablet by mouth every 12 (twelve) hours. for 10 days  -     guaiFENesin (HUMIBID E) 400 mg Tab; Take 1 tablet (400 mg total) by mouth every 4 (four) hours as needed (congestion).    Cough  -     promethazine-dextromethorphan (PROMETHAZINE-DM) 6.25-15 mg/5 mL Syrp; Take 5 mLs by mouth every 6 (six) hours as needed (cough).  -     guaiFENesin (HUMIBID E) 400 mg Tab; Take 1 tablet (400 mg total) by mouth every 4 (four) hours as needed (congestion).    Upper respiratory tract infection, unspecified type  -     azelastine (ASTELIN) 137 mcg (0.1 %) nasal spray; 1 spray (137 mcg total) by Nasal route 2 (two) times daily.    Patient education provided.  All questions and concerns addressed  RTC PRN and if symptoms worsen or fail to improve  Patient verbalizes understanding      Patient Instructions     Sinusitis (Antibiotic Treatment)    The sinuses are air-filled spaces within the bones of the face. They connect to the inside of the nose. Sinusitis is an inflammation of the tissue lining the sinus cavity. Sinus inflammation can occur during a cold. It can also be due to allergies to pollens and other particles in the air. Sinusitis can cause symptoms of sinus congestion and fullness. A sinus infection causes fever, headache and facial pain. There is often green or yellow drainage from the nose or into the back of the throat (post-nasal drip). You have been given antibiotics to treat this condition.  Home care:  · Take the full course of antibiotics as instructed. Do not stop taking them,  even if you feel better.  · Drink plenty of water, hot tea, and other liquids. This may help thin mucus. It also may promote sinus drainage.  · Heat may help soothe painful areas of the face. Use a towel soaked in hot water. Or,  the shower and direct the hot spray onto your face. Using a vaporizer along with a menthol rub at night may also help.   · An expectorant containing guaifenesin may help thin the mucus and promote drainage from the sinuses.  · Over-the-counter decongestants may be used unless a similar medicine was prescribed. Nasal sprays work the fastest. Use one that contains phenylephrine or oxymetazoline. First blow the nose gently. Then use the spray. Do not use these medicines more often than directed on the label or symptoms may get worse. You may also use tablets containing pseudoephedrine. Avoid products that combine ingredients, because side effects may be increased. Read labels. You can also ask the pharmacist for help. (NOTE: Persons with high blood pressure should not use decongestants. They can raise blood pressure.)  · Over-the-counter antihistamines may help if allergies contributed to your sinusitis.    · Do not use nasal rinses or irrigation during an acute sinus infection, unless told to by your health care provider. Rinsing may spread the infection to other sinuses.  · Use acetaminophen or ibuprofen to control pain, unless another pain medicine was prescribed. (If you have chronic liver or kidney disease or ever had a stomach ulcer, talk with your doctor before using these medicines. Aspirin should never be used in anyone under 18 years of age who is ill with a fever. It may cause severe liver damage.)  · Don't smoke. This can worsen symptoms.  Follow-up care  Follow up with your healthcare provider or our staff if you are not improving within the next week.  When to seek medical advice  Call your healthcare provider if any of these occur:  · Facial pain or headache becoming  more severe  · Stiff neck  · Unusual drowsiness or confusion  · Swelling of the forehead or eyelids  · Vision problems, including blurred or double vision  · Fever of 100.4ºF (38ºC) or higher, or as directed by your healthcare provider  · Seizure  · Breathing problems  · Symptoms not resolving within 10 days  Date Last Reviewed: 4/13/2015  © 6246-9612 [employer]. [address] [place] [address], [place] [address]. All rights reserved. This information is not intended as a substitute for professional medical care. Always follow your healthcare professional's instructions.

## 2020-11-30 ENCOUNTER — TELEPHONE (OUTPATIENT)
Dept: FAMILY MEDICINE | Facility: CLINIC | Age: 50
End: 2020-11-30

## 2020-11-30 NOTE — TELEPHONE ENCOUNTER
----- Message from Kimberly Palencia MA sent at 11/30/2020 11:04 AM CST -----  Contact: patient  Please read below.   ----- Message -----  From: Ted Foster  Sent: 11/30/2020  10:57 AM CST  To: Ramesh JOHNS Staff    Patient called in and stated she was seen on 11/20/20 and diagnosed with a sinus infection.  Patient stated she completed her antibiotics but she is still having a green discharge & ears & face are still hurting. Patient wanted to know if she could just get refills on her medications or if she would have to come in?  Patient stated her temp is right under 100.      [id number] - [place], [place] - [address] [facility]  [address] [facility] [address]  Phone: [phone] Fax: [phone]    Patient call back number is [phone]

## 2020-11-30 NOTE — TELEPHONE ENCOUNTER
Based on her symptoms, I would recommend patient going to the nearest urgent care or emergency room for further evaluation and possible COVID testing.

## 2020-12-03 ENCOUNTER — TELEPHONE (OUTPATIENT)
Dept: FAMILY MEDICINE | Facility: CLINIC | Age: 50
End: 2020-12-03

## 2020-12-03 NOTE — TELEPHONE ENCOUNTER
----- Message from Maya Cummings sent at 12/3/2020  1:33 PM CST -----  Regarding: Advice  Contact: Patient  Type: Needs Medical Advice    Who Called: Patient  Best Call Back Number:[phone] (home)   Additional Info-patients states that her and her  both had the COVID test on 12/ 30/20 and still very sick Please Advise-Thank you~

## 2020-12-04 ENCOUNTER — TELEPHONE (OUTPATIENT)
Dept: FAMILY MEDICINE | Facility: CLINIC | Age: 50
End: 2020-12-04

## 2020-12-04 NOTE — TELEPHONE ENCOUNTER
----- Message from Kimberly Palencia MA sent at 12/3/2020  4:21 PM CST -----  Pt called stating that she and her fiance both went and got swabbed on Monday for covid. The test came back negative but they still aren't feeling any better. I did not want to put them on your schedule unless you felt comfortable with me doing so.

## 2020-12-07 ENCOUNTER — TELEPHONE (OUTPATIENT)
Dept: FAMILY MEDICINE | Facility: CLINIC | Age: 50
End: 2020-12-07

## 2020-12-07 NOTE — TELEPHONE ENCOUNTER
Pt audio visit appt rescheduled for tomorrow 12/08/2020 at 0900 with ERWIN Rollins. Pt verbalized understanding.

## 2020-12-07 NOTE — TELEPHONE ENCOUNTER
----- Message from Lyla Crandall MA sent at 12/7/2020  2:55 PM CST -----  Pt stated she was to received a call today for an appt for 120pm   Call back # [phone]

## 2020-12-08 ENCOUNTER — OFFICE VISIT (OUTPATIENT)
Dept: FAMILY MEDICINE | Facility: CLINIC | Age: 50
End: 2020-12-08
Payer: MEDICARE

## 2020-12-08 DIAGNOSIS — J32.9 SINUSITIS, UNSPECIFIED CHRONICITY, UNSPECIFIED LOCATION: Primary | ICD-10-CM

## 2020-12-08 DIAGNOSIS — J06.9 UPPER RESPIRATORY TRACT INFECTION, UNSPECIFIED TYPE: ICD-10-CM

## 2020-12-08 DIAGNOSIS — R05.9 COUGH: ICD-10-CM

## 2020-12-08 PROCEDURE — 99441 PR PHYSICIAN TELEPHONE EVALUATION 5-10 MIN: ICD-10-PCS | Mod: 95,,, | Performed by: FAMILY MEDICINE

## 2020-12-08 PROCEDURE — 99441 PR PHYSICIAN TELEPHONE EVALUATION 5-10 MIN: CPT | Mod: 95,,, | Performed by: FAMILY MEDICINE

## 2020-12-08 RX ORDER — METHYLPREDNISOLONE 4 MG/1
TABLET ORAL
Qty: 21 TABLET | Refills: 0 | Status: SHIPPED | OUTPATIENT
Start: 2020-12-08 | End: 2021-02-05

## 2020-12-08 RX ORDER — PROMETHAZINE HYDROCHLORIDE AND DEXTROMETHORPHAN HYDROBROMIDE 6.25; 15 MG/5ML; MG/5ML
5 SYRUP ORAL EVERY 4 HOURS PRN
Qty: 240 ML | Refills: 0 | Status: SHIPPED | OUTPATIENT
Start: 2020-12-08 | End: 2020-12-18

## 2020-12-08 RX ORDER — CEPHALEXIN 500 MG/1
500 CAPSULE ORAL 3 TIMES DAILY
Qty: 30 CAPSULE | Refills: 0 | Status: SHIPPED | OUTPATIENT
Start: 2020-12-08 | End: 2020-12-18

## 2020-12-08 NOTE — PROGRESS NOTES
Established Patient - Audio Only Telehealth Visit     The patient location is: home in [place], [place]  The chief complaint leading to consultation is: green phlegm  Visit type: Virtual visit with audio only (telephone)  Total time spent with patient:8 minutes       The reason for the audio only service rather than synchronous audio and video virtual visit was related to technical difficulties or patient preference/necessity.     Each patient to whom I provide medical services by telemedicine is:  (1) informed of the relationship between the physician and patient and the respective role of any other health care provider with respect to management of the patient; and (2) notified that they may decline to receive medical services by telemedicine and may withdraw from such care at any time. Patient verbally consented to receive this service via voice-only telephone call.       HPI: Mrs Vinson presents on virtual audio only visit today due to persistent URI. Patient states she never really got better with the other medications from her previous visit. Patient states she is still coughing and this is keeping her up at night. Patient states when she coughs up phlegm it is green. Patient is still taking allergy medication as directed but is not getting any relief.   Patient educated on plan of  Care, verbalized understanding.      Assessment and plan:    Alisson was seen today for sinus problem.    Diagnoses and all orders for this visit:    Sinusitis, unspecified chronicity, unspecified location / Upper respiratory tract infection, unspecified type  -     cephALEXin (KEFLEX) 500 MG capsule; Take 1 capsule (500 mg total) by mouth 3 (three) times daily. for 10 days  -     methylPREDNISolone (MEDROL DOSEPACK) 4 mg tablet; use as directed    Cough  -     promethazine-dextromethorphan (PROMETHAZINE-DM) 6.25-15 mg/5 mL Syrp; Take 5 mLs by mouth every 4 (four) hours as needed.  -     methylPREDNISolone (MEDROL DOSEPACK) 4 mg tablet;  use as directed      Follow up if symptoms worsen or fail to improve.                          This service was not originating from a related E/M service provided within the previous 7 days nor will  to an E/M service or procedure within the next 24 hours or my soonest available appointment.  Prevailing standard of care was able to be met in this audio-only visit.

## 2021-02-05 ENCOUNTER — OFFICE VISIT (OUTPATIENT)
Dept: FAMILY MEDICINE | Facility: CLINIC | Age: 51
End: 2021-02-05
Payer: MEDICARE

## 2021-02-05 DIAGNOSIS — J01.10 ACUTE FRONTAL SINUSITIS, RECURRENCE NOT SPECIFIED: ICD-10-CM

## 2021-02-05 DIAGNOSIS — R05.9 COUGH: ICD-10-CM

## 2021-02-05 DIAGNOSIS — J02.9 SORE THROAT: Primary | ICD-10-CM

## 2021-02-05 PROCEDURE — 99441 PR PHYSICIAN TELEPHONE EVALUATION 5-10 MIN: CPT | Mod: 95,,, | Performed by: FAMILY MEDICINE

## 2021-02-05 PROCEDURE — 99441 PR PHYSICIAN TELEPHONE EVALUATION 5-10 MIN: ICD-10-PCS | Mod: 95,,, | Performed by: FAMILY MEDICINE

## 2021-02-05 RX ORDER — AMOXICILLIN AND CLAVULANATE POTASSIUM 875; 125 MG/1; MG/1
1 TABLET, FILM COATED ORAL EVERY 12 HOURS
Qty: 14 TABLET | Refills: 0 | Status: SHIPPED | OUTPATIENT
Start: 2021-02-05 | End: 2021-02-12

## 2021-02-05 RX ORDER — METHYLPREDNISOLONE 4 MG/1
TABLET ORAL
Qty: 21 TABLET | Refills: 0 | Status: SHIPPED | OUTPATIENT
Start: 2021-02-05 | End: 2021-03-10

## 2021-02-05 RX ORDER — PROMETHAZINE HYDROCHLORIDE AND DEXTROMETHORPHAN HYDROBROMIDE 6.25; 15 MG/5ML; MG/5ML
5 SYRUP ORAL EVERY 4 HOURS PRN
Qty: 240 ML | Refills: 0 | Status: SHIPPED | OUTPATIENT
Start: 2021-02-05 | End: 2021-02-15

## 2021-02-23 ENCOUNTER — APPOINTMENT (OUTPATIENT)
Dept: LAB | Facility: HOSPITAL | Age: 51
End: 2021-02-23
Attending: FAMILY MEDICINE
Payer: MEDICARE

## 2021-02-23 ENCOUNTER — OFFICE VISIT (OUTPATIENT)
Dept: FAMILY MEDICINE | Facility: CLINIC | Age: 51
End: 2021-02-23
Payer: MEDICARE

## 2021-02-23 VITALS
WEIGHT: 107.94 LBS | SYSTOLIC BLOOD PRESSURE: 122 MMHG | BODY MASS INDEX: 21.19 KG/M2 | OXYGEN SATURATION: 97 % | RESPIRATION RATE: 15 BRPM | HEIGHT: 60 IN | HEART RATE: 94 BPM | DIASTOLIC BLOOD PRESSURE: 86 MMHG

## 2021-02-23 DIAGNOSIS — R11.0 NAUSEA: ICD-10-CM

## 2021-02-23 DIAGNOSIS — R68.83 CHILLS: ICD-10-CM

## 2021-02-23 DIAGNOSIS — Z20.822 ENCOUNTER FOR LABORATORY TESTING FOR COVID-19 VIRUS: ICD-10-CM

## 2021-02-23 DIAGNOSIS — M79.10 MUSCLE PAIN: ICD-10-CM

## 2021-02-23 DIAGNOSIS — A09 DIARRHEA OF INFECTIOUS ORIGIN: Primary | ICD-10-CM

## 2021-02-23 DIAGNOSIS — R30.0 DYSURIA: ICD-10-CM

## 2021-02-23 DIAGNOSIS — R19.7 DIARRHEA: ICD-10-CM

## 2021-02-23 DIAGNOSIS — R11.14 BILIOUS VOMITING WITH NAUSEA: ICD-10-CM

## 2021-02-23 PROCEDURE — U0005 INFEC AGEN DETEC AMPLI PROBE: HCPCS

## 2021-02-23 PROCEDURE — 99214 OFFICE O/P EST MOD 30 MIN: CPT | Mod: S$PBB,,, | Performed by: FAMILY MEDICINE

## 2021-02-23 PROCEDURE — 99214 PR OFFICE/OUTPT VISIT, EST, LEVL IV, 30-39 MIN: ICD-10-PCS | Mod: S$PBB,,, | Performed by: FAMILY MEDICINE

## 2021-02-23 PROCEDURE — 99214 OFFICE O/P EST MOD 30 MIN: CPT | Mod: PBBFAC,PN | Performed by: FAMILY MEDICINE

## 2021-02-23 PROCEDURE — U0003 INFECTIOUS AGENT DETECTION BY NUCLEIC ACID (DNA OR RNA); SEVERE ACUTE RESPIRATORY SYNDROME CORONAVIRUS 2 (SARS-COV-2) (CORONAVIRUS DISEASE [COVID-19]), AMPLIFIED PROBE TECHNIQUE, MAKING USE OF HIGH THROUGHPUT TECHNOLOGIES AS DESCRIBED BY CMS-2020-01-R: HCPCS

## 2021-02-23 PROCEDURE — 99999 PR PBB SHADOW E&M-EST. PATIENT-LVL IV: ICD-10-PCS | Mod: PBBFAC,,, | Performed by: FAMILY MEDICINE

## 2021-02-23 PROCEDURE — S0119 ONDANSETRON 4 MG: HCPCS | Mod: PBBFAC,PN

## 2021-02-23 PROCEDURE — 99999 PR PBB SHADOW E&M-EST. PATIENT-LVL IV: CPT | Mod: PBBFAC,,, | Performed by: FAMILY MEDICINE

## 2021-02-23 RX ORDER — ONDANSETRON 4 MG/1
4 TABLET, ORALLY DISINTEGRATING ORAL EVERY 6 HOURS PRN
Qty: 30 TABLET | Refills: 0 | Status: SHIPPED | OUTPATIENT
Start: 2021-02-23 | End: 2021-03-10

## 2021-02-23 RX ORDER — METRONIDAZOLE 500 MG/1
500 TABLET ORAL EVERY 12 HOURS
Qty: 14 TABLET | Refills: 0 | Status: SHIPPED | OUTPATIENT
Start: 2021-02-23 | End: 2021-03-02

## 2021-02-23 RX ORDER — ONDANSETRON 4 MG/1
4 TABLET, ORALLY DISINTEGRATING ORAL
Status: COMPLETED | OUTPATIENT
Start: 2021-02-23 | End: 2021-02-23

## 2021-02-23 RX ADMIN — ONDANSETRON 4 MG: 4 TABLET, ORALLY DISINTEGRATING ORAL at 02:02

## 2021-02-25 LAB — SARS-COV-2 RNA RESP QL NAA+PROBE: NOT DETECTED

## 2021-03-01 ENCOUNTER — TELEPHONE (OUTPATIENT)
Dept: ALLERGY | Facility: CLINIC | Age: 51
End: 2021-03-01

## 2021-03-01 ENCOUNTER — TELEPHONE (OUTPATIENT)
Dept: FAMILY MEDICINE | Facility: CLINIC | Age: 51
End: 2021-03-01

## 2021-03-08 ENCOUNTER — TELEPHONE (OUTPATIENT)
Dept: FAMILY MEDICINE | Facility: CLINIC | Age: 51
End: 2021-03-08

## 2021-03-08 ENCOUNTER — OFFICE VISIT (OUTPATIENT)
Dept: FAMILY MEDICINE | Facility: CLINIC | Age: 51
End: 2021-03-08
Payer: MEDICARE

## 2021-03-08 DIAGNOSIS — Z86.79 HISTORY OF CHF (CONGESTIVE HEART FAILURE): ICD-10-CM

## 2021-03-08 DIAGNOSIS — Z13.6 ENCOUNTER FOR LIPID SCREENING FOR CARDIOVASCULAR DISEASE: ICD-10-CM

## 2021-03-08 DIAGNOSIS — Z72.0 TOBACCO ABUSE: ICD-10-CM

## 2021-03-08 DIAGNOSIS — I95.9 HYPOTENSION, UNSPECIFIED HYPOTENSION TYPE: Primary | ICD-10-CM

## 2021-03-08 DIAGNOSIS — Z13.220 ENCOUNTER FOR LIPID SCREENING FOR CARDIOVASCULAR DISEASE: ICD-10-CM

## 2021-03-08 DIAGNOSIS — D50.0 IRON DEFICIENCY ANEMIA DUE TO CHRONIC BLOOD LOSS: ICD-10-CM

## 2021-03-08 PROCEDURE — 99499 UNLISTED E&M SERVICE: CPT | Mod: 95,,, | Performed by: FAMILY MEDICINE

## 2021-03-08 PROCEDURE — 99499 NO LOS: ICD-10-PCS | Mod: 95,,, | Performed by: FAMILY MEDICINE

## 2021-03-09 ENCOUNTER — LAB VISIT (OUTPATIENT)
Dept: LAB | Facility: HOSPITAL | Age: 51
End: 2021-03-09
Attending: FAMILY MEDICINE
Payer: MEDICARE

## 2021-03-09 DIAGNOSIS — I95.9 HYPOTENSION, UNSPECIFIED HYPOTENSION TYPE: ICD-10-CM

## 2021-03-09 DIAGNOSIS — D50.0 IRON DEFICIENCY ANEMIA DUE TO CHRONIC BLOOD LOSS: ICD-10-CM

## 2021-03-09 DIAGNOSIS — Z13.6 ENCOUNTER FOR LIPID SCREENING FOR CARDIOVASCULAR DISEASE: ICD-10-CM

## 2021-03-09 DIAGNOSIS — Z13.220 ENCOUNTER FOR LIPID SCREENING FOR CARDIOVASCULAR DISEASE: ICD-10-CM

## 2021-03-09 LAB
ALBUMIN SERPL BCP-MCNC: 3.5 G/DL (ref 3.5–5.2)
ALP SERPL-CCNC: 141 U/L (ref 55–135)
ALT SERPL W/O P-5'-P-CCNC: 19 U/L (ref 10–44)
ANION GAP SERPL CALC-SCNC: 9 MMOL/L (ref 8–16)
AST SERPL-CCNC: 14 U/L (ref 10–40)
BASOPHILS # BLD AUTO: 0.12 K/UL (ref 0–0.2)
BASOPHILS NFR BLD: 0.7 % (ref 0–1.9)
BILIRUB SERPL-MCNC: 0.3 MG/DL (ref 0.1–1)
BUN SERPL-MCNC: 20 MG/DL (ref 6–20)
CALCIUM SERPL-MCNC: 8.9 MG/DL (ref 8.7–10.5)
CHLORIDE SERPL-SCNC: 121 MMOL/L (ref 95–110)
CHOLEST SERPL-MCNC: 107 MG/DL (ref 120–199)
CHOLEST/HDLC SERPL: 2.7 {RATIO} (ref 2–5)
CO2 SERPL-SCNC: 15 MMOL/L (ref 23–29)
CREAT SERPL-MCNC: 0.8 MG/DL (ref 0.5–1.4)
DIFFERENTIAL METHOD: ABNORMAL
EOSINOPHIL # BLD AUTO: 0.3 K/UL (ref 0–0.5)
EOSINOPHIL NFR BLD: 1.6 % (ref 0–8)
ERYTHROCYTE [DISTWIDTH] IN BLOOD BY AUTOMATED COUNT: 15 % (ref 11.5–14.5)
EST. GFR  (AFRICAN AMERICAN): >60 ML/MIN/1.73 M^2
EST. GFR  (NON AFRICAN AMERICAN): >60 ML/MIN/1.73 M^2
GLUCOSE SERPL-MCNC: 122 MG/DL (ref 70–110)
HCT VFR BLD AUTO: 32.6 % (ref 37–48.5)
HDLC SERPL-MCNC: 39 MG/DL (ref 40–75)
HDLC SERPL: 36.4 % (ref 20–50)
HGB BLD-MCNC: 10.5 G/DL (ref 12–16)
IMM GRANULOCYTES # BLD AUTO: 0.22 K/UL (ref 0–0.04)
IMM GRANULOCYTES NFR BLD AUTO: 1.4 % (ref 0–0.5)
LDLC SERPL CALC-MCNC: 48.8 MG/DL (ref 63–159)
LYMPHOCYTES # BLD AUTO: 1.7 K/UL (ref 1–4.8)
LYMPHOCYTES NFR BLD: 10.8 % (ref 18–48)
MCH RBC QN AUTO: 33.1 PG (ref 27–31)
MCHC RBC AUTO-ENTMCNC: 32.2 G/DL (ref 32–36)
MCV RBC AUTO: 103 FL (ref 82–98)
MONOCYTES # BLD AUTO: 0.9 K/UL (ref 0.3–1)
MONOCYTES NFR BLD: 5.7 % (ref 4–15)
NEUTROPHILS # BLD AUTO: 12.9 K/UL (ref 1.8–7.7)
NEUTROPHILS NFR BLD: 79.8 % (ref 38–73)
NONHDLC SERPL-MCNC: 68 MG/DL
NRBC BLD-RTO: 0 /100 WBC
PLATELET # BLD AUTO: 744 K/UL (ref 150–350)
PMV BLD AUTO: 8.9 FL (ref 9.2–12.9)
POTASSIUM SERPL-SCNC: 3.3 MMOL/L (ref 3.5–5.1)
PROT SERPL-MCNC: 6.4 G/DL (ref 6–8.4)
RBC # BLD AUTO: 3.17 M/UL (ref 4–5.4)
SODIUM SERPL-SCNC: 145 MMOL/L (ref 136–145)
TRIGL SERPL-MCNC: 96 MG/DL (ref 30–150)
WBC # BLD AUTO: 16.09 K/UL (ref 3.9–12.7)

## 2021-03-09 PROCEDURE — 80053 COMPREHEN METABOLIC PANEL: CPT | Performed by: FAMILY MEDICINE

## 2021-03-09 PROCEDURE — 80061 LIPID PANEL: CPT | Performed by: FAMILY MEDICINE

## 2021-03-09 PROCEDURE — 36415 COLL VENOUS BLD VENIPUNCTURE: CPT | Performed by: FAMILY MEDICINE

## 2021-03-09 PROCEDURE — 85025 COMPLETE CBC W/AUTO DIFF WBC: CPT | Performed by: FAMILY MEDICINE

## 2021-03-10 ENCOUNTER — OFFICE VISIT (OUTPATIENT)
Dept: FAMILY MEDICINE | Facility: CLINIC | Age: 51
End: 2021-03-10
Payer: MEDICARE

## 2021-03-10 ENCOUNTER — HOSPITAL ENCOUNTER (OUTPATIENT)
Facility: HOSPITAL | Age: 51
Discharge: HOME | End: 2021-03-12
Attending: EMERGENCY MEDICINE | Admitting: STUDENT IN AN ORGANIZED HEALTH CARE EDUCATION/TRAINING PROGRAM
Payer: MEDICARE

## 2021-03-10 VITALS
WEIGHT: 109.56 LBS | HEIGHT: 60 IN | BODY MASS INDEX: 21.51 KG/M2 | TEMPERATURE: 98 F | OXYGEN SATURATION: 97 % | HEART RATE: 92 BPM | RESPIRATION RATE: 18 BRPM | SYSTOLIC BLOOD PRESSURE: 88 MMHG | DIASTOLIC BLOOD PRESSURE: 62 MMHG

## 2021-03-10 DIAGNOSIS — R11.2 NAUSEA AND VOMITING, INTRACTABILITY OF VOMITING NOT SPECIFIED, UNSPECIFIED VOMITING TYPE: ICD-10-CM

## 2021-03-10 DIAGNOSIS — K80.00 ACUTE CALCULOUS CHOLECYSTITIS: Primary | ICD-10-CM

## 2021-03-10 DIAGNOSIS — R11.2 NAUSEA AND VOMITING: ICD-10-CM

## 2021-03-10 DIAGNOSIS — K81.9 CHOLECYSTITIS: ICD-10-CM

## 2021-03-10 DIAGNOSIS — K21.9 LPRD (LARYNGOPHARYNGEAL REFLUX DISEASE): ICD-10-CM

## 2021-03-10 DIAGNOSIS — E87.6 HYPOKALEMIA: ICD-10-CM

## 2021-03-10 DIAGNOSIS — R07.9 CHEST PAIN: ICD-10-CM

## 2021-03-10 DIAGNOSIS — D50.0 IRON DEFICIENCY ANEMIA DUE TO CHRONIC BLOOD LOSS: ICD-10-CM

## 2021-03-10 DIAGNOSIS — I95.9 HYPOTENSION, UNSPECIFIED HYPOTENSION TYPE: Primary | ICD-10-CM

## 2021-03-10 PROBLEM — D75.839 THROMBOCYTOSIS: Status: ACTIVE | Noted: 2021-03-10

## 2021-03-10 LAB
ALBUMIN SERPL BCP-MCNC: 3.4 G/DL (ref 3.5–5.2)
ALP SERPL-CCNC: 175 U/L (ref 55–135)
ALT SERPL W/O P-5'-P-CCNC: 17 U/L (ref 10–44)
ANION GAP SERPL CALC-SCNC: 13 MMOL/L (ref 8–16)
AST SERPL-CCNC: 15 U/L (ref 10–40)
BASOPHILS # BLD AUTO: 0.11 K/UL (ref 0–0.2)
BASOPHILS NFR BLD: 1.1 % (ref 0–1.9)
BILIRUB SERPL-MCNC: 0.2 MG/DL (ref 0.1–1)
BUN SERPL-MCNC: 11 MG/DL (ref 6–20)
CALCIUM SERPL-MCNC: 8.8 MG/DL (ref 8.7–10.5)
CHLORIDE SERPL-SCNC: 117 MMOL/L (ref 95–110)
CK SERPL-CCNC: 30 U/L (ref 20–180)
CO2 SERPL-SCNC: 14 MMOL/L (ref 23–29)
CREAT SERPL-MCNC: 0.7 MG/DL (ref 0.5–1.4)
DIFFERENTIAL METHOD: ABNORMAL
EOSINOPHIL # BLD AUTO: 0.1 K/UL (ref 0–0.5)
EOSINOPHIL NFR BLD: 1.3 % (ref 0–8)
ERYTHROCYTE [DISTWIDTH] IN BLOOD BY AUTOMATED COUNT: 14.5 % (ref 11.5–14.5)
EST. GFR  (AFRICAN AMERICAN): >60 ML/MIN/1.73 M^2
EST. GFR  (NON AFRICAN AMERICAN): >60 ML/MIN/1.73 M^2
GGT SERPL-CCNC: 79 U/L (ref 8–55)
GLUCOSE SERPL-MCNC: 92 MG/DL (ref 70–110)
HCT VFR BLD AUTO: 33.2 % (ref 37–48.5)
HGB BLD-MCNC: 11 G/DL (ref 12–16)
IMM GRANULOCYTES # BLD AUTO: 0.15 K/UL (ref 0–0.04)
IMM GRANULOCYTES NFR BLD AUTO: 1.5 % (ref 0–0.5)
LACTATE SERPL-SCNC: 1 MMOL/L (ref 0.5–2.2)
LIPASE SERPL-CCNC: 96 U/L (ref 4–60)
LYMPHOCYTES # BLD AUTO: 1.7 K/UL (ref 1–4.8)
LYMPHOCYTES NFR BLD: 17.3 % (ref 18–48)
MCH RBC QN AUTO: 33.5 PG (ref 27–31)
MCHC RBC AUTO-ENTMCNC: 33.1 G/DL (ref 32–36)
MCV RBC AUTO: 101 FL (ref 82–98)
MONOCYTES # BLD AUTO: 0.7 K/UL (ref 0.3–1)
MONOCYTES NFR BLD: 6.7 % (ref 4–15)
NEUTROPHILS # BLD AUTO: 7 K/UL (ref 1.8–7.7)
NEUTROPHILS NFR BLD: 72.1 % (ref 38–73)
NRBC BLD-RTO: 0 /100 WBC
PLATELET # BLD AUTO: 652 K/UL (ref 150–350)
PMV BLD AUTO: 8.9 FL (ref 9.2–12.9)
POTASSIUM SERPL-SCNC: 2.8 MMOL/L (ref 3.5–5.1)
POTASSIUM SERPL-SCNC: 3.6 MMOL/L (ref 3.5–5.1)
PROT SERPL-MCNC: 6.2 G/DL (ref 6–8.4)
RBC # BLD AUTO: 3.28 M/UL (ref 4–5.4)
SARS-COV-2 RDRP RESP QL NAA+PROBE: NEGATIVE
SODIUM SERPL-SCNC: 144 MMOL/L (ref 136–145)
WBC # BLD AUTO: 9.71 K/UL (ref 3.9–12.7)

## 2021-03-10 PROCEDURE — 83605 ASSAY OF LACTIC ACID: CPT | Performed by: STUDENT IN AN ORGANIZED HEALTH CARE EDUCATION/TRAINING PROGRAM

## 2021-03-10 PROCEDURE — 99204 PR OFFICE/OUTPT VISIT, NEW, LEVL IV, 45-59 MIN: ICD-10-PCS | Mod: ,,, | Performed by: INTERNAL MEDICINE

## 2021-03-10 PROCEDURE — 82977 ASSAY OF GGT: CPT | Performed by: STUDENT IN AN ORGANIZED HEALTH CARE EDUCATION/TRAINING PROGRAM

## 2021-03-10 PROCEDURE — 25000003 PHARM REV CODE 250: Performed by: NURSE PRACTITIONER

## 2021-03-10 PROCEDURE — 96375 TX/PRO/DX INJ NEW DRUG ADDON: CPT

## 2021-03-10 PROCEDURE — 36415 COLL VENOUS BLD VENIPUNCTURE: CPT | Performed by: STUDENT IN AN ORGANIZED HEALTH CARE EDUCATION/TRAINING PROGRAM

## 2021-03-10 PROCEDURE — 83690 ASSAY OF LIPASE: CPT | Performed by: EMERGENCY MEDICINE

## 2021-03-10 PROCEDURE — 82550 ASSAY OF CK (CPK): CPT | Performed by: STUDENT IN AN ORGANIZED HEALTH CARE EDUCATION/TRAINING PROGRAM

## 2021-03-10 PROCEDURE — 96374 THER/PROPH/DIAG INJ IV PUSH: CPT

## 2021-03-10 PROCEDURE — 25000003 PHARM REV CODE 250: Performed by: STUDENT IN AN ORGANIZED HEALTH CARE EDUCATION/TRAINING PROGRAM

## 2021-03-10 PROCEDURE — 99204 OFFICE O/P NEW MOD 45 MIN: CPT | Mod: ,,, | Performed by: INTERNAL MEDICINE

## 2021-03-10 PROCEDURE — 99214 OFFICE O/P EST MOD 30 MIN: CPT | Mod: PBBFAC,PO | Performed by: FAMILY MEDICINE

## 2021-03-10 PROCEDURE — 36415 COLL VENOUS BLD VENIPUNCTURE: CPT | Performed by: EMERGENCY MEDICINE

## 2021-03-10 PROCEDURE — 36415 COLL VENOUS BLD VENIPUNCTURE: CPT | Performed by: NURSE PRACTITIONER

## 2021-03-10 PROCEDURE — 99999 PR PBB SHADOW E&M-EST. PATIENT-LVL IV: CPT | Mod: PBBFAC,,, | Performed by: FAMILY MEDICINE

## 2021-03-10 PROCEDURE — G0378 HOSPITAL OBSERVATION PER HR: HCPCS

## 2021-03-10 PROCEDURE — 96361 HYDRATE IV INFUSION ADD-ON: CPT

## 2021-03-10 PROCEDURE — 96365 THER/PROPH/DIAG IV INF INIT: CPT

## 2021-03-10 PROCEDURE — 96366 THER/PROPH/DIAG IV INF ADDON: CPT

## 2021-03-10 PROCEDURE — 63600175 PHARM REV CODE 636 W HCPCS: Performed by: EMERGENCY MEDICINE

## 2021-03-10 PROCEDURE — 99285 EMERGENCY DEPT VISIT HI MDM: CPT | Mod: 25,27

## 2021-03-10 PROCEDURE — 84132 ASSAY OF SERUM POTASSIUM: CPT | Performed by: NURSE PRACTITIONER

## 2021-03-10 PROCEDURE — 85025 COMPLETE CBC W/AUTO DIFF WBC: CPT | Performed by: EMERGENCY MEDICINE

## 2021-03-10 PROCEDURE — 99215 PR OFFICE/OUTPT VISIT, EST, LEVL V, 40-54 MIN: ICD-10-PCS | Mod: S$PBB,,, | Performed by: FAMILY MEDICINE

## 2021-03-10 PROCEDURE — 25000003 PHARM REV CODE 250: Performed by: EMERGENCY MEDICINE

## 2021-03-10 PROCEDURE — 63600175 PHARM REV CODE 636 W HCPCS: Performed by: STUDENT IN AN ORGANIZED HEALTH CARE EDUCATION/TRAINING PROGRAM

## 2021-03-10 PROCEDURE — 80053 COMPREHEN METABOLIC PANEL: CPT | Performed by: EMERGENCY MEDICINE

## 2021-03-10 PROCEDURE — 99999 PR PBB SHADOW E&M-EST. PATIENT-LVL IV: ICD-10-PCS | Mod: PBBFAC,,, | Performed by: FAMILY MEDICINE

## 2021-03-10 PROCEDURE — U0002 COVID-19 LAB TEST NON-CDC: HCPCS | Performed by: EMERGENCY MEDICINE

## 2021-03-10 PROCEDURE — 99215 OFFICE O/P EST HI 40 MIN: CPT | Mod: S$PBB,,, | Performed by: FAMILY MEDICINE

## 2021-03-10 RX ORDER — SODIUM CHLORIDE 0.9 % (FLUSH) 0.9 %
10 SYRINGE (ML) INJECTION
Status: DISCONTINUED | OUTPATIENT
Start: 2021-03-10 | End: 2021-03-12 | Stop reason: HOSPADM

## 2021-03-10 RX ORDER — FAMOTIDINE 10 MG/ML
20 INJECTION INTRAVENOUS 2 TIMES DAILY
Status: DISCONTINUED | OUTPATIENT
Start: 2021-03-10 | End: 2021-03-12 | Stop reason: HOSPADM

## 2021-03-10 RX ORDER — TALC
6 POWDER (GRAM) TOPICAL NIGHTLY PRN
Status: DISCONTINUED | OUTPATIENT
Start: 2021-03-10 | End: 2021-03-12 | Stop reason: HOSPADM

## 2021-03-10 RX ORDER — ACETAMINOPHEN, DIPHENHYDRAMINE HCL, PHENYLEPHRINE HCL 325; 25; 5 MG/1; MG/1; MG/1
30 TABLET ORAL NIGHTLY PRN
COMMUNITY
End: 2021-05-27 | Stop reason: ALTCHOICE

## 2021-03-10 RX ORDER — IBUPROFEN 200 MG
16 TABLET ORAL
Status: DISCONTINUED | OUTPATIENT
Start: 2021-03-10 | End: 2021-03-12 | Stop reason: HOSPADM

## 2021-03-10 RX ORDER — GLUCAGON 1 MG
1 KIT INJECTION
Status: DISCONTINUED | OUTPATIENT
Start: 2021-03-10 | End: 2021-03-12 | Stop reason: HOSPADM

## 2021-03-10 RX ORDER — GABAPENTIN 400 MG/1
800 CAPSULE ORAL NIGHTLY PRN
Status: DISCONTINUED | OUTPATIENT
Start: 2021-03-10 | End: 2021-03-12 | Stop reason: HOSPADM

## 2021-03-10 RX ORDER — MAGNESIUM SULFATE HEPTAHYDRATE 40 MG/ML
4 INJECTION, SOLUTION INTRAVENOUS
Status: DISCONTINUED | OUTPATIENT
Start: 2021-03-10 | End: 2021-03-12 | Stop reason: HOSPADM

## 2021-03-10 RX ORDER — POTASSIUM CHLORIDE 7.45 MG/ML
40 INJECTION INTRAVENOUS
Status: DISCONTINUED | OUTPATIENT
Start: 2021-03-10 | End: 2021-03-12 | Stop reason: HOSPADM

## 2021-03-10 RX ORDER — MIDODRINE HYDROCHLORIDE 2.5 MG/1
5 TABLET ORAL ONCE
Status: COMPLETED | OUTPATIENT
Start: 2021-03-11 | End: 2021-03-10

## 2021-03-10 RX ORDER — IBUPROFEN 200 MG
24 TABLET ORAL
Status: DISCONTINUED | OUTPATIENT
Start: 2021-03-10 | End: 2021-03-12 | Stop reason: HOSPADM

## 2021-03-10 RX ORDER — HYDROCODONE BITARTRATE AND ACETAMINOPHEN 10; 325 MG/1; MG/1
1 TABLET ORAL EVERY 6 HOURS PRN
Status: DISCONTINUED | OUTPATIENT
Start: 2021-03-10 | End: 2021-03-12 | Stop reason: HOSPADM

## 2021-03-10 RX ORDER — ACETAMINOPHEN 500 MG
1000 TABLET ORAL EVERY 8 HOURS PRN
Status: DISCONTINUED | OUTPATIENT
Start: 2021-03-10 | End: 2021-03-12 | Stop reason: HOSPADM

## 2021-03-10 RX ORDER — POTASSIUM CHLORIDE 7.45 MG/ML
60 INJECTION INTRAVENOUS
Status: DISCONTINUED | OUTPATIENT
Start: 2021-03-10 | End: 2021-03-12 | Stop reason: HOSPADM

## 2021-03-10 RX ORDER — POTASSIUM CHLORIDE 20 MEQ/1
40 TABLET, EXTENDED RELEASE ORAL
Status: COMPLETED | OUTPATIENT
Start: 2021-03-10 | End: 2021-03-10

## 2021-03-10 RX ORDER — ONDANSETRON 2 MG/ML
4 INJECTION INTRAMUSCULAR; INTRAVENOUS EVERY 8 HOURS PRN
Status: DISCONTINUED | OUTPATIENT
Start: 2021-03-10 | End: 2021-03-12 | Stop reason: HOSPADM

## 2021-03-10 RX ORDER — ONDANSETRON 2 MG/ML
4 INJECTION INTRAMUSCULAR; INTRAVENOUS
Status: COMPLETED | OUTPATIENT
Start: 2021-03-10 | End: 2021-03-10

## 2021-03-10 RX ORDER — MAGNESIUM SULFATE HEPTAHYDRATE 40 MG/ML
2 INJECTION, SOLUTION INTRAVENOUS
Status: DISCONTINUED | OUTPATIENT
Start: 2021-03-10 | End: 2021-03-12 | Stop reason: HOSPADM

## 2021-03-10 RX ORDER — POTASSIUM CHLORIDE 7.45 MG/ML
80 INJECTION INTRAVENOUS
Status: DISCONTINUED | OUTPATIENT
Start: 2021-03-10 | End: 2021-03-12 | Stop reason: HOSPADM

## 2021-03-10 RX ORDER — HYDROCODONE BITARTRATE AND ACETAMINOPHEN 10; 325 MG/1; MG/1
1 TABLET ORAL 4 TIMES DAILY PRN
COMMUNITY
Start: 2021-02-27 | End: 2022-02-17

## 2021-03-10 RX ORDER — PROMETHAZINE HYDROCHLORIDE 12.5 MG/1
12.5 TABLET ORAL 2 TIMES DAILY PRN
COMMUNITY
Start: 2020-12-31 | End: 2021-05-27 | Stop reason: ALTCHOICE

## 2021-03-10 RX ORDER — TIZANIDINE 4 MG/1
4 TABLET ORAL EVERY 8 HOURS PRN
Status: DISCONTINUED | OUTPATIENT
Start: 2021-03-10 | End: 2021-03-12 | Stop reason: HOSPADM

## 2021-03-10 RX ADMIN — POTASSIUM CHLORIDE 40 MEQ: 1500 TABLET, EXTENDED RELEASE ORAL at 12:03

## 2021-03-10 RX ADMIN — ACETAMINOPHEN 1000 MG: 500 TABLET ORAL at 11:03

## 2021-03-10 RX ADMIN — FAMOTIDINE 20 MG: 10 INJECTION INTRAVENOUS at 08:03

## 2021-03-10 RX ADMIN — MIDODRINE HYDROCHLORIDE 5 MG: 2.5 TABLET ORAL at 11:03

## 2021-03-10 RX ADMIN — SODIUM CHLORIDE 1000 ML: 0.9 INJECTION, SOLUTION INTRAVENOUS at 10:03

## 2021-03-10 RX ADMIN — SODIUM CHLORIDE 500 ML: 0.9 INJECTION, SOLUTION INTRAVENOUS at 11:03

## 2021-03-10 RX ADMIN — POTASSIUM CHLORIDE 125 ML/HR: 149 INJECTION, SOLUTION, CONCENTRATE INTRAVENOUS at 05:03

## 2021-03-10 RX ADMIN — ONDANSETRON 4 MG: 2 INJECTION INTRAMUSCULAR; INTRAVENOUS at 10:03

## 2021-03-10 RX ADMIN — SODIUM CHLORIDE 500 ML: 0.9 INJECTION, SOLUTION INTRAVENOUS at 08:03

## 2021-03-10 RX ADMIN — TIZANIDINE 4 MG: 4 TABLET ORAL at 06:03

## 2021-03-10 RX ADMIN — HYDROCODONE BITARTRATE AND ACETAMINOPHEN 1 TABLET: 10; 325 TABLET ORAL at 06:03

## 2021-03-11 ENCOUNTER — ANESTHESIA EVENT (OUTPATIENT)
Dept: SURGERY | Facility: HOSPITAL | Age: 51
End: 2021-03-11
Payer: MEDICARE

## 2021-03-11 ENCOUNTER — ANESTHESIA (OUTPATIENT)
Dept: SURGERY | Facility: HOSPITAL | Age: 51
End: 2021-03-11
Payer: MEDICARE

## 2021-03-11 PROBLEM — E83.39 HYPOPHOSPHATEMIA: Status: ACTIVE | Noted: 2021-03-11

## 2021-03-11 PROBLEM — E88.09 HYPOALBUMINEMIA DUE TO PROTEIN-CALORIE MALNUTRITION: Status: ACTIVE | Noted: 2021-03-11

## 2021-03-11 PROBLEM — K80.00 ACUTE CALCULOUS CHOLECYSTITIS: Status: ACTIVE | Noted: 2021-03-11

## 2021-03-11 PROBLEM — E46 HYPOALBUMINEMIA DUE TO PROTEIN-CALORIE MALNUTRITION: Status: ACTIVE | Noted: 2021-03-11

## 2021-03-11 LAB
ALBUMIN SERPL BCP-MCNC: 2.6 G/DL (ref 3.5–5.2)
ALP SERPL-CCNC: 138 U/L (ref 55–135)
ALT SERPL W/O P-5'-P-CCNC: 13 U/L (ref 10–44)
ANION GAP SERPL CALC-SCNC: 6 MMOL/L (ref 8–16)
AST SERPL-CCNC: 14 U/L (ref 10–40)
BASOPHILS # BLD AUTO: 0.11 K/UL (ref 0–0.2)
BASOPHILS NFR BLD: 1.6 % (ref 0–1.9)
BILIRUB SERPL-MCNC: 0.2 MG/DL (ref 0.1–1)
BUN SERPL-MCNC: 7 MG/DL (ref 6–20)
CALCIUM SERPL-MCNC: 7.9 MG/DL (ref 8.7–10.5)
CHLORIDE SERPL-SCNC: 120 MMOL/L (ref 95–110)
CO2 SERPL-SCNC: 17 MMOL/L (ref 23–29)
CREAT SERPL-MCNC: 0.6 MG/DL (ref 0.5–1.4)
DIFFERENTIAL METHOD: ABNORMAL
EOSINOPHIL # BLD AUTO: 0.2 K/UL (ref 0–0.5)
EOSINOPHIL NFR BLD: 2.1 % (ref 0–8)
ERYTHROCYTE [DISTWIDTH] IN BLOOD BY AUTOMATED COUNT: 14.7 % (ref 11.5–14.5)
EST. GFR  (AFRICAN AMERICAN): >60 ML/MIN/1.73 M^2
EST. GFR  (NON AFRICAN AMERICAN): >60 ML/MIN/1.73 M^2
FOLATE SERPL-MCNC: 7.5 NG/ML (ref 4–24)
GLUCOSE SERPL-MCNC: 62 MG/DL (ref 70–110)
HCT VFR BLD AUTO: 29.7 % (ref 37–48.5)
HGB BLD-MCNC: 9.6 G/DL (ref 12–16)
IMM GRANULOCYTES # BLD AUTO: 0.06 K/UL (ref 0–0.04)
IMM GRANULOCYTES NFR BLD AUTO: 0.9 % (ref 0–0.5)
LYMPHOCYTES # BLD AUTO: 1.9 K/UL (ref 1–4.8)
LYMPHOCYTES NFR BLD: 27.4 % (ref 18–48)
MAGNESIUM SERPL-MCNC: 1.9 MG/DL (ref 1.6–2.6)
MCH RBC QN AUTO: 33 PG (ref 27–31)
MCHC RBC AUTO-ENTMCNC: 32.3 G/DL (ref 32–36)
MCV RBC AUTO: 102 FL (ref 82–98)
MONOCYTES # BLD AUTO: 0.7 K/UL (ref 0.3–1)
MONOCYTES NFR BLD: 9.5 % (ref 4–15)
NEUTROPHILS # BLD AUTO: 4.1 K/UL (ref 1.8–7.7)
NEUTROPHILS NFR BLD: 58.5 % (ref 38–73)
NRBC BLD-RTO: 0 /100 WBC
PHOSPHATE SERPL-MCNC: 1.6 MG/DL (ref 2.7–4.5)
PLATELET # BLD AUTO: 552 K/UL (ref 150–350)
PMV BLD AUTO: 9 FL (ref 9.2–12.9)
POTASSIUM SERPL-SCNC: 4 MMOL/L (ref 3.5–5.1)
PROT SERPL-MCNC: 4.9 G/DL (ref 6–8.4)
RBC # BLD AUTO: 2.91 M/UL (ref 4–5.4)
SODIUM SERPL-SCNC: 143 MMOL/L (ref 136–145)
TSH SERPL DL<=0.005 MIU/L-ACNC: 1.69 UIU/ML (ref 0.4–4)
VIT B12 SERPL-MCNC: 731 PG/ML (ref 210–950)
WBC # BLD AUTO: 7.04 K/UL (ref 3.9–12.7)

## 2021-03-11 PROCEDURE — 94760 N-INVAS EAR/PLS OXIMETRY 1: CPT

## 2021-03-11 PROCEDURE — 63600175 PHARM REV CODE 636 W HCPCS: Performed by: NURSE ANESTHETIST, CERTIFIED REGISTERED

## 2021-03-11 PROCEDURE — 25000003 PHARM REV CODE 250: Performed by: SURGERY

## 2021-03-11 PROCEDURE — 83735 ASSAY OF MAGNESIUM: CPT | Performed by: STUDENT IN AN ORGANIZED HEALTH CARE EDUCATION/TRAINING PROGRAM

## 2021-03-11 PROCEDURE — 84100 ASSAY OF PHOSPHORUS: CPT | Performed by: STUDENT IN AN ORGANIZED HEALTH CARE EDUCATION/TRAINING PROGRAM

## 2021-03-11 PROCEDURE — 99900103 DSU ONLY-NO CHARGE-INITIAL HR (STAT): Performed by: SURGERY

## 2021-03-11 PROCEDURE — 99204 OFFICE O/P NEW MOD 45 MIN: CPT | Mod: 57,,, | Performed by: SURGERY

## 2021-03-11 PROCEDURE — D9220A PRA ANESTHESIA: ICD-10-PCS | Mod: CRNA,,, | Performed by: NURSE ANESTHETIST, CERTIFIED REGISTERED

## 2021-03-11 PROCEDURE — G0378 HOSPITAL OBSERVATION PER HR: HCPCS

## 2021-03-11 PROCEDURE — 36000709 HC OR TIME LEV III EA ADD 15 MIN: Performed by: SURGERY

## 2021-03-11 PROCEDURE — 88304 TISSUE EXAM BY PATHOLOGIST: CPT | Mod: 26,,, | Performed by: PATHOLOGY

## 2021-03-11 PROCEDURE — 63600175 PHARM REV CODE 636 W HCPCS: Performed by: ANESTHESIOLOGY

## 2021-03-11 PROCEDURE — 94799 UNLISTED PULMONARY SVC/PX: CPT

## 2021-03-11 PROCEDURE — 27201423 OPTIME MED/SURG SUP & DEVICES STERILE SUPPLY: Performed by: SURGERY

## 2021-03-11 PROCEDURE — 99900104 DSU ONLY-NO CHARGE-EA ADD'L HR (STAT): Performed by: SURGERY

## 2021-03-11 PROCEDURE — 25000003 PHARM REV CODE 250: Performed by: NURSE PRACTITIONER

## 2021-03-11 PROCEDURE — D9220A PRA ANESTHESIA: Mod: CRNA,,, | Performed by: NURSE ANESTHETIST, CERTIFIED REGISTERED

## 2021-03-11 PROCEDURE — D9220A PRA ANESTHESIA: ICD-10-PCS | Mod: ANES,,, | Performed by: ANESTHESIOLOGY

## 2021-03-11 PROCEDURE — 63600175 PHARM REV CODE 636 W HCPCS: Performed by: STUDENT IN AN ORGANIZED HEALTH CARE EDUCATION/TRAINING PROGRAM

## 2021-03-11 PROCEDURE — 88304 PR  SURG PATH,LEVEL III: ICD-10-PCS | Mod: 26,,, | Performed by: PATHOLOGY

## 2021-03-11 PROCEDURE — D9220A PRA ANESTHESIA: Mod: ANES,,, | Performed by: ANESTHESIOLOGY

## 2021-03-11 PROCEDURE — 96366 THER/PROPH/DIAG IV INF ADDON: CPT | Mod: 59

## 2021-03-11 PROCEDURE — 88304 TISSUE EXAM BY PATHOLOGIST: CPT | Performed by: PATHOLOGY

## 2021-03-11 PROCEDURE — 63600175 PHARM REV CODE 636 W HCPCS: Performed by: SURGERY

## 2021-03-11 PROCEDURE — 37000008 HC ANESTHESIA 1ST 15 MINUTES: Performed by: SURGERY

## 2021-03-11 PROCEDURE — 37000009 HC ANESTHESIA EA ADD 15 MINS: Performed by: SURGERY

## 2021-03-11 PROCEDURE — 85025 COMPLETE CBC W/AUTO DIFF WBC: CPT | Performed by: STUDENT IN AN ORGANIZED HEALTH CARE EDUCATION/TRAINING PROGRAM

## 2021-03-11 PROCEDURE — 82746 ASSAY OF FOLIC ACID SERUM: CPT | Performed by: STUDENT IN AN ORGANIZED HEALTH CARE EDUCATION/TRAINING PROGRAM

## 2021-03-11 PROCEDURE — 25000003 PHARM REV CODE 250: Performed by: STUDENT IN AN ORGANIZED HEALTH CARE EDUCATION/TRAINING PROGRAM

## 2021-03-11 PROCEDURE — 97161 PT EVAL LOW COMPLEX 20 MIN: CPT

## 2021-03-11 PROCEDURE — 25000003 PHARM REV CODE 250: Performed by: ANESTHESIOLOGY

## 2021-03-11 PROCEDURE — 36415 COLL VENOUS BLD VENIPUNCTURE: CPT | Performed by: STUDENT IN AN ORGANIZED HEALTH CARE EDUCATION/TRAINING PROGRAM

## 2021-03-11 PROCEDURE — 47562 LAPAROSCOPIC CHOLECYSTECTOMY: CPT | Mod: ,,, | Performed by: SURGERY

## 2021-03-11 PROCEDURE — 71000033 HC RECOVERY, INTIAL HOUR: Performed by: SURGERY

## 2021-03-11 PROCEDURE — 99204 PR OFFICE/OUTPT VISIT, NEW, LEVL IV, 45-59 MIN: ICD-10-PCS | Mod: 57,,, | Performed by: SURGERY

## 2021-03-11 PROCEDURE — 82607 VITAMIN B-12: CPT | Performed by: STUDENT IN AN ORGANIZED HEALTH CARE EDUCATION/TRAINING PROGRAM

## 2021-03-11 PROCEDURE — 96376 TX/PRO/DX INJ SAME DRUG ADON: CPT

## 2021-03-11 PROCEDURE — 84443 ASSAY THYROID STIM HORMONE: CPT | Performed by: STUDENT IN AN ORGANIZED HEALTH CARE EDUCATION/TRAINING PROGRAM

## 2021-03-11 PROCEDURE — 80053 COMPREHEN METABOLIC PANEL: CPT | Performed by: STUDENT IN AN ORGANIZED HEALTH CARE EDUCATION/TRAINING PROGRAM

## 2021-03-11 PROCEDURE — 36000708 HC OR TIME LEV III 1ST 15 MIN: Performed by: SURGERY

## 2021-03-11 PROCEDURE — 47562 PR LAP,CHOLECYSTECTOMY: ICD-10-PCS | Mod: ,,, | Performed by: SURGERY

## 2021-03-11 PROCEDURE — 25000003 PHARM REV CODE 250: Performed by: NURSE ANESTHETIST, CERTIFIED REGISTERED

## 2021-03-11 PROCEDURE — 71000039 HC RECOVERY, EACH ADD'L HOUR: Performed by: SURGERY

## 2021-03-11 PROCEDURE — 96365 THER/PROPH/DIAG IV INF INIT: CPT | Mod: 59

## 2021-03-11 RX ORDER — ROCURONIUM BROMIDE 10 MG/ML
INJECTION, SOLUTION INTRAVENOUS
Status: DISCONTINUED | OUTPATIENT
Start: 2021-03-11 | End: 2021-03-11

## 2021-03-11 RX ORDER — FENTANYL CITRATE 50 UG/ML
25 INJECTION, SOLUTION INTRAMUSCULAR; INTRAVENOUS EVERY 5 MIN PRN
Status: DISCONTINUED | OUTPATIENT
Start: 2021-03-11 | End: 2021-03-11 | Stop reason: HOSPADM

## 2021-03-11 RX ORDER — DEXAMETHASONE SODIUM PHOSPHATE 4 MG/ML
INJECTION, SOLUTION INTRA-ARTICULAR; INTRALESIONAL; INTRAMUSCULAR; INTRAVENOUS; SOFT TISSUE
Status: DISCONTINUED | OUTPATIENT
Start: 2021-03-11 | End: 2021-03-11

## 2021-03-11 RX ORDER — SODIUM CHLORIDE 9 MG/ML
INJECTION, SOLUTION INTRAVENOUS CONTINUOUS
Status: DISCONTINUED | OUTPATIENT
Start: 2021-03-11 | End: 2021-03-12

## 2021-03-11 RX ORDER — MUPIROCIN 20 MG/G
1 OINTMENT TOPICAL 2 TIMES DAILY
Status: DISCONTINUED | OUTPATIENT
Start: 2021-03-11 | End: 2021-03-12 | Stop reason: HOSPADM

## 2021-03-11 RX ORDER — ONDANSETRON 2 MG/ML
4 INJECTION INTRAMUSCULAR; INTRAVENOUS ONCE AS NEEDED
Status: DISCONTINUED | OUTPATIENT
Start: 2021-03-11 | End: 2021-03-11 | Stop reason: HOSPADM

## 2021-03-11 RX ORDER — DIPHENHYDRAMINE HYDROCHLORIDE 50 MG/ML
25 INJECTION INTRAMUSCULAR; INTRAVENOUS EVERY 6 HOURS PRN
Status: DISCONTINUED | OUTPATIENT
Start: 2021-03-11 | End: 2021-03-11 | Stop reason: HOSPADM

## 2021-03-11 RX ORDER — SODIUM CHLORIDE 0.9 % (FLUSH) 0.9 %
3 SYRINGE (ML) INJECTION
Status: DISCONTINUED | OUTPATIENT
Start: 2021-03-11 | End: 2021-03-11 | Stop reason: HOSPADM

## 2021-03-11 RX ORDER — FENTANYL CITRATE 50 UG/ML
INJECTION, SOLUTION INTRAMUSCULAR; INTRAVENOUS
Status: DISCONTINUED | OUTPATIENT
Start: 2021-03-11 | End: 2021-03-11

## 2021-03-11 RX ORDER — MIDAZOLAM HYDROCHLORIDE 1 MG/ML
INJECTION INTRAMUSCULAR; INTRAVENOUS
Status: DISCONTINUED | OUTPATIENT
Start: 2021-03-11 | End: 2021-03-11

## 2021-03-11 RX ORDER — BUPIVACAINE HYDROCHLORIDE AND EPINEPHRINE 2.5; 5 MG/ML; UG/ML
INJECTION, SOLUTION EPIDURAL; INFILTRATION; INTRACAUDAL; PERINEURAL
Status: DISCONTINUED | OUTPATIENT
Start: 2021-03-11 | End: 2021-03-11 | Stop reason: HOSPADM

## 2021-03-11 RX ORDER — ONDANSETRON 4 MG/1
8 TABLET, ORALLY DISINTEGRATING ORAL EVERY 8 HOURS PRN
Status: DISCONTINUED | OUTPATIENT
Start: 2021-03-11 | End: 2021-03-12 | Stop reason: HOSPADM

## 2021-03-11 RX ORDER — PROPOFOL 10 MG/ML
VIAL (ML) INTRAVENOUS
Status: DISCONTINUED | OUTPATIENT
Start: 2021-03-11 | End: 2021-03-11

## 2021-03-11 RX ORDER — ONDANSETRON 2 MG/ML
4 INJECTION INTRAMUSCULAR; INTRAVENOUS EVERY 12 HOURS PRN
Status: DISCONTINUED | OUTPATIENT
Start: 2021-03-11 | End: 2021-03-12 | Stop reason: HOSPADM

## 2021-03-11 RX ORDER — LIDOCAINE HCL/PF 100 MG/5ML
SYRINGE (ML) INTRAVENOUS
Status: DISCONTINUED | OUTPATIENT
Start: 2021-03-11 | End: 2021-03-11

## 2021-03-11 RX ORDER — SODIUM,POTASSIUM PHOSPHATES 280-250MG
1 POWDER IN PACKET (EA) ORAL
Status: DISCONTINUED | OUTPATIENT
Start: 2021-03-11 | End: 2021-03-12 | Stop reason: HOSPADM

## 2021-03-11 RX ORDER — HYDROMORPHONE HYDROCHLORIDE 2 MG/ML
0.2 INJECTION, SOLUTION INTRAMUSCULAR; INTRAVENOUS; SUBCUTANEOUS EVERY 5 MIN PRN
Status: DISCONTINUED | OUTPATIENT
Start: 2021-03-11 | End: 2021-03-11 | Stop reason: HOSPADM

## 2021-03-11 RX ORDER — SODIUM CHLORIDE, SODIUM LACTATE, POTASSIUM CHLORIDE, CALCIUM CHLORIDE 600; 310; 30; 20 MG/100ML; MG/100ML; MG/100ML; MG/100ML
75 INJECTION, SOLUTION INTRAVENOUS CONTINUOUS
Status: DISCONTINUED | OUTPATIENT
Start: 2021-03-11 | End: 2021-03-11

## 2021-03-11 RX ORDER — OXYCODONE HYDROCHLORIDE 5 MG/1
5 TABLET ORAL
Status: DISCONTINUED | OUTPATIENT
Start: 2021-03-11 | End: 2021-03-11 | Stop reason: HOSPADM

## 2021-03-11 RX ORDER — ONDANSETRON HYDROCHLORIDE 2 MG/ML
INJECTION, SOLUTION INTRAMUSCULAR; INTRAVENOUS
Status: DISCONTINUED | OUTPATIENT
Start: 2021-03-11 | End: 2021-03-11

## 2021-03-11 RX ADMIN — SODIUM CHLORIDE, SODIUM GLUCONATE, SODIUM ACETATE, POTASSIUM CHLORIDE, MAGNESIUM CHLORIDE, SODIUM PHOSPHATE, DIBASIC, AND POTASSIUM PHOSPHATE: .53; .5; .37; .037; .03; .012; .00082 INJECTION, SOLUTION INTRAVENOUS at 03:03

## 2021-03-11 RX ADMIN — PIPERACILLIN AND TAZOBACTAM 4.5 G: 4; .5 INJECTION, POWDER, LYOPHILIZED, FOR SOLUTION INTRAVENOUS; PARENTERAL at 03:03

## 2021-03-11 RX ADMIN — ONDANSETRON 4 MG: 2 INJECTION INTRAMUSCULAR; INTRAVENOUS at 03:03

## 2021-03-11 RX ADMIN — MUPIROCIN 1 G: 20 OINTMENT TOPICAL at 08:03

## 2021-03-11 RX ADMIN — POTASSIUM & SODIUM PHOSPHATES POWDER PACK 280-160-250 MG 1 PACKET: 280-160-250 PACK at 10:03

## 2021-03-11 RX ADMIN — HYDROMORPHONE HYDROCHLORIDE 0.2 MG: 2 INJECTION INTRAMUSCULAR; INTRAVENOUS; SUBCUTANEOUS at 05:03

## 2021-03-11 RX ADMIN — PIPERACILLIN AND TAZOBACTAM 4.5 G: 4; .5 INJECTION, POWDER, LYOPHILIZED, FOR SOLUTION INTRAVENOUS; PARENTERAL at 11:03

## 2021-03-11 RX ADMIN — GLYCOPYRROLATE 0.1 MG: 0.2 INJECTION, SOLUTION INTRAMUSCULAR; INTRAVITREAL at 03:03

## 2021-03-11 RX ADMIN — SODIUM CHLORIDE 125 ML/HR: 0.9 INJECTION, SOLUTION INTRAVENOUS at 08:03

## 2021-03-11 RX ADMIN — ESMOLOL HYDROCHLORIDE 1 MCG/KG/MIN: 20 INJECTION INTRAVENOUS at 04:03

## 2021-03-11 RX ADMIN — FAMOTIDINE 20 MG: 10 INJECTION INTRAVENOUS at 08:03

## 2021-03-11 RX ADMIN — HYDROCODONE BITARTRATE AND ACETAMINOPHEN 1 TABLET: 10; 325 TABLET ORAL at 10:03

## 2021-03-11 RX ADMIN — PROPOFOL 100 MG: 10 INJECTION, EMULSION INTRAVENOUS at 03:03

## 2021-03-11 RX ADMIN — TIZANIDINE 4 MG: 4 TABLET ORAL at 10:03

## 2021-03-11 RX ADMIN — ONDANSETRON 8 MG: 4 TABLET, ORALLY DISINTEGRATING ORAL at 08:03

## 2021-03-11 RX ADMIN — HYDROMORPHONE HYDROCHLORIDE 0.2 MG: 2 INJECTION INTRAMUSCULAR; INTRAVENOUS; SUBCUTANEOUS at 06:03

## 2021-03-11 RX ADMIN — MIDAZOLAM HYDROCHLORIDE 2 MG: 1 INJECTION, SOLUTION INTRAMUSCULAR; INTRAVENOUS at 03:03

## 2021-03-11 RX ADMIN — FENTANYL CITRATE 50 MCG: 50 INJECTION, SOLUTION INTRAMUSCULAR; INTRAVENOUS at 04:03

## 2021-03-11 RX ADMIN — FENTANYL CITRATE 50 MCG: 50 INJECTION, SOLUTION INTRAMUSCULAR; INTRAVENOUS at 03:03

## 2021-03-11 RX ADMIN — HYDROCODONE BITARTRATE AND ACETAMINOPHEN 1 TABLET: 10; 325 TABLET ORAL at 03:03

## 2021-03-11 RX ADMIN — SODIUM CHLORIDE, SODIUM GLUCONATE, SODIUM ACETATE, POTASSIUM CHLORIDE, MAGNESIUM CHLORIDE, SODIUM PHOSPHATE, DIBASIC, AND POTASSIUM PHOSPHATE: .53; .5; .37; .037; .03; .012; .00082 INJECTION, SOLUTION INTRAVENOUS at 05:03

## 2021-03-11 RX ADMIN — LIDOCAINE HYDROCHLORIDE 50 MG: 20 INJECTION, SOLUTION INTRAVENOUS at 03:03

## 2021-03-11 RX ADMIN — FAMOTIDINE 20 MG: 10 INJECTION INTRAVENOUS at 07:03

## 2021-03-11 RX ADMIN — HYDROCODONE BITARTRATE AND ACETAMINOPHEN 1 TABLET: 10; 325 TABLET ORAL at 09:03

## 2021-03-11 RX ADMIN — DEXAMETHASONE SODIUM PHOSPHATE 4 MG: 4 INJECTION, SOLUTION INTRA-ARTICULAR; INTRALESIONAL; INTRAMUSCULAR; INTRAVENOUS; SOFT TISSUE at 03:03

## 2021-03-11 RX ADMIN — POTASSIUM CHLORIDE 125 ML/HR: 149 INJECTION, SOLUTION, CONCENTRATE INTRAVENOUS at 03:03

## 2021-03-11 RX ADMIN — ONDANSETRON 4 MG: 2 INJECTION, SOLUTION INTRAMUSCULAR; INTRAVENOUS at 03:03

## 2021-03-11 RX ADMIN — ROCURONIUM BROMIDE 30 MG: 10 INJECTION, SOLUTION INTRAVENOUS at 03:03

## 2021-03-12 VITALS
HEART RATE: 84 BPM | OXYGEN SATURATION: 99 % | HEIGHT: 60 IN | TEMPERATURE: 97 F | BODY MASS INDEX: 22.19 KG/M2 | SYSTOLIC BLOOD PRESSURE: 110 MMHG | WEIGHT: 113 LBS | DIASTOLIC BLOOD PRESSURE: 65 MMHG | RESPIRATION RATE: 19 BRPM

## 2021-03-12 PROBLEM — R11.2 NAUSEA AND VOMITING: Status: RESOLVED | Noted: 2021-03-10 | Resolved: 2021-03-12

## 2021-03-12 PROBLEM — K80.00 ACUTE CALCULOUS CHOLECYSTITIS: Status: RESOLVED | Noted: 2021-03-11 | Resolved: 2021-03-12

## 2021-03-12 LAB
ALBUMIN SERPL BCP-MCNC: 2.8 G/DL (ref 3.5–5.2)
ALP SERPL-CCNC: 130 U/L (ref 55–135)
ALT SERPL W/O P-5'-P-CCNC: 14 U/L (ref 10–44)
ANION GAP SERPL CALC-SCNC: 10 MMOL/L (ref 8–16)
AST SERPL-CCNC: 15 U/L (ref 10–40)
BASOPHILS NFR BLD: 0 % (ref 0–1.9)
BILIRUB SERPL-MCNC: 0.2 MG/DL (ref 0.1–1)
BUN SERPL-MCNC: 4 MG/DL (ref 6–20)
CALCIUM SERPL-MCNC: 8.1 MG/DL (ref 8.7–10.5)
CHLORIDE SERPL-SCNC: 113 MMOL/L (ref 95–110)
CO2 SERPL-SCNC: 17 MMOL/L (ref 23–29)
CREAT SERPL-MCNC: 0.6 MG/DL (ref 0.5–1.4)
DIFFERENTIAL METHOD: ABNORMAL
EOSINOPHIL NFR BLD: 0 % (ref 0–8)
ERYTHROCYTE [DISTWIDTH] IN BLOOD BY AUTOMATED COUNT: 14.7 % (ref 11.5–14.5)
EST. GFR  (AFRICAN AMERICAN): >60 ML/MIN/1.73 M^2
EST. GFR  (NON AFRICAN AMERICAN): >60 ML/MIN/1.73 M^2
GLUCOSE SERPL-MCNC: 113 MG/DL (ref 70–110)
HCT VFR BLD AUTO: 28.5 % (ref 37–48.5)
HGB BLD-MCNC: 9.3 G/DL (ref 12–16)
IMM GRANULOCYTES # BLD AUTO: ABNORMAL K/UL (ref 0–0.04)
IMM GRANULOCYTES NFR BLD AUTO: ABNORMAL % (ref 0–0.5)
LYMPHOCYTES NFR BLD: 0 % (ref 18–48)
MAGNESIUM SERPL-MCNC: 2 MG/DL (ref 1.6–2.6)
MCH RBC QN AUTO: 33.3 PG (ref 27–31)
MCHC RBC AUTO-ENTMCNC: 32.6 G/DL (ref 32–36)
MCV RBC AUTO: 102 FL (ref 82–98)
MONOCYTES NFR BLD: 4 % (ref 4–15)
NEUTROPHILS NFR BLD: 96 % (ref 38–73)
NRBC BLD-RTO: 0 /100 WBC
PHOSPHATE SERPL-MCNC: 2.6 MG/DL (ref 2.7–4.5)
PLATELET # BLD AUTO: 483 K/UL (ref 150–350)
PMV BLD AUTO: 8.8 FL (ref 9.2–12.9)
POTASSIUM SERPL-SCNC: 3.8 MMOL/L (ref 3.5–5.1)
PROT SERPL-MCNC: 5.1 G/DL (ref 6–8.4)
RBC # BLD AUTO: 2.79 M/UL (ref 4–5.4)
SODIUM SERPL-SCNC: 140 MMOL/L (ref 136–145)
WBC # BLD AUTO: 27.3 K/UL (ref 3.9–12.7)

## 2021-03-12 PROCEDURE — 25000003 PHARM REV CODE 250: Performed by: SURGERY

## 2021-03-12 PROCEDURE — 94761 N-INVAS EAR/PLS OXIMETRY MLT: CPT

## 2021-03-12 PROCEDURE — 84100 ASSAY OF PHOSPHORUS: CPT | Performed by: SURGERY

## 2021-03-12 PROCEDURE — 36415 COLL VENOUS BLD VENIPUNCTURE: CPT | Performed by: SURGERY

## 2021-03-12 PROCEDURE — 94760 N-INVAS EAR/PLS OXIMETRY 1: CPT

## 2021-03-12 PROCEDURE — 80053 COMPREHEN METABOLIC PANEL: CPT | Performed by: SURGERY

## 2021-03-12 PROCEDURE — 96374 THER/PROPH/DIAG INJ IV PUSH: CPT | Mod: 59

## 2021-03-12 PROCEDURE — 85027 COMPLETE CBC AUTOMATED: CPT | Performed by: SURGERY

## 2021-03-12 PROCEDURE — G0378 HOSPITAL OBSERVATION PER HR: HCPCS

## 2021-03-12 PROCEDURE — 83735 ASSAY OF MAGNESIUM: CPT | Performed by: SURGERY

## 2021-03-12 PROCEDURE — 63600175 PHARM REV CODE 636 W HCPCS: Performed by: SURGERY

## 2021-03-12 PROCEDURE — 85007 BL SMEAR W/DIFF WBC COUNT: CPT | Mod: NCS | Performed by: SURGERY

## 2021-03-12 PROCEDURE — 94799 UNLISTED PULMONARY SVC/PX: CPT

## 2021-03-12 RX ADMIN — HYDROCODONE BITARTRATE AND ACETAMINOPHEN 1 TABLET: 10; 325 TABLET ORAL at 04:03

## 2021-03-12 RX ADMIN — POTASSIUM & SODIUM PHOSPHATES POWDER PACK 280-160-250 MG 1 PACKET: 280-160-250 PACK at 07:03

## 2021-03-12 RX ADMIN — PIPERACILLIN AND TAZOBACTAM 4.5 G: 4; .5 INJECTION, POWDER, LYOPHILIZED, FOR SOLUTION INTRAVENOUS; PARENTERAL at 07:03

## 2021-03-12 RX ADMIN — MUPIROCIN 1 G: 20 OINTMENT TOPICAL at 09:03

## 2021-03-12 RX ADMIN — FAMOTIDINE 20 MG: 10 INJECTION INTRAVENOUS at 09:03

## 2021-03-16 ENCOUNTER — TELEPHONE (OUTPATIENT)
Dept: FAMILY MEDICINE | Facility: CLINIC | Age: 51
End: 2021-03-16

## 2021-03-23 NOTE — TELEPHONE ENCOUNTER
----- Message from Colin Uriarte sent at 10/12/2020  9:51 AM CDT -----  Type: Needs Medical Advice    Who Called:  Patient  Best Call Back Number: [phone]  Additional Information: Patient would like to discuss receiving orders for mammogram. Please call to advise. Thanks!    
LM for patient to call clinic back  
Order placed. Please assist patient with scheduling  
Patient would like orders for mammogram please advise.  
English

## 2021-03-25 LAB
FINAL PATHOLOGIC DIAGNOSIS: NORMAL
GROSS: NORMAL
Lab: NORMAL

## 2021-03-30 ENCOUNTER — OFFICE VISIT (OUTPATIENT)
Dept: SURGERY | Facility: CLINIC | Age: 51
End: 2021-03-30
Payer: MEDICARE

## 2021-03-30 VITALS
SYSTOLIC BLOOD PRESSURE: 130 MMHG | HEIGHT: 60 IN | HEART RATE: 80 BPM | TEMPERATURE: 98 F | WEIGHT: 113 LBS | DIASTOLIC BLOOD PRESSURE: 79 MMHG | BODY MASS INDEX: 22.19 KG/M2 | RESPIRATION RATE: 20 BRPM

## 2021-03-30 DIAGNOSIS — K81.2 ACUTE ON CHRONIC CHOLECYSTITIS: Primary | ICD-10-CM

## 2021-03-30 PROCEDURE — 99024 POSTOP FOLLOW-UP VISIT: CPT | Mod: S$GLB,POP,, | Performed by: SURGERY

## 2021-03-30 PROCEDURE — 99024 PR POST-OP FOLLOW-UP VISIT: ICD-10-PCS | Mod: S$GLB,POP,, | Performed by: SURGERY

## 2021-03-30 RX ORDER — ONDANSETRON 4 MG/1
4 TABLET, FILM COATED ORAL 2 TIMES DAILY PRN
COMMUNITY
Start: 2021-03-15 | End: 2022-05-25

## 2021-03-30 RX ORDER — FLUTICASONE PROPIONATE 50 MCG
1 SPRAY, SUSPENSION (ML) NASAL 2 TIMES DAILY
COMMUNITY
Start: 2021-03-29 | End: 2021-10-12 | Stop reason: SDUPTHER

## 2021-03-30 RX ORDER — QUETIAPINE FUMARATE 25 MG/1
25 TABLET, FILM COATED ORAL NIGHTLY PRN
COMMUNITY
Start: 2021-01-22 | End: 2023-03-16 | Stop reason: ALTCHOICE

## 2021-04-29 ENCOUNTER — TELEPHONE (OUTPATIENT)
Dept: FAMILY MEDICINE | Facility: CLINIC | Age: 51
End: 2021-04-29

## 2021-05-26 ENCOUNTER — TELEPHONE (OUTPATIENT)
Dept: FAMILY MEDICINE | Facility: CLINIC | Age: 51
End: 2021-05-26

## 2021-05-27 ENCOUNTER — OFFICE VISIT (OUTPATIENT)
Dept: FAMILY MEDICINE | Facility: CLINIC | Age: 51
End: 2021-05-27
Payer: MEDICARE

## 2021-05-27 VITALS
HEART RATE: 75 BPM | BODY MASS INDEX: 21.51 KG/M2 | OXYGEN SATURATION: 94 % | SYSTOLIC BLOOD PRESSURE: 104 MMHG | TEMPERATURE: 98 F | DIASTOLIC BLOOD PRESSURE: 62 MMHG | HEIGHT: 60 IN | WEIGHT: 109.56 LBS

## 2021-05-27 DIAGNOSIS — J45.901 ASTHMA WITH ACUTE EXACERBATION, UNSPECIFIED ASTHMA SEVERITY, UNSPECIFIED WHETHER PERSISTENT: ICD-10-CM

## 2021-05-27 DIAGNOSIS — J01.90 ACUTE SINUSITIS, RECURRENCE NOT SPECIFIED, UNSPECIFIED LOCATION: Primary | ICD-10-CM

## 2021-05-27 PROCEDURE — 99999 PR PBB SHADOW E&M-EST. PATIENT-LVL III: CPT | Mod: PBBFAC,,, | Performed by: NURSE PRACTITIONER

## 2021-05-27 PROCEDURE — 99214 OFFICE O/P EST MOD 30 MIN: CPT | Mod: S$PBB,,, | Performed by: NURSE PRACTITIONER

## 2021-05-27 PROCEDURE — 99999 PR PBB SHADOW E&M-EST. PATIENT-LVL III: ICD-10-PCS | Mod: PBBFAC,,, | Performed by: NURSE PRACTITIONER

## 2021-05-27 PROCEDURE — 99214 PR OFFICE/OUTPT VISIT, EST, LEVL IV, 30-39 MIN: ICD-10-PCS | Mod: S$PBB,,, | Performed by: NURSE PRACTITIONER

## 2021-05-27 PROCEDURE — 99213 OFFICE O/P EST LOW 20 MIN: CPT | Mod: PBBFAC,PO | Performed by: NURSE PRACTITIONER

## 2021-05-27 RX ORDER — METHYLPREDNISOLONE 4 MG/1
TABLET ORAL
Qty: 1 PACKAGE | Refills: 0 | Status: SHIPPED | OUTPATIENT
Start: 2021-05-27 | End: 2021-10-12 | Stop reason: ALTCHOICE

## 2021-05-27 RX ORDER — AZITHROMYCIN 250 MG/1
TABLET, FILM COATED ORAL
Qty: 6 TABLET | Refills: 0 | Status: SHIPPED | OUTPATIENT
Start: 2021-05-27 | End: 2021-10-12 | Stop reason: ALTCHOICE

## 2021-06-01 ENCOUNTER — TELEPHONE (OUTPATIENT)
Dept: FAMILY MEDICINE | Facility: CLINIC | Age: 51
End: 2021-06-01

## 2021-06-01 RX ORDER — DOXYCYCLINE HYCLATE 100 MG
100 TABLET ORAL 2 TIMES DAILY
Qty: 20 TABLET | Refills: 0 | Status: SHIPPED | OUTPATIENT
Start: 2021-06-01 | End: 2021-10-12 | Stop reason: SDUPTHER

## 2021-07-09 ENCOUNTER — TELEPHONE (OUTPATIENT)
Dept: FAMILY MEDICINE | Facility: CLINIC | Age: 51
End: 2021-07-09

## 2021-08-17 ENCOUNTER — IMMUNIZATION (OUTPATIENT)
Dept: FAMILY MEDICINE | Facility: CLINIC | Age: 51
End: 2021-08-17
Payer: MEDICARE

## 2021-08-17 DIAGNOSIS — Z23 NEED FOR VACCINATION: Primary | ICD-10-CM

## 2021-08-17 PROCEDURE — 91301 COVID-19, MRNA, LNP-S, PF, 100 MCG/0.5 ML DOSE VACCINE: CPT | Mod: ,,, | Performed by: FAMILY MEDICINE

## 2021-08-17 PROCEDURE — 0011A COVID-19, MRNA, LNP-S, PF, 100 MCG/0.5 ML DOSE VACCINE: ICD-10-PCS | Mod: CV19,,, | Performed by: FAMILY MEDICINE

## 2021-08-17 PROCEDURE — 91301 COVID-19, MRNA, LNP-S, PF, 100 MCG/0.5 ML DOSE VACCINE: ICD-10-PCS | Mod: ,,, | Performed by: FAMILY MEDICINE

## 2021-08-17 PROCEDURE — 0011A COVID-19, MRNA, LNP-S, PF, 100 MCG/0.5 ML DOSE VACCINE: CPT | Mod: CV19,,, | Performed by: FAMILY MEDICINE

## 2021-09-03 ENCOUNTER — TELEPHONE (OUTPATIENT)
Dept: PAIN MEDICINE | Facility: CLINIC | Age: 51
End: 2021-09-03

## 2021-09-15 ENCOUNTER — IMMUNIZATION (OUTPATIENT)
Dept: FAMILY MEDICINE | Facility: CLINIC | Age: 51
End: 2021-09-15
Payer: MEDICARE

## 2021-09-15 DIAGNOSIS — Z23 NEED FOR VACCINATION: Primary | ICD-10-CM

## 2021-09-15 PROCEDURE — 0012A COVID-19, MRNA, LNP-S, PF, 100 MCG/0.5 ML DOSE VACCINE: CPT | Mod: CV19,,, | Performed by: FAMILY MEDICINE

## 2021-09-15 PROCEDURE — 91301 COVID-19, MRNA, LNP-S, PF, 100 MCG/0.5 ML DOSE VACCINE: ICD-10-PCS | Mod: ,,, | Performed by: FAMILY MEDICINE

## 2021-09-15 PROCEDURE — 91301 COVID-19, MRNA, LNP-S, PF, 100 MCG/0.5 ML DOSE VACCINE: CPT | Mod: ,,, | Performed by: FAMILY MEDICINE

## 2021-09-15 PROCEDURE — 0012A COVID-19, MRNA, LNP-S, PF, 100 MCG/0.5 ML DOSE VACCINE: ICD-10-PCS | Mod: CV19,,, | Performed by: FAMILY MEDICINE

## 2021-09-28 ENCOUNTER — PES CALL (OUTPATIENT)
Dept: ADMINISTRATIVE | Facility: CLINIC | Age: 51
End: 2021-09-28

## 2021-10-01 PROBLEM — H90.3 SENSORINEURAL HEARING LOSS, BILATERAL: Status: ACTIVE | Noted: 2019-09-18

## 2021-10-12 ENCOUNTER — OFFICE VISIT (OUTPATIENT)
Dept: FAMILY MEDICINE | Facility: CLINIC | Age: 51
End: 2021-10-12
Payer: MEDICARE

## 2021-10-12 VITALS
SYSTOLIC BLOOD PRESSURE: 92 MMHG | DIASTOLIC BLOOD PRESSURE: 70 MMHG | HEART RATE: 81 BPM | TEMPERATURE: 98 F | HEIGHT: 60 IN | BODY MASS INDEX: 22.11 KG/M2 | OXYGEN SATURATION: 95 % | WEIGHT: 112.63 LBS

## 2021-10-12 DIAGNOSIS — J01.00 ACUTE NON-RECURRENT MAXILLARY SINUSITIS: ICD-10-CM

## 2021-10-12 DIAGNOSIS — Z12.31 ENCOUNTER FOR SCREENING MAMMOGRAM FOR BREAST CANCER: ICD-10-CM

## 2021-10-12 DIAGNOSIS — J45.901 ASTHMA WITH ACUTE EXACERBATION, UNSPECIFIED ASTHMA SEVERITY, UNSPECIFIED WHETHER PERSISTENT: ICD-10-CM

## 2021-10-12 DIAGNOSIS — D64.9 ANEMIA, UNSPECIFIED TYPE: ICD-10-CM

## 2021-10-12 DIAGNOSIS — Z12.11 COLON CANCER SCREENING: ICD-10-CM

## 2021-10-12 DIAGNOSIS — Z00.00 WELL WOMAN EXAM WITHOUT GYNECOLOGICAL EXAM: Primary | ICD-10-CM

## 2021-10-12 DIAGNOSIS — J06.9 UPPER RESPIRATORY TRACT INFECTION, UNSPECIFIED TYPE: ICD-10-CM

## 2021-10-12 PROCEDURE — 99214 OFFICE O/P EST MOD 30 MIN: CPT | Mod: S$PBB,,, | Performed by: NURSE PRACTITIONER

## 2021-10-12 PROCEDURE — 99214 PR OFFICE/OUTPT VISIT, EST, LEVL IV, 30-39 MIN: ICD-10-PCS | Mod: S$PBB,,, | Performed by: NURSE PRACTITIONER

## 2021-10-12 PROCEDURE — 99999 PR PBB SHADOW E&M-EST. PATIENT-LVL V: ICD-10-PCS | Mod: PBBFAC,,, | Performed by: NURSE PRACTITIONER

## 2021-10-12 PROCEDURE — 99999 PR PBB SHADOW E&M-EST. PATIENT-LVL V: CPT | Mod: PBBFAC,,, | Performed by: NURSE PRACTITIONER

## 2021-10-12 PROCEDURE — 99215 OFFICE O/P EST HI 40 MIN: CPT | Mod: PBBFAC,PO | Performed by: NURSE PRACTITIONER

## 2021-10-12 RX ORDER — OMEPRAZOLE 40 MG/1
40 CAPSULE, DELAYED RELEASE ORAL DAILY
Qty: 30 CAPSULE | Refills: 0 | Status: SHIPPED | OUTPATIENT
Start: 2021-10-12 | End: 2021-10-15

## 2021-10-12 RX ORDER — DOXYCYCLINE HYCLATE 100 MG
100 TABLET ORAL 2 TIMES DAILY
Qty: 20 TABLET | Refills: 0 | Status: SHIPPED | OUTPATIENT
Start: 2021-10-12 | End: 2021-12-03 | Stop reason: ALTCHOICE

## 2021-10-12 RX ORDER — AZELASTINE 1 MG/ML
1 SPRAY, METERED NASAL 2 TIMES DAILY
Qty: 30 ML | Refills: 1 | Status: SHIPPED | OUTPATIENT
Start: 2021-10-12 | End: 2022-04-08 | Stop reason: SDUPTHER

## 2021-10-12 RX ORDER — FLUTICASONE PROPIONATE 50 MCG
1 SPRAY, SUSPENSION (ML) NASAL 2 TIMES DAILY
Qty: 16 G | Refills: 0 | Status: SHIPPED | OUTPATIENT
Start: 2021-10-12 | End: 2021-11-07

## 2021-10-15 ENCOUNTER — TELEPHONE (OUTPATIENT)
Dept: FAMILY MEDICINE | Facility: CLINIC | Age: 51
End: 2021-10-15

## 2021-10-15 RX ORDER — OMEPRAZOLE 40 MG/1
CAPSULE, DELAYED RELEASE ORAL
Qty: 30 CAPSULE | Refills: 3 | Status: SHIPPED | OUTPATIENT
Start: 2021-10-15 | End: 2021-12-03 | Stop reason: SDUPTHER

## 2021-10-18 ENCOUNTER — TELEPHONE (OUTPATIENT)
Dept: FAMILY MEDICINE | Facility: CLINIC | Age: 51
End: 2021-10-18

## 2021-10-18 RX ORDER — AZITHROMYCIN 250 MG/1
TABLET, FILM COATED ORAL
Qty: 6 TABLET | Refills: 0 | Status: SHIPPED | OUTPATIENT
Start: 2021-10-18 | End: 2021-12-03 | Stop reason: ALTCHOICE

## 2021-10-25 ENCOUNTER — HOSPITAL ENCOUNTER (OUTPATIENT)
Dept: RADIOLOGY | Facility: HOSPITAL | Age: 51
Discharge: HOME | End: 2021-10-25
Attending: NURSE PRACTITIONER
Payer: MEDICARE

## 2021-10-25 DIAGNOSIS — Z12.31 ENCOUNTER FOR SCREENING MAMMOGRAM FOR BREAST CANCER: ICD-10-CM

## 2021-10-25 DIAGNOSIS — Z00.00 WELL WOMAN EXAM WITHOUT GYNECOLOGICAL EXAM: ICD-10-CM

## 2021-10-25 PROCEDURE — 77067 SCR MAMMO BI INCL CAD: CPT | Mod: 26,,, | Performed by: RADIOLOGY

## 2021-10-25 PROCEDURE — 77063 MAMMO DIGITAL SCREENING BILAT WITH TOMO: ICD-10-PCS | Mod: 26,,, | Performed by: RADIOLOGY

## 2021-10-25 PROCEDURE — 77067 MAMMO DIGITAL SCREENING BILAT WITH TOMO: ICD-10-PCS | Mod: 26,,, | Performed by: RADIOLOGY

## 2021-10-25 PROCEDURE — 77063 BREAST TOMOSYNTHESIS BI: CPT | Mod: 26,,, | Performed by: RADIOLOGY

## 2021-10-25 PROCEDURE — 77067 SCR MAMMO BI INCL CAD: CPT | Mod: TC

## 2021-11-15 ENCOUNTER — PATIENT OUTREACH (OUTPATIENT)
Dept: ADMINISTRATIVE | Facility: HOSPITAL | Age: 51
End: 2021-11-15
Payer: MEDICARE

## 2021-12-03 ENCOUNTER — OFFICE VISIT (OUTPATIENT)
Dept: FAMILY MEDICINE | Facility: CLINIC | Age: 51
End: 2021-12-03
Payer: MEDICARE

## 2021-12-03 VITALS
SYSTOLIC BLOOD PRESSURE: 98 MMHG | BODY MASS INDEX: 23.85 KG/M2 | DIASTOLIC BLOOD PRESSURE: 68 MMHG | WEIGHT: 121.5 LBS | OXYGEN SATURATION: 98 % | HEIGHT: 60 IN | HEART RATE: 88 BPM | TEMPERATURE: 98 F

## 2021-12-03 DIAGNOSIS — J32.9 BACTERIAL SINUSITIS: Primary | ICD-10-CM

## 2021-12-03 DIAGNOSIS — B96.89 BACTERIAL SINUSITIS: Primary | ICD-10-CM

## 2021-12-03 DIAGNOSIS — K59.00 CONSTIPATION, UNSPECIFIED CONSTIPATION TYPE: ICD-10-CM

## 2021-12-03 PROCEDURE — 99214 OFFICE O/P EST MOD 30 MIN: CPT | Mod: S$PBB,,, | Performed by: NURSE PRACTITIONER

## 2021-12-03 PROCEDURE — 99999 PR PBB SHADOW E&M-EST. PATIENT-LVL III: ICD-10-PCS | Mod: PBBFAC,,, | Performed by: NURSE PRACTITIONER

## 2021-12-03 PROCEDURE — 99999 PR PBB SHADOW E&M-EST. PATIENT-LVL III: CPT | Mod: PBBFAC,,, | Performed by: NURSE PRACTITIONER

## 2021-12-03 PROCEDURE — 99213 OFFICE O/P EST LOW 20 MIN: CPT | Mod: PBBFAC,PO | Performed by: NURSE PRACTITIONER

## 2021-12-03 PROCEDURE — 99214 PR OFFICE/OUTPT VISIT, EST, LEVL IV, 30-39 MIN: ICD-10-PCS | Mod: S$PBB,,, | Performed by: NURSE PRACTITIONER

## 2021-12-03 RX ORDER — DOXYCYCLINE 100 MG/1
100 CAPSULE ORAL 2 TIMES DAILY
Qty: 20 CAPSULE | Refills: 0 | Status: SHIPPED | OUTPATIENT
Start: 2021-12-03 | End: 2021-12-03

## 2021-12-03 RX ORDER — AMOXICILLIN 500 MG/1
500 CAPSULE ORAL 3 TIMES DAILY
Qty: 30 CAPSULE | Refills: 0 | Status: SHIPPED | OUTPATIENT
Start: 2021-12-03 | End: 2021-12-13

## 2021-12-03 RX ORDER — POLYETHYLENE GLYCOL 3350 17 G/17G
17 POWDER, FOR SOLUTION ORAL 3 TIMES DAILY PRN
Qty: 72 EACH | Refills: 0 | Status: SHIPPED | OUTPATIENT
Start: 2021-12-03 | End: 2022-02-17 | Stop reason: ALTCHOICE

## 2021-12-23 ENCOUNTER — TELEPHONE (OUTPATIENT)
Dept: FAMILY MEDICINE | Facility: CLINIC | Age: 51
End: 2021-12-23
Payer: MEDICARE

## 2021-12-23 RX ORDER — PROMETHAZINE HYDROCHLORIDE AND DEXTROMETHORPHAN HYDROBROMIDE 6.25; 15 MG/5ML; MG/5ML
5 SYRUP ORAL 4 TIMES DAILY PRN
Qty: 120 ML | Refills: 0 | Status: SHIPPED | OUTPATIENT
Start: 2021-12-23 | End: 2021-12-30

## 2021-12-23 RX ORDER — FLUTICASONE PROPIONATE 50 MCG
2 SPRAY, SUSPENSION (ML) NASAL DAILY
Qty: 16 G | Refills: 1 | Status: SHIPPED | OUTPATIENT
Start: 2021-12-23 | End: 2021-12-23

## 2022-02-02 ENCOUNTER — TELEPHONE (OUTPATIENT)
Dept: FAMILY MEDICINE | Facility: CLINIC | Age: 52
End: 2022-02-02
Payer: MEDICARE

## 2022-02-02 NOTE — TELEPHONE ENCOUNTER
Spoke with pt via phone. Pt has had cough for 3+days and now chills starting today. Advised pt that Mr. Puentes was not in office today. Offered same day visit with Dr. Villegas, pt declined. Advised pt to continue with OTC medications; Mucinex, Tylenol, Flonase as well has plenty of fluids. Pt verbalized understanding. Pt has appt scheduled for 2/4/2022, would like to know if cough med can be sent to pharmacy. Please advise.

## 2022-02-02 NOTE — TELEPHONE ENCOUNTER
----- Message from Demetria Ellis sent at 2/2/2022  9:16 AM CST -----  Type: Needs Medical Advice  Who Called:  pt  Symptoms (please be specific):  cough with phlegm; worsens at night and has had no sleep in 2 nights/ Please advise   How long has patient had these symptoms:  3 days  Pharmacy name and phone #:    [id number] [address] [facility] [address] [address]  Phone: [phone] Fax: [phone]      Best Call Back Number: [phone] (home)     Additional Information: has appt Friday

## 2022-02-03 RX ORDER — PROMETHAZINE HYDROCHLORIDE AND DEXTROMETHORPHAN HYDROBROMIDE 6.25; 15 MG/5ML; MG/5ML
5 SYRUP ORAL 4 TIMES DAILY PRN
Qty: 120 ML | Refills: 0 | Status: SHIPPED | OUTPATIENT
Start: 2022-02-03 | End: 2022-02-04 | Stop reason: SDUPTHER

## 2022-02-04 ENCOUNTER — OFFICE VISIT (OUTPATIENT)
Dept: FAMILY MEDICINE | Facility: CLINIC | Age: 52
End: 2022-02-04
Payer: MEDICARE

## 2022-02-04 VITALS
SYSTOLIC BLOOD PRESSURE: 128 MMHG | WEIGHT: 125.88 LBS | HEART RATE: 87 BPM | HEIGHT: 61 IN | OXYGEN SATURATION: 95 % | BODY MASS INDEX: 23.77 KG/M2 | DIASTOLIC BLOOD PRESSURE: 64 MMHG

## 2022-02-04 DIAGNOSIS — R50.9 FEVER: ICD-10-CM

## 2022-02-04 DIAGNOSIS — R50.9 FEVER, UNSPECIFIED FEVER CAUSE: ICD-10-CM

## 2022-02-04 DIAGNOSIS — R05.9 COUGH: Primary | ICD-10-CM

## 2022-02-04 DIAGNOSIS — K42.9 UMBILICAL HERNIA WITHOUT OBSTRUCTION AND WITHOUT GANGRENE: ICD-10-CM

## 2022-02-04 LAB
INFLUENZA A, MOLECULAR: NEGATIVE
INFLUENZA B, MOLECULAR: NEGATIVE
SPECIMEN SOURCE: NORMAL

## 2022-02-04 PROCEDURE — 87502 INFLUENZA DNA AMP PROBE: CPT | Mod: PO | Performed by: NURSE PRACTITIONER

## 2022-02-04 PROCEDURE — 99999 PR PBB SHADOW E&M-EST. PATIENT-LVL V: ICD-10-PCS | Mod: PBBFAC,,, | Performed by: NURSE PRACTITIONER

## 2022-02-04 PROCEDURE — U0003 INFECTIOUS AGENT DETECTION BY NUCLEIC ACID (DNA OR RNA); SEVERE ACUTE RESPIRATORY SYNDROME CORONAVIRUS 2 (SARS-COV-2) (CORONAVIRUS DISEASE [COVID-19]), AMPLIFIED PROBE TECHNIQUE, MAKING USE OF HIGH THROUGHPUT TECHNOLOGIES AS DESCRIBED BY CMS-2020-01-R: HCPCS | Performed by: NURSE PRACTITIONER

## 2022-02-04 PROCEDURE — 99214 PR OFFICE/OUTPT VISIT, EST, LEVL IV, 30-39 MIN: ICD-10-PCS | Mod: S$PBB,,, | Performed by: NURSE PRACTITIONER

## 2022-02-04 PROCEDURE — 99215 OFFICE O/P EST HI 40 MIN: CPT | Mod: PBBFAC,PO | Performed by: NURSE PRACTITIONER

## 2022-02-04 PROCEDURE — 99999 PR PBB SHADOW E&M-EST. PATIENT-LVL V: CPT | Mod: PBBFAC,,, | Performed by: NURSE PRACTITIONER

## 2022-02-04 PROCEDURE — U0005 INFEC AGEN DETEC AMPLI PROBE: HCPCS | Performed by: NURSE PRACTITIONER

## 2022-02-04 PROCEDURE — 99214 OFFICE O/P EST MOD 30 MIN: CPT | Mod: S$PBB,,, | Performed by: NURSE PRACTITIONER

## 2022-02-04 RX ORDER — BENZONATATE 200 MG/1
200 CAPSULE ORAL 3 TIMES DAILY PRN
Qty: 30 CAPSULE | Refills: 0 | Status: SHIPPED | OUTPATIENT
Start: 2022-02-04 | End: 2022-02-14

## 2022-02-04 RX ORDER — PROMETHAZINE HYDROCHLORIDE AND DEXTROMETHORPHAN HYDROBROMIDE 6.25; 15 MG/5ML; MG/5ML
5 SYRUP ORAL 4 TIMES DAILY PRN
Qty: 120 ML | Refills: 0 | Status: SHIPPED | OUTPATIENT
Start: 2022-02-04 | End: 2022-02-11

## 2022-02-04 NOTE — PROGRESS NOTES
This dictation has been generated using Modal Fluency Dictation some phonetic errors may occur. Please contact author for clarification if needed.     Problem List Items Addressed This Visit    None     Visit Diagnoses     Cough    -  Primary    Relevant Orders    Influenza A & B by Molecular (Completed)    COVID-19 Routine Screening    Fever, unspecified fever cause        Relevant Orders    Influenza A & B by Molecular (Completed)    Umbilical hernia without obstruction and without gangrene        Relevant Orders    Ambulatory referral/consult to General Surgery    Fever        Relevant Orders    COVID-19 Routine Screening    Influenza A & B by Molecular (Completed)          Orders Placed This Encounter    Influenza A & B by Molecular    COVID-19 Routine Screening    Ambulatory referral/consult to General Surgery    promethazine-dextromethorphan (PROMETHAZINE-DM) 6.25-15 mg/5 mL Syrp    benzonatate (TESSALON) 200 MG capsule     Cough add tessalon and prometh DM.   Fever rule out covid and flu  Hernia gen surg.     No follow-ups on file.    ________________________________________________________________  ________________________________________________________________      Chief Complaint   Patient presents with    Cough    Dizziness    Sore Throat     History of present illness  This 51 y.o. presents today for complaint of cough and fever.  Onset of symptoms a few days ago.  No loss of taste or smell.  Notes fever at home 101. Does note fatigue.  No chest pain or shortness of breath.  No nausea vomiting diarrhea        Past Medical History:   Diagnosis Date    Abnormal uterine and vaginal bleeding, unspecified 10/29/2018    Allergy     Arthritis     Asthma     Cancer     cervical    CHF (congestive heart failure)     one episode, no follow up    Chronic back pain     Depression     GERD (gastroesophageal reflux disease)     History of psychiatric care     History of psychiatric hospitalization      Menorrhagia with regular cycle 10/11/2018    Migraine     Primary dysmenorrhea 10/11/2018    Psychiatric exam requested by authority     Psychiatric problem     Scoliosis     Therapy        Past Surgical History:   Procedure Laterality Date    APPENDECTOMY      COLPOSCOPY      EPIDURAL STEROID INJECTION INTO LUMBAR SPINE      LAPAROSCOPIC CHOLECYSTECTOMY WITH CHOLANGIOGRAPHY N/A 3/11/2021    Procedure: CHOLECYSTECTOMY, LAPAROSCOPIC, WITH CHOLANGIOGRAM;  Surgeon: Deshawn Mccracken III, MD;  Location: [facility] OR;  Service: General;  Laterality: N/A;    LAPAROSCOPIC SALPINGO-OOPHORECTOMY  10/29/2018    Procedure: SALPINGO-OOPHORECTOMY, LAPAROSCOPIC;  Surgeon: Matthew Rene MD;  Location: [facility] OR;  Service: OB/GYN;;    LAPAROSCOPIC TOTAL HYSTERECTOMY N/A 10/29/2018    Procedure: HYSTERECTOMY, TOTAL, LAPAROSCOPIC;  Surgeon: Matthew Rene MD;  Location: [facility] OR;  Service: OB/GYN;  Laterality: N/A;    MULTIPLE TOOTH EXTRACTIONS      NASAL SEPTOPLASTY Bilateral 9/25/2020    Procedure: SEPTOPLASTY, NASAL;  Surgeon: Surinder Bobby MD;  Location: [facility] OR;  Service: ENT;  Laterality: Bilateral;    NASAL TURBINATE REDUCTION Bilateral 9/25/2020    Procedure: REDUCTION, NASAL TURBINATE;  Surgeon: Surinder Bobby MD;  Location: [facility] OR;  Service: ENT;  Laterality: Bilateral;    RHIZOTOMY W/ RADIOFREQUENCY ABLATION Bilateral        Family History   Adopted: Yes   Problem Relation Age of Onset    Breast cancer Mother     Breast cancer Maternal Grandmother        Social History     Socioeconomic History    Marital status: Legally    Tobacco Use    Smoking status: Current Every Day Smoker     Packs/day: 1.00     Types: Cigarettes    Smokeless tobacco: Never Used    Tobacco comment: quit cold turkey twicce for 5 years with no difficulty.   Substance and Sexual Activity    Alcohol use: No    Drug use: No    Sexual activity: Yes     Partners: Male     Birth control/protection: None   Other  Topics Concern    Patient feels they ought to cut down on drinking/drug use No    Patient annoyed by others criticizing their drinking/drug use Yes    Patient has felt bad or guilty about drinking/drug use No    Patient has had a drink/used drugs as an eye opener in the AM No       Current Outpatient Medications   Medication Sig Dispense Refill    acetaminophen (TYLENOL) 500 MG tablet Take 1,000 mg by mouth every 6 (six) hours as needed for Pain.      azelastine (ASTELIN) 137 mcg (0.1 %) nasal spray 1 spray (137 mcg total) by Nasal route 2 (two) times daily. 30 mL 1    fluticasone propionate (FLONASE) 50 mcg/actuation nasal spray SHAKE LIQUID AND USE 2 SPRAYS(100 MCG) IN EACH NOSTRIL EVERY DAY 48 g 1    gabapentin (NEURONTIN) 800 MG tablet 3 (three) times daily.       guaifenesin/pseudoephedrne HCl (MUCINEX D ORAL) Take by mouth once daily.       ibuprofen (ADVIL,MOTRIN) 800 MG tablet 3 (three) times daily.       levocetirizine (XYZAL) 5 MG tablet TAKE 1 TABLET(5 MG) BY MOUTH EVERY EVENING 30 tablet 11    omeprazole (PRILOSEC) 40 MG capsule TAKE 1 CAPSULE(40 MG) BY MOUTH EVERY DAY 90 capsule 0    ondansetron (ZOFRAN) 4 MG tablet Take 4 mg by mouth 2 (two) times daily as needed.      polyethylene glycol (GLYCOLAX) 17 gram PwPk Take 17 g by mouth 3 (three) times daily as needed (constipation). 72 each 0    QUEtiapine (SEROQUEL) 25 MG Tab TAKE 1 TO 3 TABLETS BY MOUTH AT BEDTIME AS NEEDED      tiZANidine (ZANAFLEX) 4 MG tablet TK 1 TO 3 TS PO HS PRN      benzonatate (TESSALON) 200 MG capsule Take 1 capsule (200 mg total) by mouth 3 (three) times daily as needed for Cough. 30 capsule 0    HYDROcodone-acetaminophen (NORCO)  mg per tablet Take 1 tablet by mouth 4 (four) times daily as needed.      promethazine-dextromethorphan (PROMETHAZINE-DM) 6.25-15 mg/5 mL Syrp Take 5 mLs by mouth 4 (four) times daily as needed (cough). 120 mL 0     No current facility-administered medications for this visit.      Facility-Administered Medications Ordered in Other Visits   Medication Dose Route Frequency Provider Last Rate Last Admin    lactated ringers infusion   Intravenous Continuous Patricio Ceron MD 10 mL/hr at 09/25/20 0936 New Bag at 09/25/20 1031    lidocaine (PF) 10 mg/ml (1%) injection 10 mg  1 mL Intradermal Once Patricio Ceron MD           Review of patient's allergies indicates:   Allergen Reactions    Codeine Nausea And Vomiting     If taken with tylenol    Skelaxin [metaxalone] Other (See Comments)     Burning in throat    Zoloft [sertraline] Rash       Physical examination  Vitals Reviewed\  Vitals:    02/04/22 1611   BP: 128/64   Pulse: 87     Body mass index is 23.79 kg/m². .FLOWAMB[14    Weight: 57.1 kg (125 lb 14.1 oz)    Gen. Well-dressed well-nourished   Skin warm dry and intact.  No rashes noted.  HEENT.  TM intact bilateral with normal light reflex.  No mastoid tenderness during percussion.  Nares patent bilateral.  Pharynx is unremarkable.  No maxillary or frontal sinus tenderness when percussed.    Neck is supple without adenopathy  Chest.  Respirations are even unlabored.  Lungs are clear to auscultation.  Cardiac regular rate and rhythm.  No chest wall adenopathy noted.  Neuro. Awake alert oriented x4.  Normal judgment and cognition noted.  Extremities no clubbing cyanosis or edema noted.     Call or return to clinic prn if these symptoms worsen or fail to improve as anticipated.

## 2022-02-05 LAB
SARS-COV-2 RNA RESP QL NAA+PROBE: NOT DETECTED
SARS-COV-2- CYCLE NUMBER: NORMAL

## 2022-02-07 ENCOUNTER — TELEPHONE (OUTPATIENT)
Dept: FAMILY MEDICINE | Facility: CLINIC | Age: 52
End: 2022-02-07
Payer: MEDICARE

## 2022-02-07 NOTE — TELEPHONE ENCOUNTER
Advised patient a referral was put in for her to see general surgery. Patient advised she will schedule appointment.

## 2022-02-07 NOTE — TELEPHONE ENCOUNTER
----- Message from Leah Choi sent at 2/7/2022  3:34 PM CST -----  Contact: pt  Type: Needs Medical Advice    Who: Called: pt     Best Call Back Number: [phone]    Inquiry/Question: pt needs to know what is the next step for her to do for her hernias please call pt to advise  Thank you~

## 2022-02-10 ENCOUNTER — OFFICE VISIT (OUTPATIENT)
Dept: SURGERY | Facility: CLINIC | Age: 52
End: 2022-02-10
Payer: MEDICARE

## 2022-02-10 VITALS — HEART RATE: 82 BPM | SYSTOLIC BLOOD PRESSURE: 118 MMHG | DIASTOLIC BLOOD PRESSURE: 75 MMHG | TEMPERATURE: 97 F

## 2022-02-10 DIAGNOSIS — K42.9 UMBILICAL HERNIA WITHOUT OBSTRUCTION AND WITHOUT GANGRENE: ICD-10-CM

## 2022-02-10 PROCEDURE — 99213 PR OFFICE/OUTPT VISIT, EST, LEVL III, 20-29 MIN: ICD-10-PCS | Mod: S$GLB,,, | Performed by: PHYSICIAN ASSISTANT

## 2022-02-10 PROCEDURE — 99213 OFFICE O/P EST LOW 20 MIN: CPT | Mod: S$GLB,,, | Performed by: PHYSICIAN ASSISTANT

## 2022-02-10 RX ORDER — QUETIAPINE FUMARATE 50 MG/1
50 TABLET, FILM COATED ORAL NIGHTLY PRN
COMMUNITY
Start: 2021-10-25

## 2022-02-10 RX ORDER — DOXYCYCLINE 100 MG/1
100 CAPSULE ORAL 2 TIMES DAILY
COMMUNITY
Start: 2021-12-03 | End: 2022-02-17 | Stop reason: ALTCHOICE

## 2022-02-10 RX ORDER — OXYCODONE AND ACETAMINOPHEN 10; 325 MG/1; MG/1
1 TABLET ORAL 4 TIMES DAILY PRN
COMMUNITY
Start: 2022-01-23

## 2022-02-10 NOTE — PROGRESS NOTES
History & Physical    SUBJECTIVE:     History of Present Illness:  Patient is a 51 y.o. female presents with an umbilical hernia. Onset of symptoms was gradual starting several months ago with gradually worsening course since that time. Patient complains of pain above and below her umbilicus, especially in certain positions or with straining. She is s/p lap mora in 3/2021. She feels that her umbilical incision didn't heal as expected.     Chief Complaint   Patient presents with    Hernia     umbilical       Review of patient's allergies indicates:   Allergen Reactions    Codeine Nausea And Vomiting     If taken with tylenol    Skelaxin [metaxalone] Other (See Comments)     Burning in throat    Zoloft [sertraline] Rash       Current Outpatient Medications   Medication Sig Dispense Refill    acetaminophen (TYLENOL) 500 MG tablet Take 1,000 mg by mouth every 6 (six) hours as needed for Pain.      azelastine (ASTELIN) 137 mcg (0.1 %) nasal spray 1 spray (137 mcg total) by Nasal route 2 (two) times daily. 30 mL 1    benzonatate (TESSALON) 200 MG capsule Take 1 capsule (200 mg total) by mouth 3 (three) times daily as needed for Cough. 30 capsule 0    doxycycline (MONODOX) 100 MG capsule Take 100 mg by mouth 2 (two) times daily.      fluticasone propionate (FLONASE) 50 mcg/actuation nasal spray SHAKE LIQUID AND USE 2 SPRAYS(100 MCG) IN EACH NOSTRIL EVERY DAY 48 g 1    gabapentin (NEURONTIN) 800 MG tablet 3 (three) times daily.       guaifenesin/pseudoephedrne HCl (MUCINEX D ORAL) Take by mouth once daily.       ibuprofen (ADVIL,MOTRIN) 800 MG tablet 3 (three) times daily.       levocetirizine (XYZAL) 5 MG tablet TAKE 1 TABLET(5 MG) BY MOUTH EVERY EVENING 30 tablet 11    omeprazole (PRILOSEC) 40 MG capsule TAKE 1 CAPSULE(40 MG) BY MOUTH EVERY DAY 30 capsule 1    ondansetron (ZOFRAN) 4 MG tablet Take 4 mg by mouth 2 (two) times daily as needed.      oxyCODONE-acetaminophen (PERCOCET)  mg per tablet  Take 1 tablet by mouth 4 (four) times daily as needed.      polyethylene glycol (GLYCOLAX) 17 gram PwPk Take 17 g by mouth 3 (three) times daily as needed (constipation). 72 each 0    promethazine-dextromethorphan (PROMETHAZINE-DM) 6.25-15 mg/5 mL Syrp Take 5 mLs by mouth 4 (four) times daily as needed (cough). 120 mL 0    QUEtiapine (SEROQUEL) 25 MG Tab TAKE 1 TO 3 TABLETS BY MOUTH AT BEDTIME AS NEEDED      QUEtiapine (SEROQUEL) 50 MG tablet Take 50 mg by mouth nightly as needed.      tiZANidine (ZANAFLEX) 4 MG tablet TK 1 TO 3 TS PO HS PRN      HYDROcodone-acetaminophen (NORCO)  mg per tablet Take 1 tablet by mouth 4 (four) times daily as needed.       No current facility-administered medications for this visit.     Facility-Administered Medications Ordered in Other Visits   Medication Dose Route Frequency Provider Last Rate Last Admin    lactated ringers infusion   Intravenous Continuous Patricio Ceron MD 10 mL/hr at 09/25/20 0936 New Bag at 09/25/20 1031    lidocaine (PF) 10 mg/ml (1%) injection 10 mg  1 mL Intradermal Once Patricio Ceron MD           Past Medical History:   Diagnosis Date    Abnormal uterine and vaginal bleeding, unspecified 10/29/2018    Allergy     Arthritis     Asthma     Cancer     cervical    CHF (congestive heart failure)     one episode, no follow up    Chronic back pain     Depression     GERD (gastroesophageal reflux disease)     History of psychiatric care     History of psychiatric hospitalization     Menorrhagia with regular cycle 10/11/2018    Migraine     Primary dysmenorrhea 10/11/2018    Psychiatric exam requested by authority     Psychiatric problem     Scoliosis     Therapy      Past Surgical History:   Procedure Laterality Date    APPENDECTOMY      COLPOSCOPY      EPIDURAL STEROID INJECTION INTO LUMBAR SPINE      LAPAROSCOPIC CHOLECYSTECTOMY WITH CHOLANGIOGRAPHY N/A 3/11/2021    Procedure: CHOLECYSTECTOMY, LAPAROSCOPIC, WITH  CHOLANGIOGRAM;  Surgeon: Deshawn Mccracken III, MD;  Location: [facility] OR;  Service: General;  Laterality: N/A;    LAPAROSCOPIC SALPINGO-OOPHORECTOMY  10/29/2018    Procedure: SALPINGO-OOPHORECTOMY, LAPAROSCOPIC;  Surgeon: Matthew Rene MD;  Location: [facility] OR;  Service: OB/GYN;;    LAPAROSCOPIC TOTAL HYSTERECTOMY N/A 10/29/2018    Procedure: HYSTERECTOMY, TOTAL, LAPAROSCOPIC;  Surgeon: Matthew Rene MD;  Location: [facility] OR;  Service: OB/GYN;  Laterality: N/A;    MULTIPLE TOOTH EXTRACTIONS      NASAL SEPTOPLASTY Bilateral 9/25/2020    Procedure: SEPTOPLASTY, NASAL;  Surgeon: Surinder Bobby MD;  Location: [facility] OR;  Service: ENT;  Laterality: Bilateral;    NASAL TURBINATE REDUCTION Bilateral 9/25/2020    Procedure: REDUCTION, NASAL TURBINATE;  Surgeon: Surinder Bobby MD;  Location: [facility] OR;  Service: ENT;  Laterality: Bilateral;    RHIZOTOMY W/ RADIOFREQUENCY ABLATION Bilateral      Family History   Adopted: Yes   Problem Relation Age of Onset    Breast cancer Mother     Breast cancer Maternal Grandmother      Social History     Tobacco Use    Smoking status: Current Every Day Smoker     Packs/day: 1.00     Types: Cigarettes    Smokeless tobacco: Never Used    Tobacco comment: quit cold turkey twicce for 5 years with no difficulty.   Substance Use Topics    Alcohol use: No    Drug use: No        Review of Systems:  Review of Systems   Constitutional: Negative for activity change, chills and fever.   Respiratory: Negative for shortness of breath.    Cardiovascular: Negative for chest pain.   Gastrointestinal: Positive for abdominal pain. Negative for nausea and vomiting.   Genitourinary: Negative for dysuria.   Musculoskeletal: Negative for myalgias.   Skin: Negative for wound.   Neurological: Negative for weakness.   Hematological: Does not bruise/bleed easily.   Psychiatric/Behavioral: The patient is not nervous/anxious.        OBJECTIVE:     Vital Signs (Most Recent)  Temp: 97 °F (36.1 °C)  (02/10/22 1028)  Pulse: 82 (02/10/22 1028)  BP: 118/75 (02/10/22 1028)           Physical Exam:  Physical Exam  Vitals reviewed.   Constitutional:       Appearance: Normal appearance. She is well-developed and well-nourished.   HENT:      Head: Normocephalic and atraumatic.   Eyes:      Extraocular Movements: EOM normal.   Cardiovascular:      Rate and Rhythm: Normal rate.   Pulmonary:      Effort: Pulmonary effort is normal. No respiratory distress.   Abdominal:      Palpations: Abdomen is soft.      Tenderness: There is abdominal tenderness (near umbilicus).      Hernia: A hernia (very small umbilical hernia that was partially reduced in clinic. she is tender at umbilicus) is present.   Musculoskeletal:         General: Normal range of motion.   Skin:     General: Skin is warm and dry.   Neurological:      Mental Status: She is alert.   Psychiatric:         Mood and Affect: Mood and affect normal.         Thought Content: Thought content normal.           ASSESSMENT/PLAN:     Small umbilical hernia - discussed open umbilical hernia repair vs continued watchful waiting. She would like to proceed with repair since she is symptomatic. The procedure was discussed in detail, including risks and alternatives. The patient voices understanding and all questions were answered. The patient agrees to proceed as planned.

## 2022-02-10 NOTE — H&P (VIEW-ONLY)
History & Physical    SUBJECTIVE:     History of Present Illness:  Patient is a 51 y.o. female presents with an umbilical hernia. Onset of symptoms was gradual starting several months ago with gradually worsening course since that time. Patient complains of pain above and below her umbilicus, especially in certain positions or with straining. She is s/p lap mora in 3/2021. She feels that her umbilical incision didn't heal as expected.     Chief Complaint   Patient presents with    Hernia     umbilical       Review of patient's allergies indicates:   Allergen Reactions    Codeine Nausea And Vomiting     If taken with tylenol    Skelaxin [metaxalone] Other (See Comments)     Burning in throat    Zoloft [sertraline] Rash       Current Outpatient Medications   Medication Sig Dispense Refill    acetaminophen (TYLENOL) 500 MG tablet Take 1,000 mg by mouth every 6 (six) hours as needed for Pain.      azelastine (ASTELIN) 137 mcg (0.1 %) nasal spray 1 spray (137 mcg total) by Nasal route 2 (two) times daily. 30 mL 1    benzonatate (TESSALON) 200 MG capsule Take 1 capsule (200 mg total) by mouth 3 (three) times daily as needed for Cough. 30 capsule 0    doxycycline (MONODOX) 100 MG capsule Take 100 mg by mouth 2 (two) times daily.      fluticasone propionate (FLONASE) 50 mcg/actuation nasal spray SHAKE LIQUID AND USE 2 SPRAYS(100 MCG) IN EACH NOSTRIL EVERY DAY 48 g 1    gabapentin (NEURONTIN) 800 MG tablet 3 (three) times daily.       guaifenesin/pseudoephedrne HCl (MUCINEX D ORAL) Take by mouth once daily.       ibuprofen (ADVIL,MOTRIN) 800 MG tablet 3 (three) times daily.       levocetirizine (XYZAL) 5 MG tablet TAKE 1 TABLET(5 MG) BY MOUTH EVERY EVENING 30 tablet 11    omeprazole (PRILOSEC) 40 MG capsule TAKE 1 CAPSULE(40 MG) BY MOUTH EVERY DAY 30 capsule 1    ondansetron (ZOFRAN) 4 MG tablet Take 4 mg by mouth 2 (two) times daily as needed.      oxyCODONE-acetaminophen (PERCOCET)  mg per tablet  Take 1 tablet by mouth 4 (four) times daily as needed.      polyethylene glycol (GLYCOLAX) 17 gram PwPk Take 17 g by mouth 3 (three) times daily as needed (constipation). 72 each 0    promethazine-dextromethorphan (PROMETHAZINE-DM) 6.25-15 mg/5 mL Syrp Take 5 mLs by mouth 4 (four) times daily as needed (cough). 120 mL 0    QUEtiapine (SEROQUEL) 25 MG Tab TAKE 1 TO 3 TABLETS BY MOUTH AT BEDTIME AS NEEDED      QUEtiapine (SEROQUEL) 50 MG tablet Take 50 mg by mouth nightly as needed.      tiZANidine (ZANAFLEX) 4 MG tablet TK 1 TO 3 TS PO HS PRN      HYDROcodone-acetaminophen (NORCO)  mg per tablet Take 1 tablet by mouth 4 (four) times daily as needed.       No current facility-administered medications for this visit.     Facility-Administered Medications Ordered in Other Visits   Medication Dose Route Frequency Provider Last Rate Last Admin    lactated ringers infusion   Intravenous Continuous Patricio Ceron MD 10 mL/hr at 09/25/20 0936 New Bag at 09/25/20 1031    lidocaine (PF) 10 mg/ml (1%) injection 10 mg  1 mL Intradermal Once Patricio Ceron MD           Past Medical History:   Diagnosis Date    Abnormal uterine and vaginal bleeding, unspecified 10/29/2018    Allergy     Arthritis     Asthma     Cancer     cervical    CHF (congestive heart failure)     one episode, no follow up    Chronic back pain     Depression     GERD (gastroesophageal reflux disease)     History of psychiatric care     History of psychiatric hospitalization     Menorrhagia with regular cycle 10/11/2018    Migraine     Primary dysmenorrhea 10/11/2018    Psychiatric exam requested by authority     Psychiatric problem     Scoliosis     Therapy      Past Surgical History:   Procedure Laterality Date    APPENDECTOMY      COLPOSCOPY      EPIDURAL STEROID INJECTION INTO LUMBAR SPINE      LAPAROSCOPIC CHOLECYSTECTOMY WITH CHOLANGIOGRAPHY N/A 3/11/2021    Procedure: CHOLECYSTECTOMY, LAPAROSCOPIC, WITH  CHOLANGIOGRAM;  Surgeon: Deshawn Mccracken III, MD;  Location: [facility] OR;  Service: General;  Laterality: N/A;    LAPAROSCOPIC SALPINGO-OOPHORECTOMY  10/29/2018    Procedure: SALPINGO-OOPHORECTOMY, LAPAROSCOPIC;  Surgeon: Matthew Rene MD;  Location: [facility] OR;  Service: OB/GYN;;    LAPAROSCOPIC TOTAL HYSTERECTOMY N/A 10/29/2018    Procedure: HYSTERECTOMY, TOTAL, LAPAROSCOPIC;  Surgeon: Matthew Rene MD;  Location: [facility] OR;  Service: OB/GYN;  Laterality: N/A;    MULTIPLE TOOTH EXTRACTIONS      NASAL SEPTOPLASTY Bilateral 9/25/2020    Procedure: SEPTOPLASTY, NASAL;  Surgeon: Surinder Bobby MD;  Location: [facility] OR;  Service: ENT;  Laterality: Bilateral;    NASAL TURBINATE REDUCTION Bilateral 9/25/2020    Procedure: REDUCTION, NASAL TURBINATE;  Surgeon: Surinder Bobby MD;  Location: [facility] OR;  Service: ENT;  Laterality: Bilateral;    RHIZOTOMY W/ RADIOFREQUENCY ABLATION Bilateral      Family History   Adopted: Yes   Problem Relation Age of Onset    Breast cancer Mother     Breast cancer Maternal Grandmother      Social History     Tobacco Use    Smoking status: Current Every Day Smoker     Packs/day: 1.00     Types: Cigarettes    Smokeless tobacco: Never Used    Tobacco comment: quit cold turkey twicce for 5 years with no difficulty.   Substance Use Topics    Alcohol use: No    Drug use: No        Review of Systems:  Review of Systems   Constitutional: Negative for activity change, chills and fever.   Respiratory: Negative for shortness of breath.    Cardiovascular: Negative for chest pain.   Gastrointestinal: Positive for abdominal pain. Negative for nausea and vomiting.   Genitourinary: Negative for dysuria.   Musculoskeletal: Negative for myalgias.   Skin: Negative for wound.   Neurological: Negative for weakness.   Hematological: Does not bruise/bleed easily.   Psychiatric/Behavioral: The patient is not nervous/anxious.        OBJECTIVE:     Vital Signs (Most Recent)  Temp: 97 °F (36.1 °C)  (02/10/22 1028)  Pulse: 82 (02/10/22 1028)  BP: 118/75 (02/10/22 1028)           Physical Exam:  Physical Exam  Vitals reviewed.   Constitutional:       Appearance: Normal appearance. She is well-developed and well-nourished.   HENT:      Head: Normocephalic and atraumatic.   Eyes:      Extraocular Movements: EOM normal.   Cardiovascular:      Rate and Rhythm: Normal rate.   Pulmonary:      Effort: Pulmonary effort is normal. No respiratory distress.   Abdominal:      Palpations: Abdomen is soft.      Tenderness: There is abdominal tenderness (near umbilicus).      Hernia: A hernia (very small umbilical hernia that was partially reduced in clinic. she is tender at umbilicus) is present.   Musculoskeletal:         General: Normal range of motion.   Skin:     General: Skin is warm and dry.   Neurological:      Mental Status: She is alert.   Psychiatric:         Mood and Affect: Mood and affect normal.         Thought Content: Thought content normal.           ASSESSMENT/PLAN:     Small umbilical hernia - discussed open umbilical hernia repair vs continued watchful waiting. She would like to proceed with repair since she is symptomatic. The procedure was discussed in detail, including risks and alternatives. The patient voices understanding and all questions were answered. The patient agrees to proceed as planned.

## 2022-02-17 ENCOUNTER — HOSPITAL ENCOUNTER (OUTPATIENT)
Dept: RADIOLOGY | Facility: HOSPITAL | Age: 52
Discharge: HOME | End: 2022-02-17
Attending: SURGERY
Payer: MEDICARE

## 2022-02-17 ENCOUNTER — HOSPITAL ENCOUNTER (OUTPATIENT)
Dept: PREADMISSION TESTING | Facility: HOSPITAL | Age: 52
Discharge: HOME | End: 2022-02-17
Attending: SURGERY
Payer: MEDICARE

## 2022-02-17 VITALS
DIASTOLIC BLOOD PRESSURE: 72 MMHG | BODY MASS INDEX: 22.19 KG/M2 | RESPIRATION RATE: 18 BRPM | OXYGEN SATURATION: 98 % | SYSTOLIC BLOOD PRESSURE: 107 MMHG | TEMPERATURE: 98 F | HEART RATE: 88 BPM | HEIGHT: 60 IN | WEIGHT: 113 LBS

## 2022-02-17 DIAGNOSIS — Z01.818 PRE-OP TESTING: ICD-10-CM

## 2022-02-17 DIAGNOSIS — Z01.818 PRE-OP TESTING: Primary | ICD-10-CM

## 2022-02-17 PROCEDURE — 93010 EKG 12-LEAD: ICD-10-PCS | Mod: ,,, | Performed by: SPECIALIST

## 2022-02-17 PROCEDURE — 71046 X-RAY EXAM CHEST 2 VIEWS: CPT | Mod: TC

## 2022-02-17 PROCEDURE — 93010 ELECTROCARDIOGRAM REPORT: CPT | Mod: ,,, | Performed by: SPECIALIST

## 2022-02-17 PROCEDURE — 93005 ELECTROCARDIOGRAM TRACING: CPT | Performed by: SPECIALIST

## 2022-02-17 NOTE — DISCHARGE INSTRUCTIONS
To confirm, Your doctor has instructed you that surgery is scheduled for:   Monday, February 21, 2022    Pre-Op will call the afternoon prior to surgery between 4:00 and 6:00 PM with the final arrival time.   Friday, February 18, 2022 Friday, February 18, 2022 at 10:15 am for Covid Screen.    [facility] [address] entrance. Check in at Registration desk.    Do not eat or drink anything after midnight the night before your surgery - THIS INCLUDES  WATER, GUM, MINTS AND CANDY.  YOU MAY BRUSH YOUR TEETH BUT DO NOT SWALLOW     TAKE ONLY THESE MEDICATIONS WITH A SMALL SIP OF WATER THE MORNING OF YOUR PROCEDURE:  GABAPENTIN/OMEPRAZOLE    PLEASE NOTE:  The surgery schedule has many variables which may affect the time of your surgery case.  Family members should be available if your surgery time changes.  Plan to be here the day of your procedure between 4-6 hours.      DO NOT TAKE THESE MEDICATIONS 5-7 DAYS PRIOR to your procedure or per your surgeon's request: ASPIRIN, ALEVE, ADVIL, IBUPROFEN,  YASH SELTZER, BC , FISH OIL , VITAMIN E, HERBALS  (May take Tylenol)                                                          IMPORTANT INSTRUCTIONS    · Do not smoke, vape or drink alcoholic beverages 24 hours prior to your procedure.  · Shower the night before AND the morning of your procedure with a Chlorhexidine wash such as Hibiclens or Dial antibacterial soap from the neck down.   ·  Do not get it on your face or in your eyes.  You may use your own shampoo and face wash. This helps your skin to be as bacteria free as possible.   ·  DO NOT remove hair from the surgery site.  Do not shave the incision site unless you are given specific instructions to do so.    · Sleep in a bed with clean sheets.  Do not sleep with a pet in the bed.   · If you wear contact lenses, dentures, hearing aids or glasses, bring a container to put them in during surgery and give to a family member for safe keeping.     · Please leave all jewelry, piercing's and valuables at home.     · Make arrangements in advance for transportation home by a responsible adult.    · You must make arrangements for transportation, TAXI'S, UBER'S OR LYFTS ARE NOT ALLOWED.      If you have any questions about these instructions, call Pre-Op Admit  Nursing at [phone] or the Pre-Op Day Surgery Unit at [phone].

## 2022-02-18 ENCOUNTER — HOSPITAL ENCOUNTER (OUTPATIENT)
Dept: PREADMISSION TESTING | Facility: HOSPITAL | Age: 52
Discharge: HOME | End: 2022-02-18
Attending: SURGERY
Payer: MEDICARE

## 2022-02-18 DIAGNOSIS — Z01.818 PRE-OP TESTING: Primary | ICD-10-CM

## 2022-02-18 LAB — SARS-COV-2 RDRP RESP QL NAA+PROBE: NEGATIVE

## 2022-02-18 PROCEDURE — U0002 COVID-19 LAB TEST NON-CDC: HCPCS | Performed by: SURGERY

## 2022-02-21 ENCOUNTER — HOSPITAL ENCOUNTER (OUTPATIENT)
Facility: HOSPITAL | Age: 52
Discharge: HOME | End: 2022-02-21
Attending: SURGERY | Admitting: SURGERY
Payer: MEDICARE

## 2022-02-21 ENCOUNTER — ANESTHESIA (OUTPATIENT)
Dept: SURGERY | Facility: HOSPITAL | Age: 52
End: 2022-02-21
Payer: MEDICARE

## 2022-02-21 ENCOUNTER — ANESTHESIA EVENT (OUTPATIENT)
Dept: SURGERY | Facility: HOSPITAL | Age: 52
End: 2022-02-21
Payer: MEDICARE

## 2022-02-21 VITALS
HEART RATE: 71 BPM | SYSTOLIC BLOOD PRESSURE: 108 MMHG | WEIGHT: 113 LBS | OXYGEN SATURATION: 98 % | RESPIRATION RATE: 16 BRPM | TEMPERATURE: 98 F | BODY MASS INDEX: 22.19 KG/M2 | DIASTOLIC BLOOD PRESSURE: 64 MMHG | HEIGHT: 60 IN

## 2022-02-21 DIAGNOSIS — K42.0 UMBILICAL HERNIA WITH OBSTRUCTION, WITHOUT GANGRENE: Primary | ICD-10-CM

## 2022-02-21 DIAGNOSIS — Z01.818 PRE-OP TESTING: ICD-10-CM

## 2022-02-21 LAB — POTASSIUM SERPL-SCNC: 3.6 MMOL/L (ref 3.5–5.1)

## 2022-02-21 PROCEDURE — 25000003 PHARM REV CODE 250: Performed by: SURGERY

## 2022-02-21 PROCEDURE — 49585 PR REPAIR UMBILICAL HERN,5+Y/O,REDUC: ICD-10-PCS | Mod: ,,, | Performed by: SURGERY

## 2022-02-21 PROCEDURE — 36000707: Performed by: SURGERY

## 2022-02-21 PROCEDURE — C9290 INJ, BUPIVACAINE LIPOSOME: HCPCS | Performed by: SURGERY

## 2022-02-21 PROCEDURE — 25000003 PHARM REV CODE 250: Performed by: STUDENT IN AN ORGANIZED HEALTH CARE EDUCATION/TRAINING PROGRAM

## 2022-02-21 PROCEDURE — 25000003 PHARM REV CODE 250: Performed by: ANESTHESIOLOGY

## 2022-02-21 PROCEDURE — 37000009 HC ANESTHESIA EA ADD 15 MINS: Performed by: SURGERY

## 2022-02-21 PROCEDURE — 84132 ASSAY OF SERUM POTASSIUM: CPT | Performed by: SURGERY

## 2022-02-21 PROCEDURE — 71000015 HC POSTOP RECOV 1ST HR: Performed by: SURGERY

## 2022-02-21 PROCEDURE — 36000706: Performed by: SURGERY

## 2022-02-21 PROCEDURE — 63600175 PHARM REV CODE 636 W HCPCS: Performed by: SURGERY

## 2022-02-21 PROCEDURE — 71000033 HC RECOVERY, INTIAL HOUR: Performed by: SURGERY

## 2022-02-21 PROCEDURE — 37000008 HC ANESTHESIA 1ST 15 MINUTES: Performed by: SURGERY

## 2022-02-21 PROCEDURE — 49585 PR REPAIR UMBILICAL HERN,5+Y/O,REDUC: CPT | Mod: ,,, | Performed by: SURGERY

## 2022-02-21 PROCEDURE — 63600175 PHARM REV CODE 636 W HCPCS: Performed by: STUDENT IN AN ORGANIZED HEALTH CARE EDUCATION/TRAINING PROGRAM

## 2022-02-21 PROCEDURE — 27201423 OPTIME MED/SURG SUP & DEVICES STERILE SUPPLY: Performed by: SURGERY

## 2022-02-21 RX ORDER — ACETAMINOPHEN 10 MG/ML
INJECTION, SOLUTION INTRAVENOUS
Status: DISCONTINUED | OUTPATIENT
Start: 2022-02-21 | End: 2022-02-21

## 2022-02-21 RX ORDER — ROCURONIUM BROMIDE 10 MG/ML
INJECTION, SOLUTION INTRAVENOUS
Status: DISCONTINUED | OUTPATIENT
Start: 2022-02-21 | End: 2022-02-21

## 2022-02-21 RX ORDER — SODIUM CHLORIDE, SODIUM LACTATE, POTASSIUM CHLORIDE, CALCIUM CHLORIDE 600; 310; 30; 20 MG/100ML; MG/100ML; MG/100ML; MG/100ML
INJECTION, SOLUTION INTRAVENOUS CONTINUOUS PRN
Status: DISCONTINUED | OUTPATIENT
Start: 2022-02-21 | End: 2022-02-21

## 2022-02-21 RX ORDER — OXYCODONE HYDROCHLORIDE 5 MG/1
5 TABLET ORAL
Status: DISCONTINUED | OUTPATIENT
Start: 2022-02-21 | End: 2022-02-21 | Stop reason: HOSPADM

## 2022-02-21 RX ORDER — ONDANSETRON 2 MG/ML
4 INJECTION INTRAMUSCULAR; INTRAVENOUS DAILY PRN
Status: DISCONTINUED | OUTPATIENT
Start: 2022-02-21 | End: 2022-02-21 | Stop reason: HOSPADM

## 2022-02-21 RX ORDER — LIDOCAINE HYDROCHLORIDE 20 MG/ML
JELLY TOPICAL
Status: DISCONTINUED | OUTPATIENT
Start: 2022-02-21 | End: 2022-02-21

## 2022-02-21 RX ORDER — BUPIVACAINE HYDROCHLORIDE AND EPINEPHRINE 2.5; 5 MG/ML; UG/ML
INJECTION, SOLUTION EPIDURAL; INFILTRATION; INTRACAUDAL; PERINEURAL
Status: DISCONTINUED | OUTPATIENT
Start: 2022-02-21 | End: 2022-02-21 | Stop reason: HOSPADM

## 2022-02-21 RX ORDER — MEPERIDINE HYDROCHLORIDE 50 MG/ML
12.5 INJECTION INTRAMUSCULAR; INTRAVENOUS; SUBCUTANEOUS EVERY 10 MIN PRN
Status: DISCONTINUED | OUTPATIENT
Start: 2022-02-21 | End: 2022-02-21 | Stop reason: HOSPADM

## 2022-02-21 RX ORDER — HYDROCODONE BITARTRATE AND ACETAMINOPHEN 5; 325 MG/1; MG/1
1 TABLET ORAL EVERY 6 HOURS PRN
Qty: 25 TABLET | Refills: 0 | Status: SHIPPED | OUTPATIENT
Start: 2022-02-21 | End: 2022-04-08 | Stop reason: ALTCHOICE

## 2022-02-21 RX ORDER — ONDANSETRON 2 MG/ML
INJECTION INTRAMUSCULAR; INTRAVENOUS
Status: DISCONTINUED | OUTPATIENT
Start: 2022-02-21 | End: 2022-02-21

## 2022-02-21 RX ORDER — FAMOTIDINE 10 MG/ML
INJECTION INTRAVENOUS
Status: DISCONTINUED | OUTPATIENT
Start: 2022-02-21 | End: 2022-02-21

## 2022-02-21 RX ORDER — DEXAMETHASONE SODIUM PHOSPHATE 4 MG/ML
INJECTION, SOLUTION INTRA-ARTICULAR; INTRALESIONAL; INTRAMUSCULAR; INTRAVENOUS; SOFT TISSUE
Status: DISCONTINUED | OUTPATIENT
Start: 2022-02-21 | End: 2022-02-21

## 2022-02-21 RX ORDER — SUCCINYLCHOLINE CHLORIDE 20 MG/ML
INJECTION INTRAMUSCULAR; INTRAVENOUS
Status: DISCONTINUED | OUTPATIENT
Start: 2022-02-21 | End: 2022-02-21

## 2022-02-21 RX ORDER — FENTANYL CITRATE 50 UG/ML
INJECTION, SOLUTION INTRAMUSCULAR; INTRAVENOUS
Status: DISCONTINUED | OUTPATIENT
Start: 2022-02-21 | End: 2022-02-21

## 2022-02-21 RX ORDER — LIDOCAINE HYDROCHLORIDE 20 MG/ML
INJECTION, SOLUTION EPIDURAL; INFILTRATION; INTRACAUDAL; PERINEURAL
Status: DISCONTINUED | OUTPATIENT
Start: 2022-02-21 | End: 2022-02-21

## 2022-02-21 RX ORDER — HYDROMORPHONE HYDROCHLORIDE 1 MG/ML
0.2 INJECTION, SOLUTION INTRAMUSCULAR; INTRAVENOUS; SUBCUTANEOUS EVERY 5 MIN PRN
Status: DISCONTINUED | OUTPATIENT
Start: 2022-02-21 | End: 2022-02-21 | Stop reason: HOSPADM

## 2022-02-21 RX ORDER — DIPHENHYDRAMINE HYDROCHLORIDE 50 MG/ML
12.5 INJECTION INTRAMUSCULAR; INTRAVENOUS
Status: DISCONTINUED | OUTPATIENT
Start: 2022-02-21 | End: 2022-02-21 | Stop reason: HOSPADM

## 2022-02-21 RX ORDER — SODIUM CHLORIDE 0.9 % (FLUSH) 0.9 %
10 SYRINGE (ML) INJECTION
Status: DISCONTINUED | OUTPATIENT
Start: 2022-02-21 | End: 2022-02-21 | Stop reason: HOSPADM

## 2022-02-21 RX ORDER — MIDAZOLAM HYDROCHLORIDE 1 MG/ML
INJECTION INTRAMUSCULAR; INTRAVENOUS
Status: DISCONTINUED | OUTPATIENT
Start: 2022-02-21 | End: 2022-02-21

## 2022-02-21 RX ORDER — CEFAZOLIN SODIUM 1 G/3ML
INJECTION, POWDER, FOR SOLUTION INTRAMUSCULAR; INTRAVENOUS
Status: DISCONTINUED | OUTPATIENT
Start: 2022-02-21 | End: 2022-02-21

## 2022-02-21 RX ORDER — PROPOFOL 10 MG/ML
VIAL (ML) INTRAVENOUS
Status: DISCONTINUED | OUTPATIENT
Start: 2022-02-21 | End: 2022-02-21

## 2022-02-21 RX ADMIN — CEFAZOLIN 2 G: 330 INJECTION, POWDER, FOR SOLUTION INTRAMUSCULAR; INTRAVENOUS at 10:02

## 2022-02-21 RX ADMIN — SODIUM CHLORIDE, SODIUM LACTATE, POTASSIUM CHLORIDE, AND CALCIUM CHLORIDE: .6; .31; .03; .02 INJECTION, SOLUTION INTRAVENOUS at 11:02

## 2022-02-21 RX ADMIN — ONDANSETRON 4 MG: 2 INJECTION INTRAMUSCULAR; INTRAVENOUS at 10:02

## 2022-02-21 RX ADMIN — MIDAZOLAM HYDROCHLORIDE 2 MG: 1 INJECTION, SOLUTION INTRAMUSCULAR; INTRAVENOUS at 10:02

## 2022-02-21 RX ADMIN — PROPOFOL 100 MG: 10 INJECTION, EMULSION INTRAVENOUS at 10:02

## 2022-02-21 RX ADMIN — ROCURONIUM BROMIDE 5 MG: 10 INJECTION, SOLUTION INTRAVENOUS at 10:02

## 2022-02-21 RX ADMIN — SODIUM CHLORIDE, SODIUM LACTATE, POTASSIUM CHLORIDE, AND CALCIUM CHLORIDE: .6; .31; .03; .02 INJECTION, SOLUTION INTRAVENOUS at 10:02

## 2022-02-21 RX ADMIN — FENTANYL CITRATE 100 MCG: 50 INJECTION INTRAMUSCULAR; INTRAVENOUS at 10:02

## 2022-02-21 RX ADMIN — SUGAMMADEX 200 MG: 100 INJECTION, SOLUTION INTRAVENOUS at 11:02

## 2022-02-21 RX ADMIN — OXYCODONE HYDROCHLORIDE 5 MG: 5 TABLET ORAL at 11:02

## 2022-02-21 RX ADMIN — ACETAMINOPHEN 1000 MG: 10 INJECTION, SOLUTION INTRAVENOUS at 10:02

## 2022-02-21 RX ADMIN — LIDOCAINE HYDROCHLORIDE 60 MG: 20 INJECTION, SOLUTION INTRAVENOUS at 10:02

## 2022-02-21 RX ADMIN — SUCCINYLCHOLINE CHLORIDE 160 MG: 20 INJECTION, SOLUTION INTRAMUSCULAR; INTRAVENOUS at 10:02

## 2022-02-21 RX ADMIN — ROCURONIUM BROMIDE 20 MG: 10 INJECTION, SOLUTION INTRAVENOUS at 10:02

## 2022-02-21 RX ADMIN — DEXAMETHASONE SODIUM PHOSPHATE 8 MG: 4 INJECTION, SOLUTION INTRAMUSCULAR; INTRAVENOUS at 10:02

## 2022-02-21 RX ADMIN — FAMOTIDINE 20 MG: 10 INJECTION, SOLUTION INTRAVENOUS at 10:02

## 2022-02-21 RX ADMIN — LIDOCAINE HYDROCHLORIDE 4 ML: 20 JELLY TOPICAL at 10:02

## 2022-02-21 NOTE — ANESTHESIA PROCEDURE NOTES
Intubation    Date/Time: 2/21/2022 10:41 AM  Performed by: Luana Alvarez CRNA  Authorized by: Jori Whittington MD     Intubation:     Induction:  Intravenous    Intubated:  Postinduction    Mask Ventilation:  Easy mask    Attempts:  1    Attempted By:  CRNA    Method of Intubation:  Video laryngoscopy    Blade:  [place] 3    Laryngeal View Grade: Grade I - full view of cords      Difficult Airway Encountered?: No      Complications:  None    Airway Device:  Oral endotracheal tube    Airway Device Size:  7.5    Style/Cuff Inflation:  Cuffed    Inflation Amount (mL):  5    Tube secured:  21    Secured at:  The lips    Placement Verified By:  Capnometry    Complicating Factors:  None    Findings Post-Intubation:  BS equal bilateral and atraumatic/condition of teeth unchanged

## 2022-02-21 NOTE — ANESTHESIA POSTPROCEDURE EVALUATION
Anesthesia Post Evaluation    Patient: Alisson Eldridge    Procedure(s) Performed: Procedure(s) (LRB):  REPAIR, HERNIA, UMBILICAL, AGE 5 YEARS OR OLDER (N/A)    Final Anesthesia Type: general      Patient location during evaluation: PACU  Patient participation: Yes- Able to Participate  Level of consciousness: awake and alert  Post-procedure vital signs: reviewed and stable  Pain management: adequate  Airway patency: patent  GLORIA mitigation strategies: Multimodal analgesia, Extubation while patient is awake and Extubation and recovery carried out in lateral, semiupright, or other nonsupine position  PONV status at discharge: No PONV  Anesthetic complications: no      Cardiovascular status: blood pressure returned to baseline  Respiratory status: unassisted  Hydration status: euvolemic  Follow-up not needed.          Vitals Value Taken Time   /57 02/21/22 1145   Temp 36.3 °C (97.4 °F) 02/21/22 1134   Pulse 72 02/21/22 1159   Resp 20 02/21/22 1159   SpO2 99 % 02/21/22 1159   Vitals shown include unvalidated device data.      No case tracking events are documented in the log.      Pain/Hannah Score: Pain Rating Prior to Med Admin: 3 (2/21/2022 11:49 AM)  Hannah Score: 10 (2/21/2022 11:45 AM)

## 2022-02-21 NOTE — ANESTHESIA PREPROCEDURE EVALUATION
02/21/2022  Alisson Eldridge is a 51 y.o., female.    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    Tobacco abuse    Chronic bilateral low back pain without sciatica    Nasal obstruction    Thrombocytosis    Hypophosphatemia    Hypoalbuminemia due to protein-calorie malnutrition       Past Surgical History:   Procedure Laterality Date    APPENDECTOMY  1991    COLPOSCOPY      EPIDURAL STEROID INJECTION INTO LUMBAR SPINE      HYSTERECTOMY  2018    LAPAROSCOPIC CHOLECYSTECTOMY WITH CHOLANGIOGRAPHY N/A 3/11/2021    Procedure: CHOLECYSTECTOMY, LAPAROSCOPIC, WITH CHOLANGIOGRAM;  Surgeon: Deshawn Mccracken III, MD;  Location: [facility] OR;  Service: General;  Laterality: N/A;    LAPAROSCOPIC SALPINGO-OOPHORECTOMY  10/29/2018    Procedure: SALPINGO-OOPHORECTOMY, LAPAROSCOPIC;  Surgeon: Matthew Rene MD;  Location: [facility] OR;  Service: OB/GYN;;    LAPAROSCOPIC TOTAL HYSTERECTOMY N/A 10/29/2018    Procedure: HYSTERECTOMY, TOTAL, LAPAROSCOPIC;  Surgeon: Matthew Rene MD;  Location: [facility] OR;  Service: OB/GYN;  Laterality: N/A;    MULTIPLE TOOTH EXTRACTIONS      NASAL SEPTOPLASTY Bilateral 9/25/2020    Procedure: SEPTOPLASTY, NASAL;  Surgeon: Surinder Bobby MD;  Location: [facility] OR;  Service: ENT;  Laterality: Bilateral;    NASAL TURBINATE REDUCTION Bilateral 9/25/2020    Procedure: REDUCTION, NASAL TURBINATE;  Surgeon: Surinder Bobby MD;  Location: [facility] OR;  Service: ENT;  Laterality: Bilateral;    RHIZOTOMY W/ RADIOFREQUENCY ABLATION Bilateral         Tobacco Use:  The patient  reports that she has been smoking cigarettes. She has a 40.00 pack-year smoking history. She has never used smokeless tobacco.      Results for orders placed or performed during the hospital encounter of 02/17/22   EKG 12-lead    Collection Time: 02/17/22 11:06 AM    Narrative    Test Reason : Z01.818,    Vent. Rate : 067 BPM     Atrial Rate : 067 BPM     P-R Int : 148 ms          QRS Dur : 064 ms      QT Int : 390 ms       P-R-T Axes : 056 051 048 degrees     QTc Int : 412 ms    Normal sinus rhythm  Normal ECG  When compared with ECG of 07-AUG-2019 10:56,  Minimal criteria for Inferior infarct are no longer Present  Confirmed by Theron Klein MD ([id number]) on 2/20/2022 6:27:38 PM    Referred By:  ALMA           Confirmed By:Theron Klein MD             Lab Results   Component Value Date    WBC 9.69 02/17/2022    HGB 12.8 02/17/2022    HCT 40.4 02/17/2022    MCV 99 (H) 02/17/2022     02/17/2022     BMP  Lab Results   Component Value Date     02/17/2022    K 3.6 02/21/2022     02/17/2022    CO2 22 (L) 02/17/2022    BUN 24 (H) 02/17/2022    CREATININE 0.7 02/17/2022    CALCIUM 9.6 02/17/2022    ANIONGAP 11 02/17/2022     02/17/2022     (H) 03/12/2021    GLU 62 (L) 03/11/2021       Results for orders placed during the hospital encounter of 08/20/20    Echo Color Flow Doppler? Yes    Interpretation Summary  · Normal left ventricular systolic function. The estimated ejection fraction is 63%.  · Normal LV diastolic function.  · The left ventricular global longitudinal strain is -21%. normal  · Normal right ventricular systolic function.  · Mild mitral regurgitation.  · Normal central venous pressure (3 mmHg).  · The estimated PA systolic pressure is 20 mmHg.  · No wall motion abnormalities.            Pre-op Assessment    I have reviewed the Patient Summary Reports.     I have reviewed the Nursing Notes.    I have reviewed the Medications.     Review of Systems  Anesthesia Hx:  No problems with previous Anesthesia  Denies Family Hx of Anesthesia complications.   Denies Personal Hx of Anesthesia complications.    Social:  Smoker    Hematology/Oncology:  Hematology Normal        EENT/Dental:EENT/Dental Normal   Cardiovascular:   CHF (Occurred in 2010.  Reports good function current)    Pulmonary:   Asthma (Occasional use of inhalers.) mild    Renal/:  Renal/ Normal     Hepatic/GI:   GERD, well controlled    Musculoskeletal:  Musculoskeletal Normal    Neurological:   Headaches (Once or twice per month, they can last up to 4 days.)    Endocrine:  Endocrine Normal    Psych:   Psychiatric History (History of psychiatric care) depression          Physical Exam  General: Well nourished    Dental:  Dentures  Patient does not want to remove the dentures.  She understands that that can lead to the dentures been damaged during the intubation, but she is okay with that risk.  Chest/Lungs:  Clear to auscultation, Normal Respiratory Rate    Heart:  Rate: Normal  Rhythm: Regular Rhythm        Anesthesia Plan  Type of Anesthesia, risks & benefits discussed:    Anesthesia Type: Gen ETT  Intra-op Monitoring Plan: Standard ASA Monitors  Post Op Pain Control Plan: IV/PO Opioids PRN and multimodal analgesia  Induction:  IV  Airway Plan: Video  Informed Consent: Informed consent signed with the Patient and all parties understand the risks and agree with anesthesia plan.  All questions answered.   ASA Score: 3  Day of Surgery Review of History & Physical: H&P update referred to the surgeon.   Anesthesia Plan Notes: GETA.  Multimodal analgesia with ofirmev 1000mg, decadron 8 mg, Ketamine 25 mg.  PONV prophylaxis with Pepcid 20 mg IV, and Zofran 4 mg IV.      Ready For Surgery From Anesthesia Perspective.     .

## 2022-02-21 NOTE — DISCHARGE INSTRUCTIONS
PATIENT INSTRUCTIONS  HERNIA    FOLLOW-UP:  Please make an appointment with your physician in one week.  Call your physician immediately if you have any fevers greater than 102.5, drainage from you wound that is not clear or looks infected, persistent bleeding, increasing abdominal pain, problems urinating, or persistent nausea/vomiting.      WOUND CARE INSTRUCTIONS:  Keep a dry clean dressing on the wound if there is drainage. The initial bandage may be removed after 24 hours.  Once the wound has quit draining you may leave it open to air.  If clothing rubs against the wound or causes irritation and the wound is not draining you may cover it with a dry dressing during the daytime.  Try to keep the wound dry and avoid ointments on the wound unless directed to do so.  If the wound becomes bright red and painful or starts to drain infected material that is not clear, please contact your physician immediately.  If the wound is mildly pink and has a thick firm ridge underneath it, this is normal, and is referred to as a healing ridge.  This will resolve over the next 4-6 weeks.    DIET:  You may eat any foods that you can tolerate.  It is a good idea to eat a high fiber diet and take in plenty of fluids to prevent constipation.  If you do become constipated you may want to take a mild laxative or take ducolax tablets on a daily basis until your bowel habits are regular.  Constipation can be very uncomfortable, along with straining, after recent abdominal surgery.    ACTIVITY:  You are encouraged to cough and deep breath or use your incentive spirometer if you were given one, every 15-30 minutes when awake.  This will help prevent respiratory complications and low grade fevers post-operatively.  You may want to hug a pillow when coughing and sneezing to add additional support to the surgical area which will decrease pain during these times.  You are encouraged to walk and engage in light activity for the next two weeks.   You should not lift more than 20 pounds during this time frame as it could put you at increased risk for a hernia recurrence.  Twenty pounds is roughly equivalent to a plastic bag of groceries.      MEDICATIONS:  Try to take narcotic medications and anti-inflammatory medications, such as tylenol, ibuprofen, naprosyn, etc., with food.  This will minimize stomach upset from the medication.  Should you develop nausea and vomiting from the pain medication, or develop a rash, please discontinue the medication and contact your physician.  You should not drive, make important decisions, or operate machinery when taking narcotic pain medication.    QUESTIONS:  Please feel free to call your physician or the hospital  if you have any questions, and they will be glad to assist you.

## 2022-02-21 NOTE — PLAN OF CARE
1240- pt ambulated to the restroom with a steady gait. Pt voided with no difficulty. Pt is alert and oriented breathing even unlabored. Surgical site is clean and dry with no redness or swelling. Pt wheeled to her vehicle with no incident. pts significant other transporting home.

## 2022-02-21 NOTE — TRANSFER OF CARE
Anesthesia Transfer of Care Note    Patient: Alisson Eldridge    Procedure(s) Performed: Procedure(s) (LRB):  REPAIR, HERNIA, UMBILICAL (N/A)    Patient location: PACU    Transport from OR: Transported from OR on room air with adequate spontaneous ventilation    Post pain: adequate analgesia    Post assessment: no apparent anesthetic complications    Post vital signs: stable    Level of consciousness: awake    Nausea/Vomiting: no nausea/vomiting    Complications: none    Transfer of care protocol was followed      Last vitals:   Visit Vitals  /63   Pulse 76   Temp 36.7 °C (98.1 °F) (Oral)   Resp 17   Ht 5' (1.524 m)   Wt 51.3 kg (113 lb)   LMP 10/08/2018   SpO2 99%   Breastfeeding No   BMI 22.07 kg/m²

## 2022-02-21 NOTE — PLAN OF CARE
1210- pt is alert and oriented breathing even unlabored with no complaints of pain at this time. pts surgical site is clean and dry with no redness or swelling noted.

## 2022-02-22 NOTE — BRIEF OP NOTE
[facility]  Brief Operative Note    SUMMARY     Surgery Date: 2/21/2022     Surgeon(s) and Role:     * Deshawn Mccracken III, MD - Primary    Assisting Surgeon: None    Pre-op Diagnosis:  Umbilical hernia with obstruction, without gangrene [K42.0]    Post-op Diagnosis:  Post-Op Diagnosis Codes:     * Umbilical hernia with obstruction, without gangrene [K42.0]    Procedure(s) (LRB):  Primary REPAIR, HERNIA, Umbilical    Anesthesia: General    Operative Findings: The patient was taken to the operating room and transferred to the operating room table in the supine position.  The patient was given general anesthesia and intubated.  Abdomen was sterilely prepped and draped.  An infra-umbilical transverse  incision was made.  Dissection was carried down through the skin and subcutaneous tissue.  The umbilical stalk was dissected out 360 degrees.  The stalk was amputated near the skin level.  This exposed the umbilical hernia.  It was an 8 mm - 10 mm defect.  There was only fat in the hernia sac.  THere was no bowel compromise.  The fascial defect was approximated with interrupted Ethibond suture.  The appearance of a belly button was recreated by suturing the umbilical skin to the underlying fascia.  The skin was then closed with 4-0 Monocryl. She was extubated and brought to the recovery room in stable condition.  Complications none   Instrument counts correct     Estimated Blood Loss:  5 mL    Estimated Blood Loss has been documented.         Specimens:   Specimen (24h ago, onward)            None          [id number]

## 2022-02-22 NOTE — DISCHARGE SUMMARY
Discharge Summary  General Surgery      Admit Date: 2/21/2022    Discharge Date :2/21/2022    Attending Physician: Deshawn Mccracken III     Discharge Physician: Deshawn Mccracken III    Discharged Condition: good    Discharge Diagnosis: Umbilical hernia with obstruction, without gangrene [K42.0]    Treatments/Procedures: Procedure(s) (LRB):  REPAIR, HERNIA, UMBILICAL, AGE 5 YEARS OR OLDER (N/A)    Hospital Course: Uneventful; Discharged home from Recovery    Significant Diagnostic Studies: none    Disposition: Home or Self Care    Diet: Regular    Follow up: Office 10-14 days    Activity: No heavy lifting till seen in office.    Patient Instructions:   Discharge Medication List as of 2/21/2022 12:23 PM      START taking these medications    Details   HYDROcodone-acetaminophen (NORCO) 5-325 mg per tablet Take 1 tablet by mouth every 6 (six) hours as needed for Pain., Starting Mon 2/21/2022, Normal         CONTINUE these medications which have NOT CHANGED    Details   acetaminophen (TYLENOL) 500 MG tablet Take 1,000 mg by mouth every 6 (six) hours as needed for Pain., Historical Med      azelastine (ASTELIN) 137 mcg (0.1 %) nasal spray 1 spray (137 mcg total) by Nasal route 2 (two) times daily., Starting Tue 10/12/2021, Until Wed 10/12/2022, Normal      fluticasone propionate (FLONASE) 50 mcg/actuation nasal spray SHAKE LIQUID AND USE 2 SPRAYS(100 MCG) IN EACH NOSTRIL EVERY DAY, Normal      gabapentin (NEURONTIN) 800 MG tablet Take 800 mg by mouth 3 (three) times daily., Starting Fri 7/31/2020, Historical Med      guaifenesin/pseudoephedrne HCl (MUCINEX D ORAL) Take 1 capsule by mouth once daily., Historical Med      ibuprofen (ADVIL,MOTRIN) 800 MG tablet Take 800 mg by mouth 3 (three) times daily., Starting Fri 7/31/2020, Historical Med      levocetirizine (XYZAL) 5 MG tablet TAKE 1 TABLET(5 MG) BY MOUTH EVERY EVENING, Normal      ondansetron (ZOFRAN) 4 MG tablet Take 4 mg by mouth 2 (two) times daily as needed.,  Starting Mon 3/15/2021, Historical Med      oxyCODONE-acetaminophen (PERCOCET)  mg per tablet Take 1 tablet by mouth 4 (four) times daily as needed., Starting Sun 1/23/2022, Historical Med      !! QUEtiapine (SEROQUEL) 25 MG Tab Take 25 mg by mouth nightly as needed., Starting Fri 1/22/2021, Historical Med      !! QUEtiapine (SEROQUEL) 50 MG tablet Take 50 mg by mouth nightly as needed., Starting Mon 10/25/2021, Historical Med      tiZANidine (ZANAFLEX) 4 MG tablet Take 4 mg by mouth daily as needed., Starting Tue 9/22/2020, Historical Med      multivitamin capsule Take 1 capsule by mouth once daily., Historical Med      omeprazole (PRILOSEC) 40 MG capsule TAKE 1 CAPSULE(40 MG) BY MOUTH EVERY DAY, Normal       !! - Potential duplicate medications found. Please discuss with provider.          Discharge Procedure Orders   Diet Adult Regular     Lifting restrictions   Order Comments: No lifting over twenty pounds for six weeks     Remove dressing in 48 hours

## 2022-02-22 NOTE — OP NOTE
Surgery Date: 2/21/2022     Surgeon(s) and Role:     * Deshawn Mccracken III, MD - Primary    Assisting Surgeon: None    Pre-op Diagnosis:  Umbilical hernia with obstruction, without gangrene [K42.0]    Post-op Diagnosis:  Post-Op Diagnosis Codes:     * Umbilical hernia with obstruction, without gangrene [K42.0]    Procedure(s) (LRB):  Primary REPAIR, HERNIA, Umbilical    Anesthesia: General    Operative Findings: The patient was taken to the operating room and transferred to the operating room table in the supine position.  The patient was given general anesthesia and intubated.  Abdomen was sterilely prepped and draped.  An infra-umbilical transverse  incision was made.  Dissection was carried down through the skin and subcutaneous tissue.  The umbilical stalk was dissected out 360 degrees.  The stalk was amputated near the skin level.  This exposed the umbilical hernia.  It was an 8 mm - 10 mm defect.  There was only fat in the hernia sac.  THere was no bowel compromise.  The fascial defect was approximated with interrupted Ethibond suture.  The appearance of a belly button was recreated by suturing the umbilical skin to the underlying fascia.  The skin was then closed with 4-0 Monocryl. She was extubated and brought to the recovery room in stable condition.  Complications none   Instrument counts correct     Estimated Blood Loss:  5 mL    Estimated Blood Loss has been documented.         Specimens:   Specimen (24h ago, onward)            None          [id number]

## 2022-02-24 ENCOUNTER — TELEPHONE (OUTPATIENT)
Dept: SURGERY | Facility: CLINIC | Age: 52
End: 2022-02-24
Payer: MEDICARE

## 2022-02-24 ENCOUNTER — TELEPHONE (OUTPATIENT)
Dept: FAMILY MEDICINE | Facility: CLINIC | Age: 52
End: 2022-02-24
Payer: MEDICARE

## 2022-02-24 NOTE — TELEPHONE ENCOUNTER
----- Message from Giovanna Cortés sent at 2/24/2022  3:00 PM CST -----  Contact: patient  Type: Needs Medical Advice  Who Called:  patient   Symptoms (please be specific):  cough with a lot of mucus  How long has patient had these symptoms:  since having hernia surgery monday  Pharmacy name and phone #:    [facility] #[id number] - [address] & [address] [address]  Phone: [phone] Fax: [phone]  Best Call Back Number: [phone]   Additional Information:

## 2022-02-24 NOTE — TELEPHONE ENCOUNTER
PLEASE MAKE APPOINTMENT FOR ASSESSMENT.  POSTSURGICAL we are concerned about development of pneumonia.  Please ask she also notify surgeon of current health status.

## 2022-02-24 NOTE — TELEPHONE ENCOUNTER
----- Message from Demetria Ellis sent at 2/24/2022  8:26 AM CST -----  Type: Needs Medical Advice  Who Called: pt  Pharmacy name and phone #:    [id number] - [place] [address]  AT [facility]  [address] [facility] [address]  Phone: [phone] Fax: [phone]      Best Call Back Number: [phone] (home)     Additional Information:Pt says since her procedure she is coughing and has a lot of mucus. She is needing something as it causing pain to her hernia to cough.

## 2022-02-24 NOTE — TELEPHONE ENCOUNTER
Pt requesting refill on cough syrup and tessalon pearls. Pt states she had hernia surgery earlier this week and has developed cough with green mucus. Please advise.

## 2022-03-03 ENCOUNTER — OFFICE VISIT (OUTPATIENT)
Dept: SURGERY | Facility: CLINIC | Age: 52
End: 2022-03-03
Payer: MEDICARE

## 2022-03-03 VITALS — HEART RATE: 82 BPM | TEMPERATURE: 97 F | DIASTOLIC BLOOD PRESSURE: 69 MMHG | SYSTOLIC BLOOD PRESSURE: 101 MMHG

## 2022-03-03 DIAGNOSIS — T78.40XA ALLERGY, INITIAL ENCOUNTER: ICD-10-CM

## 2022-03-03 DIAGNOSIS — K42.9 UMBILICAL HERNIA WITHOUT OBSTRUCTION AND WITHOUT GANGRENE: Primary | ICD-10-CM

## 2022-03-03 PROCEDURE — 99024 POSTOP FOLLOW-UP VISIT: CPT | Mod: S$GLB,POP,, | Performed by: SURGERY

## 2022-03-03 PROCEDURE — 99024 PR POST-OP FOLLOW-UP VISIT: ICD-10-PCS | Mod: S$GLB,POP,, | Performed by: SURGERY

## 2022-03-03 NOTE — PROGRESS NOTES
Subjective:       Patient ID: Alisson Eldridge is a 51 y.o. female.    Chief Complaint: Post-op Evaluation      HPI:  S/p primary repair of umbilical hernia. Tolerating diet. Bruising improved and almost resolved. Pain nearly resolved.     Review of Systems    Objective:      Physical Exam  Constitutional:       General: She is not in acute distress.  Pulmonary:      Effort: Pulmonary effort is normal. No respiratory distress.   Abdominal:      General: There is no distension.      Palpations: Abdomen is soft.      Tenderness: There is no abdominal tenderness. There is no guarding or rebound.      Hernia: No hernia is present.   Skin:     Comments: Incision is clean, dry and intact  There is no evidence of infection, hematoma or seroma    Neurological:      Mental Status: She is alert and oriented to person, place, and time.   Psychiatric:         Behavior: Behavior is cooperative.         Assessment/Plan:   Alisson was seen today for post-op evaluation.    Diagnoses and all orders for this visit:    Umbilical hernia without obstruction and without gangrene    Allergy, initial encounter  -     Ambulatory referral/consult to Allergy; Future        S/p primary repair. Doing well. RTC PRN. She also would like to reestablish with an allergist. Will place referral.

## 2022-04-08 ENCOUNTER — TELEPHONE (OUTPATIENT)
Dept: FAMILY MEDICINE | Facility: CLINIC | Age: 52
End: 2022-04-08
Payer: MEDICARE

## 2022-04-08 ENCOUNTER — OFFICE VISIT (OUTPATIENT)
Dept: FAMILY MEDICINE | Facility: CLINIC | Age: 52
End: 2022-04-08
Payer: MEDICARE

## 2022-04-08 VITALS
OXYGEN SATURATION: 98 % | TEMPERATURE: 98 F | HEART RATE: 70 BPM | WEIGHT: 122.13 LBS | HEIGHT: 60 IN | SYSTOLIC BLOOD PRESSURE: 112 MMHG | DIASTOLIC BLOOD PRESSURE: 58 MMHG | BODY MASS INDEX: 23.98 KG/M2

## 2022-04-08 DIAGNOSIS — H61.23 BILATERAL IMPACTED CERUMEN: ICD-10-CM

## 2022-04-08 DIAGNOSIS — H61.893 IRRITATION OF EXTERNAL AUDITORY CANAL, BILATERAL: ICD-10-CM

## 2022-04-08 DIAGNOSIS — J06.9 UPPER RESPIRATORY TRACT INFECTION, UNSPECIFIED TYPE: Primary | ICD-10-CM

## 2022-04-08 DIAGNOSIS — R68.83 CHILLS: ICD-10-CM

## 2022-04-08 DIAGNOSIS — Z72.0 TOBACCO ABUSE: ICD-10-CM

## 2022-04-08 DIAGNOSIS — R11.10 VOMITING, INTRACTABILITY OF VOMITING NOT SPECIFIED, PRESENCE OF NAUSEA NOT SPECIFIED, UNSPECIFIED VOMITING TYPE: ICD-10-CM
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
## 2022-04-08 LAB
INFLUENZA A, MOLECULAR: NEGATIVE
INFLUENZA B, MOLECULAR: NEGATIVE
SPECIMEN SOURCE: NORMAL

## 2022-04-08 PROCEDURE — 99999 PR PBB SHADOW E&M-EST. PATIENT-LVL III: CPT | Mod: PBBFAC,,, | Performed by: NURSE PRACTITIONER

## 2022-04-08 PROCEDURE — 99214 PR OFFICE/OUTPT VISIT, EST, LEVL IV, 30-39 MIN: ICD-10-PCS | Mod: S$PBB,,, | Performed by: NURSE PRACTITIONER

## 2022-04-08 PROCEDURE — 99214 OFFICE O/P EST MOD 30 MIN: CPT | Mod: S$PBB,,, | Performed by: NURSE PRACTITIONER

## 2022-04-08 PROCEDURE — 99999 PR PBB SHADOW E&M-EST. PATIENT-LVL III: ICD-10-PCS | Mod: PBBFAC,,, | Performed by: NURSE PRACTITIONER

## 2022-04-08 PROCEDURE — 87502 INFLUENZA DNA AMP PROBE: CPT | Mod: PO | Performed by: NURSE PRACTITIONER

## 2022-04-08 PROCEDURE — 99213 OFFICE O/P EST LOW 20 MIN: CPT | Mod: PBBFAC,PO | Performed by: NURSE PRACTITIONER

## 2022-04-08 RX ORDER — FLUTICASONE PROPIONATE 50 MCG
2 SPRAY, SUSPENSION (ML) NASAL DAILY
Qty: 9.9 ML | Refills: 0 | Status: SHIPPED | OUTPATIENT
Start: 2022-04-08 | End: 2022-04-11

## 2022-04-08 RX ORDER — LEVOCETIRIZINE DIHYDROCHLORIDE 5 MG/1
5 TABLET, FILM COATED ORAL NIGHTLY
Qty: 30 TABLET | Refills: 3 | Status: SHIPPED | OUTPATIENT
Start: 2022-04-08 | End: 2022-07-13

## 2022-04-08 RX ORDER — AZELASTINE 1 MG/ML
1 SPRAY, METERED NASAL 2 TIMES DAILY
Qty: 30 ML | Refills: 1 | Status: SHIPPED | OUTPATIENT
Start: 2022-04-08 | End: 2022-04-11

## 2022-04-08 RX ORDER — PROMETHAZINE HYDROCHLORIDE AND DEXTROMETHORPHAN HYDROBROMIDE 6.25; 15 MG/5ML; MG/5ML
5 SYRUP ORAL EVERY 4 HOURS PRN
Qty: 118 ML | Refills: 0 | Status: SHIPPED | OUTPATIENT
Start: 2022-04-08 | End: 2022-04-18

## 2022-04-08 RX ORDER — MECLIZINE HYDROCHLORIDE 25 MG/1
25 TABLET ORAL 3 TIMES DAILY PRN
Qty: 30 TABLET | Refills: 0 | Status: SHIPPED | OUTPATIENT
Start: 2022-04-08 | End: 2023-03-16 | Stop reason: ALTCHOICE

## 2022-04-08 RX ORDER — HYDROCORTISONE 25 MG/ML
LOTION TOPICAL 2 TIMES DAILY
Qty: 118 ML | Refills: 1 | Status: SHIPPED | OUTPATIENT
Start: 2022-04-08 | End: 2023-03-16 | Stop reason: ALTCHOICE

## 2022-04-08 RX ORDER — NEOMYCIN SULFATE, POLYMYXIN B SULFATE AND HYDROCORTISONE 10; 3.5; 1 MG/ML; MG/ML; [USP'U]/ML
3 SUSPENSION/ DROPS AURICULAR (OTIC) 4 TIMES DAILY
Qty: 10 ML | Refills: 0 | Status: SHIPPED | OUTPATIENT
Start: 2022-04-08 | End: 2022-04-13

## 2022-04-08 NOTE — TELEPHONE ENCOUNTER
Spoke with pt via phone. She states that symptoms; sinus, cough, sore throat, pressure behind the eyes, vomiting, ear ache, headache, and dizziness, all started 2 days ago. Pt unsure if she has fever but states that she does get chills. Scheduled same day visit as requested. All understanding.

## 2022-04-08 NOTE — TELEPHONE ENCOUNTER
----- Message from Roxane Ho sent at 4/8/2022  8:16 AM CDT -----  Regarding: pt called  Name of Who is Calling: CORINNE LOPEZ [[id number]]      What is the request in detail: pt has sinus, cough, sore throat, pressure behind the eyes, vomiting, ear ache, headache, and dizziness, and would like to be seen today. Please advise       Can the clinic reply by [facility]: NO      What Number to Call Back [facility] not in [facility]: [phone]

## 2022-04-08 NOTE — PROGRESS NOTES
This dictation has been generated using Modal Fluency Dictation some phonetic errors may occur. Please contact author for clarification if needed.     Problem List Items Addressed This Visit        Other    Tobacco abuse      Other Visit Diagnoses     Upper respiratory tract infection, unspecified type    -  Primary    Relevant Medications    azelastine (ASTELIN) 137 mcg (0.1 %) nasal spray    Vomiting, intractability of vomiting not specified, presence of nausea not specified, unspecified vomiting type        Relevant Orders    Influenza A & B by Molecular (Completed)    Chills        Relevant Orders    Influenza A & B by Molecular (Completed)    Insect bite, unspecified site, initial encounter        Bilateral impacted cerumen        Irritation of external auditory canal, bilateral              Orders Placed This Encounter    Influenza A & B by Molecular    levocetirizine (XYZAL) 5 MG tablet    azelastine (ASTELIN) 137 mcg (0.1 %) nasal spray    fluticasone propionate (FLONASE) 50 mcg/actuation nasal spray    hydrocortisone 2.5 % lotion    promethazine-dextromethorphan (PROMETHAZINE-DM) 6.25-15 mg/5 mL Syrp    neomycin-polymyxin-hydrocortisone (CORTISPORIN) 3.5-10,000-1 mg/mL-unit/mL-% otic suspension    meclizine (ANTIVERT) 25 mg tablet     Rapid flu negative. Viral URI. Dizziness attributed to eustachian tube dysfunction. Instruction provided for nasal sailine TID. Flonase BID x 3 days, then daily. Astelin BID. Continue Xyzal. Phenergan DM PRN cough. Meclizine PRN dizziness. Dizziness should improve with sinus drainage. Cortisporin QID, will help with remaining left cerumen impaction and external canal excoriation. Hydrocortisone lotion for insect bites. She was encouraged to refrain from smoking. Declined smoking cessation interventions at this time. She would follow up if symptoms do not improve over the weekend.     No follow-ups on  file.    ________________________________________________________________  ________________________________________________________________      Chief Complaint   Patient presents with    Cough    Emesis    Dizziness     History of present illness  This 51 y.o. presents today for allergies with facial pressure x 4 days. + sore throat. + painful swallowing. + bilateral ear pain. + cough with green sputum. + chills (-fever). Reports vomiting twice after eating fried catfish ( with vomit also). She became dizzy last night w/o syncope. Denies abd pain or changes in bowel habits. Takes Xyzal and Mucenix w/o relief. No ill contact.     Also with generalized mosquito bites from being outside at a friends house. +itching.         Past Medical History:   Diagnosis Date    Abnormal uterine and vaginal bleeding, unspecified 10/29/2018    Allergy     Arthritis     Asthma 1989    Cancer 1991    cervical    CHF (congestive heart failure)     one episode, no follow up    Chronic back pain 1990    severe scoliosis    Depression     GERD (gastroesophageal reflux disease)     History of psychiatric care     History of psychiatric hospitalization     Menorrhagia with regular cycle 10/11/2018    Migraine     Primary dysmenorrhea 10/11/2018    Psychiatric exam requested by authority     Psychiatric problem     Scoliosis 1978    Therapy        Past Surgical History:   Procedure Laterality Date    APPENDECTOMY  1991    COLPOSCOPY      EPIDURAL STEROID INJECTION INTO LUMBAR SPINE      HYSTERECTOMY  2018    LAPAROSCOPIC CHOLECYSTECTOMY WITH CHOLANGIOGRAPHY N/A 3/11/2021    Procedure: CHOLECYSTECTOMY, LAPAROSCOPIC, WITH CHOLANGIOGRAM;  Surgeon: Deshawn Mccracken III, MD;  Location: [facility] OR;  Service: General;  Laterality: N/A;    LAPAROSCOPIC SALPINGO-OOPHORECTOMY  10/29/2018    Procedure: SALPINGO-OOPHORECTOMY, LAPAROSCOPIC;  Surgeon: Matthew Rene MD;  Location: [facility] OR;  Service: OB/GYN;;     LAPAROSCOPIC TOTAL HYSTERECTOMY N/A 10/29/2018    Procedure: HYSTERECTOMY, TOTAL, LAPAROSCOPIC;  Surgeon: Matthew Rene MD;  Location: [facility] OR;  Service: OB/GYN;  Laterality: N/A;    MULTIPLE TOOTH EXTRACTIONS      NASAL SEPTOPLASTY Bilateral 9/25/2020    Procedure: SEPTOPLASTY, NASAL;  Surgeon: Surinder Bobby MD;  Location: [facility] OR;  Service: ENT;  Laterality: Bilateral;    NASAL TURBINATE REDUCTION Bilateral 9/25/2020    Procedure: REDUCTION, NASAL TURBINATE;  Surgeon: Surinder Bobby MD;  Location: [facility] OR;  Service: ENT;  Laterality: Bilateral;    RHIZOTOMY W/ RADIOFREQUENCY ABLATION Bilateral     UMBILICAL HERNIA REPAIR N/A 2/21/2022    Procedure: REPAIR, HERNIA, UMBILICAL, AGE 5 YEARS OR OLDER;  Surgeon: Deshawn Mccracken III, MD;  Location: [facility] OR;  Service: General;  Laterality: N/A;       Family History   Adopted: Yes   Problem Relation Age of Onset    Breast cancer Mother     Breast cancer Maternal Grandmother        Social History     Socioeconomic History    Marital status: Legally    Tobacco Use    Smoking status: Current Every Day Smoker     Packs/day: 1.00     Years: 40.00     Pack years: 40.00     Types: Cigarettes    Smokeless tobacco: Never Used    Tobacco comment: quit cold turkey twicce for 5 years with no difficulty.   Substance and Sexual Activity    Alcohol use: No    Drug use: No    Sexual activity: Yes     Partners: Male     Birth control/protection: None   Other Topics Concern    Patient feels they ought to cut down on drinking/drug use No    Patient annoyed by others criticizing their drinking/drug use Yes    Patient has felt bad or guilty about drinking/drug use No    Patient has had a drink/used drugs as an eye opener in the AM No         Review of patient's allergies indicates:   Allergen Reactions    Codeine Nausea And Vomiting     If taken with tylenol    Skelaxin [metaxalone] Other (See Comments)     Burning in throat    Zoloft [sertraline]  Rash       Physical examination  Vitals Reviewed\  Vitals:    04/08/22 1115   BP: (!) 112/58   Pulse: 70   Temp: 98 °F (36.7 °C)     Body mass index is 23.85 kg/m². .FLOWAMB[14    Weight: 55.4 kg (122 lb 2.2 oz)    Gen. Well-dressed well-nourished   Skin warm dry and intact.  No rashes noted. Scattered insect bites noted w/o erythema, induration, drainage.   HEENT.  Bilateral cerumen impaction. After aggressive irrigation, slight canal excoriation on the right posterior to impaction with erythema. Unable to fully disimpact left ear, but 50% of TM visualized and noted to be intact. No mastoid tenderness during percussion.  Nares patent bilateral.  Pharynx is unremarkable no exudate.  No maxillary or frontal sinus tenderness when percussed.    Neck is supple without adenopathy.   Chest.  Respirations are even unlabored.  Lungs are clear to auscultation.  Cardiac regular rate and rhythm.  No chest wall adenopathy noted.  Neuro. Awake alert oriented x4.  Normal judgment and cognition noted.  Extremities no clubbing cyanosis or edema noted.     Call or return to clinic prn if these symptoms worsen or fail to improve as anticipated.

## 2022-04-11 ENCOUNTER — TELEPHONE (OUTPATIENT)
Dept: FAMILY MEDICINE | Facility: CLINIC | Age: 52
End: 2022-04-11
Payer: MEDICARE

## 2022-04-11 NOTE — TELEPHONE ENCOUNTER
----- Message from Roxane Ho sent at 4/11/2022 10:53 AM CDT -----  Regarding: pt called  Name of Who is Calling: CORINNE LOPEZ [[id number]]      What is the request in detail: pt has loss of taste and phlegm is now dark green. She has not been able to sleep. Please advise       Can the clinic reply by [facility]: No      What Number to Call Back [facility] not in [facility]: [phone]

## 2022-04-11 NOTE — TELEPHONE ENCOUNTER
Called and spoke with pt. Unable to get pt to come to clinic for [place] swab[place] Informed pt that she will need to go into a Urgent Care clinic closer to her. Pt verbalized understanding.

## 2022-04-11 NOTE — TELEPHONE ENCOUNTER
----- Message from Florencia Doll sent at 4/11/2022  1:31 PM CDT -----  Contact: Pt  Type: Needs Medical Advice    Who Called:  Pt    Best Call Back Number: [phone]    Additional Information: Pt wants to know if she can stop by office on Wednesday to have a Covid test.  Please call back.  Thanks.

## 2022-04-13 ENCOUNTER — TELEPHONE (OUTPATIENT)
Dept: FAMILY MEDICINE | Facility: CLINIC | Age: 52
End: 2022-04-13
Payer: MEDICARE

## 2022-04-13 DIAGNOSIS — J01.90 ACUTE SINUSITIS, RECURRENCE NOT SPECIFIED, UNSPECIFIED LOCATION: ICD-10-CM

## 2022-04-13 DIAGNOSIS — R68.83 CHILLS: Primary | ICD-10-CM

## 2022-04-13 DIAGNOSIS — R11.10 VOMITING, INTRACTABILITY OF VOMITING NOT SPECIFIED, PRESENCE OF NAUSEA NOT SPECIFIED, UNSPECIFIED VOMITING TYPE: ICD-10-CM

## 2022-04-13 RX ORDER — AMOXICILLIN AND CLAVULANATE POTASSIUM 875; 125 MG/1; MG/1
1 TABLET, FILM COATED ORAL EVERY 12 HOURS
Qty: 14 TABLET | Refills: 0 | Status: SHIPPED | OUTPATIENT
Start: 2022-04-13 | End: 2022-04-20

## 2022-04-13 NOTE — TELEPHONE ENCOUNTER
Pt stated she has not improved since visit on 04/08/2022 with Lexx Puentes. She still has chills, sore throat, sinus pressure and a productive cough producing green mucus. Pt is requesting antibiotic.

## 2022-04-13 NOTE — TELEPHONE ENCOUNTER
----- Message from Ramon Arana sent at 4/13/2022  2:03 PM CDT -----  Type:  Patient Returning Call    Who Called: Patient  Who Left Message for Patient: Sunita  Does the patient know what this is regarding?:  Antibiotic  Best Call Back Number:  [phone]  Additional Information:

## 2022-04-13 NOTE — TELEPHONE ENCOUNTER
I will send in an antibiotic for the patient. Take with food to prevent upset stomach. She tested negative for flu. We can swab her for COVID. Please schedule her for a nurse visit to be swabbed. If she tests positive the antibiotics will not improve her problem and she will just need symptomatic treatment.

## 2022-04-21 ENCOUNTER — TELEPHONE (OUTPATIENT)
Dept: FAMILY MEDICINE | Facility: CLINIC | Age: 52
End: 2022-04-21
Payer: MEDICARE

## 2022-04-21 RX ORDER — AZITHROMYCIN 250 MG/1
TABLET, FILM COATED ORAL
Qty: 6 TABLET | Refills: 0 | Status: SHIPPED | OUTPATIENT
Start: 2022-04-21 | End: 2022-05-25 | Stop reason: ALTCHOICE

## 2022-04-21 NOTE — TELEPHONE ENCOUNTER
Patient advised that she is still having sinus congestion, no appetite, and feels like a hair is stuck in her throat constantly. She has one more dose of Augmentin tonight.

## 2022-04-21 NOTE — TELEPHONE ENCOUNTER
----- Message from Florencia Doll sent at 4/21/2022  2:08 PM CDT -----  Contact: Pt  Type: Needs Medical Advice    Who Called:  Pt    Symptoms (please be specific):  States she doesn't feel good so she isn't eating.    How long has patient had these symptoms:  5 days    Pharmacy name and phone #:    [facility] #[id number] [place] [address] [facility] [address] [address]  Phone: [phone] Fax: [phone]    Best Call Back Number: [phone]    Additional Information: Please call back.  Thanks.

## 2022-05-24 ENCOUNTER — TELEPHONE (OUTPATIENT)
Dept: FAMILY MEDICINE | Facility: CLINIC | Age: 52
End: 2022-05-24
Payer: MEDICARE

## 2022-05-24 NOTE — TELEPHONE ENCOUNTER
----- Message from Dash Patrick sent at 5/24/2022  2:05 PM CDT -----  Regarding: Pt Advice  Contact: CORINNE LOPEZ [[id number]]  Name of Who is Calling: CORINNE LOPEZ [[id number]]      What is the request in detail: Would like to speak with staff in regards to vomiting (yellowish) for 3 days and a cortisone injection for itching she has been experiencing. States she has been using lotion, Aveeno baths, hydrocodone cream, and xyzal which are not helping. Please advise, pt refused an appt at this time.       Can the clinic reply by [facility]: no      What Number to Call Back [facility] not in [facility]: [phone]

## 2022-05-25 ENCOUNTER — OFFICE VISIT (OUTPATIENT)
Dept: FAMILY MEDICINE | Facility: CLINIC | Age: 52
End: 2022-05-25
Payer: MEDICARE

## 2022-05-25 VITALS
WEIGHT: 109.13 LBS | HEART RATE: 90 BPM | OXYGEN SATURATION: 98 % | DIASTOLIC BLOOD PRESSURE: 74 MMHG | BODY MASS INDEX: 21.42 KG/M2 | SYSTOLIC BLOOD PRESSURE: 110 MMHG | HEIGHT: 60 IN

## 2022-05-25 DIAGNOSIS — L29.9 GENERALIZED PRURITUS: ICD-10-CM

## 2022-05-25 DIAGNOSIS — R11.2 NAUSEA AND VOMITING, INTRACTABILITY OF VOMITING NOT SPECIFIED, UNSPECIFIED VOMITING TYPE: Primary | ICD-10-CM

## 2022-05-25 PROCEDURE — 99214 OFFICE O/P EST MOD 30 MIN: CPT | Mod: S$PBB,,, | Performed by: FAMILY MEDICINE

## 2022-05-25 PROCEDURE — 99214 OFFICE O/P EST MOD 30 MIN: CPT | Mod: PBBFAC,PO | Performed by: FAMILY MEDICINE

## 2022-05-25 PROCEDURE — 99214 PR OFFICE/OUTPT VISIT, EST, LEVL IV, 30-39 MIN: ICD-10-PCS | Mod: S$PBB,,, | Performed by: FAMILY MEDICINE

## 2022-05-25 PROCEDURE — 99999 PR PBB SHADOW E&M-EST. PATIENT-LVL IV: CPT | Mod: PBBFAC,,, | Performed by: FAMILY MEDICINE

## 2022-05-25 PROCEDURE — 99999 PR PBB SHADOW E&M-EST. PATIENT-LVL IV: ICD-10-PCS | Mod: PBBFAC,,, | Performed by: FAMILY MEDICINE

## 2022-05-25 RX ORDER — ONDANSETRON HYDROCHLORIDE 8 MG/1
8 TABLET, FILM COATED ORAL 2 TIMES DAILY
Qty: 15 TABLET | Refills: 0 | Status: SHIPPED | OUTPATIENT
Start: 2022-05-25 | End: 2023-03-16 | Stop reason: ALTCHOICE

## 2022-05-25 NOTE — PROGRESS NOTES
Subjective:       Patient ID: Alisson Eldridge is a 51 y.o. female.    Chief Complaint: No chief complaint on file.    New to me patient here for UC visit.  Pt reports she had generalized itching; no rash other than a few scattered bug bites.  Onset a couple days after eating fast food.  Next started with N and V - last emesis was yesterday.  No fever; some chills and sweats.  No diarrhea.  Some abd pain - mid-epi area and post-prandial.  On daily Omeprazole; added OTC Cimetidine (itching has stopped since as well).  No change in color of urine.   Also takes daily Ceterizine.    Review of Systems   Constitutional: Positive for chills and diaphoresis. Negative for fever.   Respiratory: Negative for shortness of breath.    Cardiovascular: Negative for chest pain.   Gastrointestinal: Positive for abdominal pain, nausea and vomiting. Negative for diarrhea.   Skin: Negative for rash.   All other systems reviewed and are negative.      Objective:      Physical Exam  Constitutional:       Appearance: She is well-developed. She is not ill-appearing.      Comments: Smells of smoke   HENT:      Mouth/Throat:      Mouth: Mucous membranes are moist.   Eyes:      General: No scleral icterus.     Conjunctiva/sclera: Conjunctivae normal.      Pupils: Pupils are equal, round, and reactive to light.   Cardiovascular:      Rate and Rhythm: Normal rate and regular rhythm.      Heart sounds: No murmur heard.  Pulmonary:      Effort: Pulmonary effort is normal.      Breath sounds: Normal breath sounds.   Abdominal:      General: Bowel sounds are normal.      Palpations: Abdomen is soft.      Tenderness: There is abdominal tenderness (mid-epi area only). There is no guarding.   Musculoskeletal:         General: No tenderness.      Cervical back: Neck supple.   Lymphadenopathy:      Cervical: No cervical adenopathy.   Skin:     General: Skin is warm and dry.   Neurological:      Mental Status: She is alert.         Assessment:       1.  Nausea and vomiting, intractability of vomiting not specified, unspecified vomiting type    2. Generalized pruritus        DDx - food borne GE vs viral AGE vs exac Gastritis/GERD.    Plan:       Nausea and vomiting, intractability of vomiting not specified, unspecified vomiting type  -     ondansetron (ZOFRAN) 8 MG tablet; Take 1 tablet (8 mg total) by mouth 2 (two) times daily.  Dispense: 15 tablet; Refill: 0    Generalized pruritus      Patient Instructions   Continue the OTC Cimetidine.  It can help both the itching and your stomach upset.

## 2022-05-26 ENCOUNTER — TELEPHONE (OUTPATIENT)
Dept: FAMILY MEDICINE | Facility: CLINIC | Age: 52
End: 2022-05-26
Payer: MEDICARE

## 2022-05-26 DIAGNOSIS — R50.9 FEVER, UNSPECIFIED FEVER CAUSE: Primary | ICD-10-CM

## 2022-05-26 DIAGNOSIS — R05.9 COUGH: ICD-10-CM

## 2022-05-26 NOTE — TELEPHONE ENCOUNTER
----- Message from Ramon Arana sent at 5/26/2022  9:05 AM CDT -----  Type:  Patient Returning Call    Who Called:  Patient  Who Left Message for Patient:  Kate  Does the patient know what this is regarding?: Caller states that she forgot to mention that she received a text from [employer] and she would like to know why she received one  Best Call Back Number:  [phone]  Additional Information:

## 2022-05-26 NOTE — TELEPHONE ENCOUNTER
----- Message from Kimberly Miranda sent at 5/26/2022  7:33 AM CDT -----  Regarding: advice  Contact: Patient/[phone] (home)  Type: Needs Medical Advice  Who Called:  Patient/[phone] (home)       Pharmacy name and phone #:    [facility] #[id number] - [address] & [address] [address]  Phone: [phone] Fax: [phone]       Additional Information: Wanted to let the office know that she has been excessively hot or freezing. She is having mucus that is colored dark green and sometimes black. This has been going on for 4 days. She has been itching for a week and seen Dr. Villegas yesterday about this. Please call for more details about other things that are going on with her.

## 2022-05-26 NOTE — TELEPHONE ENCOUNTER
I'm concerned about her coughing up mucous. She needs a cxr, covid, and flu tests. I will place those orders.   Is she still itching?   We can address the colo guard stuff later....

## 2022-05-26 NOTE — TELEPHONE ENCOUNTER
Please see previous note. Pt was seen 5/25/2022, was not satisfied with her visit. She states the vomiting has stopped. Please adivse if you would like to send med to pt [facility].

## 2022-05-27 ENCOUNTER — TELEPHONE (OUTPATIENT)
Dept: FAMILY MEDICINE | Facility: CLINIC | Age: 52
End: 2022-05-27
Payer: MEDICARE

## 2022-05-27 NOTE — TELEPHONE ENCOUNTER
----- Message from Raomn Arana sent at 5/27/2022  2:36 PM CDT -----  Type: Needs Medical Advice  Who Called:  Patient    Best Call Back Number: [phone]  Additional Information:  Patient states that she would like a callback regarding rescheduling the Nurse Visit and X ray that she had missed.

## 2022-05-30 ENCOUNTER — TELEPHONE (OUTPATIENT)
Dept: FAMILY MEDICINE | Facility: CLINIC | Age: 52
End: 2022-05-30
Payer: MEDICARE

## 2022-05-30 NOTE — TELEPHONE ENCOUNTER
----- Message from Roldan Patrick sent at 5/30/2022  9:16 AM CDT -----  Contact: pt at  Type:  Sooner Appointment Request    Caller is requesting a sooner appointment.  Caller declined first available appointment listed below.  Caller will not accept being placed on the waitlist and is requesting a message be sent to doctor.  Name of Caller:  pt  When is the first available appointment?  N/a  Symptoms:  Covid/FIu  Best Call Back Number:  [phone]  Additional Information:  pt is calling the office to reschedule her appt to 6/1/22 for 2pm but that date isn't coming up. Please call back and advise

## 2022-06-01 ENCOUNTER — HOSPITAL ENCOUNTER (OUTPATIENT)
Dept: RADIOLOGY | Facility: HOSPITAL | Age: 52
Discharge: HOME | End: 2022-06-01
Attending: NURSE PRACTITIONER
Payer: MEDICARE

## 2022-06-01 ENCOUNTER — CLINICAL SUPPORT (OUTPATIENT)
Dept: FAMILY MEDICINE | Facility: CLINIC | Age: 52
End: 2022-06-01
Payer: MEDICARE

## 2022-06-01 DIAGNOSIS — R50.9 FEVER, UNSPECIFIED FEVER CAUSE: ICD-10-CM

## 2022-06-01 DIAGNOSIS — R05.9 COUGH: ICD-10-CM

## 2022-06-01 LAB
CTP QC/QA: YES
INFLUENZA A, MOLECULAR: NEGATIVE
INFLUENZA B, MOLECULAR: NEGATIVE
SARS-COV-2 RDRP RESP QL NAA+PROBE: NEGATIVE
SPECIMEN SOURCE: NORMAL

## 2022-06-01 PROCEDURE — 71046 XR CHEST PA AND LATERAL: ICD-10-PCS | Mod: 26,,, | Performed by: RADIOLOGY

## 2022-06-01 PROCEDURE — U0002 COVID-19 LAB TEST NON-CDC: HCPCS | Mod: PBBFAC,PO

## 2022-06-01 PROCEDURE — 87502 INFLUENZA DNA AMP PROBE: CPT | Mod: PO | Performed by: NURSE PRACTITIONER

## 2022-06-01 PROCEDURE — 71046 X-RAY EXAM CHEST 2 VIEWS: CPT | Mod: TC,FY

## 2022-06-01 PROCEDURE — 71046 X-RAY EXAM CHEST 2 VIEWS: CPT | Mod: 26,,, | Performed by: RADIOLOGY

## 2022-06-01 NOTE — PROGRESS NOTES
Pt seen today for curbside covid and flu swab. Pt tolerated well. Advised that results could take up to 30 mins. All understanding.

## 2022-06-02 ENCOUNTER — TELEPHONE (OUTPATIENT)
Dept: FAMILY MEDICINE | Facility: CLINIC | Age: 52
End: 2022-06-02
Payer: MEDICARE

## 2022-06-02 RX ORDER — DOXYCYCLINE HYCLATE 100 MG
100 TABLET ORAL 2 TIMES DAILY
Qty: 20 TABLET | Refills: 0 | Status: SHIPPED | OUTPATIENT
Start: 2022-06-02 | End: 2023-02-19

## 2022-06-02 NOTE — TELEPHONE ENCOUNTER
----- Message from Veronica Chang sent at 6/2/2022 12:07 PM CDT -----  Regarding: Call back  Contact: [phone]  Type:  Patient Call Back    Who Called:pt  What this is regarding?: FORGOT TO ASK QUESTION   Would the patient rather a call back or a response via [facility]? Call back  Best Call Back Number:[phone]   Additional Information:     Advised to call back directly if there are further questions, or if these symptoms fail to improve as anticipated or worsen.

## 2022-06-02 NOTE — TELEPHONE ENCOUNTER
Spoke with pt via phone. Pt had questions in regards to regular allergy meds. Advised that these were safe. All understanding.

## 2022-06-06 ENCOUNTER — TELEPHONE (OUTPATIENT)
Dept: FAMILY MEDICINE | Facility: CLINIC | Age: 52
End: 2022-06-06
Payer: MEDICARE

## 2022-06-06 NOTE — TELEPHONE ENCOUNTER
----- Message from Madison Pillai sent at 6/6/2022 12:18 PM CDT -----  Who Called: Patient    What is the reqeust in detail: Requesting call back to discuss left leg swelling and nausea as well as her use of the recent medication and how it may not be working. Please advise.     Can the clinic reply by [facility]? No    Best Call Back Number: [phone]    Additional Information:

## 2022-06-22 ENCOUNTER — TELEPHONE (OUTPATIENT)
Dept: FAMILY MEDICINE | Facility: CLINIC | Age: 52
End: 2022-06-22
Payer: MEDICARE

## 2022-06-22 NOTE — TELEPHONE ENCOUNTER
----- Message from Wanda Jackson sent at 6/22/2022  3:20 PM CDT -----  Regarding: self  .Type:  Patient Returning Call    Who Called: self     Who Left Message for Patient: Abe Hawkins    Does the patient know what this is regarding? Yes     Would the patient rather a call back or a response via [facility]? Call back     Best Call Back Number: .[phone]

## 2022-06-22 NOTE — TELEPHONE ENCOUNTER
----- Message from Fariha Brown sent at 6/22/2022 12:08 PM CDT -----  .Type:  Patient Returning Call    Who Called: PT     Who Left Message for Patient: Roseline Estevez    Does the patient know what this is regarding?: MISSED CALL     Would the patient rather a call back YES      Best Call Back Number: [phone]    Additional Information: NONE

## 2022-06-22 NOTE — TELEPHONE ENCOUNTER
----- Message from Nicole Powell sent at 6/22/2022 10:49 AM CDT -----  Type: Patient Call Back         Who called: Pt          What is the request in detail: Pt called in regarding sores in her mouth and was wondering if possible the Dr can call in something ?         Can the clinic reply by [facility]?no          Would the patient rather a call back or a response via [facility]?call back          Best call back number:[phone] (mobile)          Additional Information:           Thank You

## 2022-06-22 NOTE — TELEPHONE ENCOUNTER
Spoke with pt via phone. She states she has ulcer in mouth that started 2 days ago. She states that she has treated with salt water and otc medicated oragel with no relief. Pt would like to know if something can be sent to pharmacy. Please advise.

## 2022-06-23 NOTE — TELEPHONE ENCOUNTER
[facility] [place] [address] [address] [facility] [address] [facility]  Pema Moots: [phone]  F: [phone]      Reason for Visit:   Angel Antunez is a 66 y.o. female who is seen in self-referral for evaluation of pancreatic cancer. Treatment History:   · 10/4/18 pancreatic head mass FNA, pancreatic adenocarcinoma    10/15/18  Liver, Core Biopsy w/Touch Prep CYTOLOGIC INTERPRETATION:   · Numerous cores and fragments of benign hepatic parenchyma     10/24/18  Liver, Fine Needle Aspiration CYTOLOGIC INTERPRETATION:   Metastatic adenocarcinoma (see Comment). General Categorization   Positive for malignancy. Comment: The morphologic appearance is nonspecific with regard to site of origin but compatible with metastatic pancreatic carcinoma in this patient with known pancreatic adenocarcinoma ([id number]). 11/9/18 abraxane/gemcitabine    History of Present Illness:   She started having abdominal pain, gradual and persistent, x 1 month, pressure sensation, located in epigastrium, no radiation, no aggravating symptoms, no relieving factors. 25 lb weight loss over 6 months.  + nausea. Interval history:  In today for follow up. Complains of gr 1 loss of appetite, gr 1 constipation, gr 1 fatigue, gr 1 hair loss, gr 1 insomnia, gr 1 cough, gr 1 sob, gr 1 urinary leakage. She has recently been sick s/p z vilma, sudafed, flonase, robitussin. Past Medical History:   Diagnosis Date    Arthritis     Constipation     Diabetes ([address]     Hemorrhoids     Hiatal hernia     High cholesterol     Hypertension     Pancreatic cancer ([address]       Past Surgical History:   Procedure Laterality Date    HX KNEE ARTHROSCOPY Right     HX ORTHOPAEDIC      left wrist surgery    HX TONSILLECTOMY        Social History     Tobacco Use    Smoking status: Never Smoker    Smokeless tobacco: Never Used   Substance Use Topics    Alcohol use:  Yes     Alcohol/week: 1.2 - 2.4 oz     Types: 1 - 2 Magic mouth wash sent   Cans of beer, 1 - 2 Shots of liquor per week     Frequency: 2-4 times a month     Drinks per session: 1 or 2      Family History   Problem Relation Age of Onset    Cancer Mother         skin    Hypertension Mother     Heart Disease Mother     Cancer Father         skin    Heart Disease Father     Stroke Father     Diabetes Neg Hx      Current Outpatient Medications   Medication Sig    melatonin 3 mg tablet Take 12 mg by mouth nightly.  GRANIX 480 mcg/0.8 mL syrg injection INJECT CONTENTS OF 1 SYRINGE UNDER THE SKIN FOR 3 DAYS ONCE WEEKLY    glucose blood VI test strips (ACCU-CHEK RICHIE PLUS TEST STRP) strip Accu-Chek Richie Plus test strips   Check BS BID    OTHER Cranial prosthesis    Dx C25.7    omeprazole (PRILOSEC) 20 mg capsule Take 20 mg by mouth daily.  simvastatin (ZOCOR) 10 mg tablet Take  by mouth nightly.  telmisartan (MICARDIS) 80 mg tablet Take 80 mg by mouth daily.  hydroCHLOROthiazide (MICROZIDE) 12.5 mg capsule Take 12.5 mg by mouth daily.  insulin aspart U-100 (NOVOLOG FLEXPEN U-100 INSULIN) 100 unit/mL inpn Inject 3 units as needed for sugars over 200--Dose change 3/1919--updated med list--did not send prescription to the pharmacy    ferrous sulfate (IRON) 325 mg (65 mg iron) tablet Take  by mouth Daily (before breakfast).  IBUPROFEN IB PO Take  by mouth as needed.  ondansetron hcl (ZOFRAN) 8 mg tablet Take 1 Tab by mouth every eight (8) hours as needed for Nausea.  lidocaine-prilocaine (EMLA) topical cream Apply  to affected area as needed for Pain. No current facility-administered medications for this visit.       Facility-Administered Medications Ordered in Other Visits   Medication Dose Route Frequency    gemcitabine (GEMZAR) 1,960 mg in 0.9% sodium chloride 250 mL, overfill volume 25 mL chemo infusion  1,960 mg IntraVENous ONCE    PACLitaxel-Protein Bound (ABRAXANE) 245 mg chemo infusion  245 mg IntraVENous ONCE    dexamethasone (DECADRON) 4 mg/mL injection 8 mg  8 mg IntraVENous ONCE    prochlorperazine (COMPAZINE) with saline injection 10 mg  10 mg IntraVENous Q6H PRN      Allergies   Allergen Reactions    Amoxicillin Hives        Review of Systems: A comprehensive review of systems was performed and all systems were negative except for HPI and for the symptom review form, reviewed and scanned in. Physical Exam:     Visit Vitals  /73   Pulse 73   Temp 98.4 °F (36.9 °C) (Temporal)   Resp 18   Ht 5' 7\" (1.702 m)   Wt 193 lb 4.8 oz (87.7 kg)   SpO2 97%   BMI 30.28 kg/m²     ECOG PS: 0  General: No distress  Eyes: PERRLA, anicteric sclerae  HENT: Atraumatic, OP clear, no redness, swelling  Neck: Supple  Lymphatic: No cervical, supraclavicular, or inguinal adenopathy  Respiratory: CTAB, normal respiratory effort  CV: Normal rate, regular rhythm, no murmurs, no peripheral edema  GI: Soft, nontender, nondistended, no masses, no hepatomegaly, no splenomegaly  MS: Normal gait and station. Digits without clubbing or cyanosis. Skin: No rashes, ecchymoses, or petechiae. Normal temperature, turgor, and texture. Psych: Alert, oriented, appropriate affect, normal judgment/insight        Results:     Lab Results   Component Value Date/Time    WBC 20.2 (H) 03/27/2019 11:13 AM    HGB 8.2 (L) 03/27/2019 11:13 AM    HCT 26.0 (L) 03/27/2019 11:13 AM    PLATELET 938 (H) 78/46/8128 11:13 AM    MCV 94.9 03/27/2019 11:13 AM    ABS.  NEUTROPHILS 15.6 (H) 03/27/2019 11:13 AM     Lab Results   Component Value Date/Time    Sodium 130 (L) 03/27/2019 11:13 AM    Potassium 3.2 (L) 03/27/2019 11:13 AM    Chloride 97 03/27/2019 11:13 AM    CO2 31 03/27/2019 11:13 AM    Glucose 190 (H) 03/27/2019 11:13 AM    BUN 8 03/27/2019 11:13 AM    Creatinine 0.93 03/27/2019 11:13 AM    GFR est AA >60 03/27/2019 11:13 AM    GFR est non-AA 59 (L) 03/27/2019 11:13 AM    Calcium 8.0 (L) 03/27/2019 11:13 AM    Glucose (POC) 316 (H) 10/04/2018 03:45 PM     Lab Results   Component Value Date/Time    Bilirubin, total 0.4 03/27/2019 11:13 AM    ALT (SGPT) 21 03/27/2019 11:13 AM    AST (SGOT) 18 03/27/2019 11:13 AM    Alk. phosphatase 177 (H) 03/27/2019 11:13 AM    Protein, total 6.1 (L) 03/27/2019 11:13 AM    Albumin 2.8 (L) 03/27/2019 11:13 AM    Globulin 3.3 03/27/2019 11:13 AM     10/1/18 CT abd  There is a 3.8 x 1.9 cm mass involving the uncinate process and possibly  invading the duodenum. Findings are nonspecific but typical of pancreatic  carcinoma. There is no biliary or pancreatic ductal dilatation.     There is a poorly defined irregular hypodensity in segment IVb of the liver  measuring 3.7 x 1.9 cm. There appears to be focal biliary dilatation in the left  lobe behind this abnormality. Metastatic disease is suspected. There is a small,  1 cm hypodensity in the dome of the liver, likely segment 8. There is a 1 cm  hypodensity in segment 4 of the liver as well, also likely metastasis. Small  hypodensity measuring less than 1 cm in segment 6 at the tip laterally is also  nonspecific but probable metastasis.     Spleen kidneys and adrenals are unremarkable.     No free fluid or focal fluid collection.     Lung bases are clear.     Bone windows are unremarkable.     IMPRESSION  IMPRESSION:  1. 3.8 x 1.9 cm mass involving the uncinate process of the pancreas and possibly  invading the duodenum, this likely represents pancreatic carcinoma. 2. Likely metastatic disease in the liver. There is a 3.8 x 1.9 cm mass involving the uncinate process and possibly  invading the duodenum. Findings are nonspecific but typical of pancreatic  carcinoma. There is no biliary or pancreatic ductal dilatation.     There is a poorly defined irregular hypodensity in segment IVb of the liver  measuring 3.7 x 1.9 cm. There appears to be focal biliary dilatation in the left  lobe behind this abnormality. Metastatic disease is suspected. There is a small,  1 cm hypodensity in the dome of the liver, likely segment 8. There is a 1 cm  hypodensity in segment 4 of the liver as well, also likely metastasis. Small  hypodensity measuring less than 1 cm in segment 6 at the tip laterally is also  nonspecific but probable metastasis.     Spleen kidneys and adrenals are unremarkable.     No free fluid or focal fluid collection.     Lung bases are clear.     Bone windows are unremarkable.     IMPRESSION  IMPRESSION:  1. 3.8 x 1.9 cm mass involving the uncinate process of the pancreas and possibly  invading the duodenum, this likely represents pancreatic carcinoma. 2. Likely metastatic disease in the liver. Records reviewed and summarized above. Pathology report(s) reviewed above. Radiology report(s) reviewed above. MRI abdomen 10/22/18: IMPRESSION:       1. Pancreatic uncinate process mass suspicious for pancreatic neoplasm, possibly  adenocarcinoma. Mass abuts the superior mesenteric artery with about 20%  circumference involvement but no luminal distortion or perivascular stranding. 2. Multiple hepatic lesions suspicious for metastatic neoplasm with segment IVb  lesion showing patchy areas of surrounding steatosis likely secondary to locally  altered intrahepatic hemodynamics and likely responsible for biopsy result. 3. Cholecystolithiasis and small gallbladder polyp. CT c/a/p 12/28/18: IMPRESSION:  Interval decrease in size of pancreatic mass. Slight interval decrease in size of hepatic metastases; these now demonstrate central low attenuation suggestive of necrosis. No evidence of new metastatic disease in the chest, abdomen, or pelvis.     Assessment/plan:   1. Uncinate pancreatic adenocarcinoma,  mets to liver, stage IV:  cT2 cN0 pM1    Discussed that while this is treatable, it is not curable, the goal of therapy is palliative. Discussed that without therapy, life expectancy would be 3-6 months, with therapy 12-18 months on average.     As an example of likely chemotherapy, we discussed the risks and benefits of Gemcitabine and Abraxane chemotherapy. Potential side effects include, but are not limited to, nausea, vomiting, diarrhea, constipation, mucositis, taste changes, myelosuppression, infection, fatigue, alopecia, skin and nail changes, pulmonary toxicity, neuropathy, allergic reactions, infertility, and rarely, death. Discussed the BBI-608 study and the research nurse met with her today and she signed informed consent. · Gemcitabine (1000 mg/m2) and Abraxane (125 mg/m2) given on days 1,8,15 every 28 days. · Labs: CBC, BMP prior to each treatment. Hepatic function panel every 4 weeks. CA 19.9 prior to day 1  · Antiemetic prophylaxis: Dexamethasone prior to each treatment  · PRN antiemetics: Ondansetron and Prochlorperazine at home  · EMLA cream for port    On the control arm, C6d1 today, will see her back in 2 weeks for C6D15. CT c/a/p on 2/25/19 show response to treatment, next scans around 4/22/19. We will plan to see the patient in follow up at least once per cycle, or sooner if symptoms warrant. 2. DM2:  Holding metformin; on insulin; saw Dr. Geo Johnson; her BS are improving; she is now taking less insulin    3. Emotional well being:  She has excellent support and is coping well with her disease    4. Abdominal pain:  Minor, may be due to constipation;  palliative care has seen    5. Nutrition:  Loida Spivey RD has met with her; holding on enzymes    6. Insomnia: Has improved. She has lunesta if needed; she may also try melatonin    7. Anemia:  Due to chemo and disease; monitor    8. Neutropenia:  Due to chemo, started granix 480 mcg for 4 days following chemo with C1D15. Does report some pain with granix. Tried claritin, however this kept her awake. Recommend trying tylenol and advil PRN. Now at 2 days following chemo    9. Constipation:  Improved    10. Sinus congestion/cold like symptoms:  Started after her son's wedding, s/p zpak. Currently taking robitussin and using flonase.   Cough has improved, however severe ear congestion. Has tried sudafed however this causes severe insomnia. Patient states she will retry sudafed while taking her steroids since she has insomnia during the time. No redness, swelling noted during physical exam.  If this does not get better patient to notify us and will refer to ENT, Dr. Ina Baumann. Signed By: Saima Longoria MD          [facility] [place] [id number] [facility], [address] [place] [address]  W: [phone]  F: [phone]      Reason for Visit:   Tricia Orourke is a 66 y.o. female who is seen in self-referral for evaluation of pancreatic cancer. Treatment History:   · 10/4/18 pancreatic head mass FNA, pancreatic adenocarcinoma    10/15/18  Liver, Core Biopsy w/Touch Prep CYTOLOGIC INTERPRETATION:   · Numerous cores and fragments of benign hepatic parenchyma     10/24/18  Liver, Fine Needle Aspiration CYTOLOGIC INTERPRETATION:   Metastatic adenocarcinoma (see Comment). General Categorization   Positive for malignancy. Comment: The morphologic appearance is nonspecific with regard to site of origin but compatible with metastatic pancreatic carcinoma in this patient with known pancreatic adenocarcinoma ([id number]). 11/9/18 abraxane/gemcitabine    History of Present Illness:   She started having abdominal pain, gradual and persistent, x 1 month, pressure sensation, located in epigastrium, no radiation, no aggravating symptoms, no relieving factors. 25 lb weight loss over 6 months.  + nausea. Interval history:  In today for follow up.   Complains of gr 1 constipation, gr 1 fatigue, gr 1 hair loss, gr 1 insomnia    Past Medical History:   Diagnosis Date    Arthritis     Constipation     Diabetes ([address]     Hemorrhoids     Hiatal hernia     High cholesterol     Hypertension     Pancreatic cancer [facility])       Past Surgical History:   Procedure Laterality Date    HX KNEE ARTHROSCOPY Right     HX ORTHOPAEDIC      left wrist surgery    HX TONSILLECTOMY        Social History     Tobacco Use    Smoking status: Never Smoker    Smokeless tobacco: Never Used   Substance Use Topics    Alcohol use: Yes     Alcohol/week: 1.2 - 2.4 oz     Types: 1 - 2 Cans of beer, 1 - 2 Shots of liquor per week     Frequency: 2-4 times a month     Drinks per session: 1 or 2      Family History   Problem Relation Age of Onset    Cancer Mother         skin    Hypertension Mother     Heart Disease Mother     Cancer Father         skin    Heart Disease Father     Stroke Father     Diabetes Neg Hx      Current Outpatient Medications   Medication Sig    melatonin 3 mg tablet Take 12 mg by mouth nightly.  GRANIX 480 mcg/0.8 mL syrg injection INJECT CONTENTS OF 1 SYRINGE UNDER THE SKIN FOR 3 DAYS ONCE WEEKLY    glucose blood VI test strips (ACCU-CHEK RICHIE PLUS TEST STRP) strip Accu-Chek Richie Plus test strips   Check BS BID    OTHER Cranial prosthesis    Dx C25.7    omeprazole (PRILOSEC) 20 mg capsule Take 20 mg by mouth daily.  simvastatin (ZOCOR) 10 mg tablet Take  by mouth nightly.  telmisartan (MICARDIS) 80 mg tablet Take 80 mg by mouth daily.  hydroCHLOROthiazide (MICROZIDE) 12.5 mg capsule Take 12.5 mg by mouth daily.  insulin aspart U-100 (NOVOLOG FLEXPEN U-100 INSULIN) 100 unit/mL inpn Inject 3 units as needed for sugars over 200--Dose change 3/1919--updated med list--did not send prescription to the pharmacy    ferrous sulfate (IRON) 325 mg (65 mg iron) tablet Take  by mouth Daily (before breakfast).  IBUPROFEN IB PO Take  by mouth as needed.  ondansetron hcl (ZOFRAN) 8 mg tablet Take 1 Tab by mouth every eight (8) hours as needed for Nausea.  lidocaine-prilocaine (EMLA) topical cream Apply  to affected area as needed for Pain. No current facility-administered medications for this visit.       Facility-Administered Medications Ordered in Other Visits   Medication Dose Route Frequency    gemcitabine (GEMZAR) 1,960 mg in 0.9% sodium chloride 250 mL, overfill volume 25 mL chemo infusion  1,960 mg IntraVENous ONCE    PACLitaxel-Protein Bound (ABRAXANE) 245 mg chemo infusion  245 mg IntraVENous ONCE    dexamethasone (DECADRON) 4 mg/mL injection 8 mg  8 mg IntraVENous ONCE    prochlorperazine (COMPAZINE) with saline injection 10 mg  10 mg IntraVENous Q6H PRN      Allergies   Allergen Reactions    Amoxicillin Hives        Review of Systems: A comprehensive review of systems was performed and all systems were negative except for HPI and for the symptom review form, reviewed and scanned in. Physical Exam:     Visit Vitals  /73   Pulse 73   Temp 98.4 °F (36.9 °C) (Temporal)   Resp 18   Ht 5' 7\" (1.702 m)   Wt 193 lb 4.8 oz (87.7 kg)   SpO2 97%   BMI 30.28 kg/m²     ECOG PS: 0  General: No distress  Eyes: PERRLA, anicteric sclerae  HENT: Atraumatic, OP clear  Neck: Supple  Lymphatic: No cervical, supraclavicular, or inguinal adenopathy  Respiratory: CTAB, normal respiratory effort  CV: Normal rate, regular rhythm, no murmurs, no peripheral edema  GI: Soft, nontender, nondistended, no masses, no hepatomegaly, no splenomegaly  MS: Normal gait and station. Digits without clubbing or cyanosis. Skin: No rashes, ecchymoses, or petechiae. Normal temperature, turgor, and texture. Psych: Alert, oriented, appropriate affect, normal judgment/insight        Results:     Lab Results   Component Value Date/Time    WBC 20.2 (H) 03/27/2019 11:13 AM    HGB 8.2 (L) 03/27/2019 11:13 AM    HCT 26.0 (L) 03/27/2019 11:13 AM    PLATELET 642 (H) 75/81/0911 11:13 AM    MCV 94.9 03/27/2019 11:13 AM    ABS.  NEUTROPHILS 15.6 (H) 03/27/2019 11:13 AM     Lab Results   Component Value Date/Time    Sodium 130 (L) 03/27/2019 11:13 AM    Potassium 3.2 (L) 03/27/2019 11:13 AM    Chloride 97 03/27/2019 11:13 AM    CO2 31 03/27/2019 11:13 AM    Glucose 190 (H) 03/27/2019 11:13 AM    BUN 8 03/27/2019 11:13 AM    Creatinine 0.93 03/27/2019 11:13 AM    GFR est AA >60 03/27/2019 11:13 AM    GFR est non-AA 59 (L) 03/27/2019 11:13 AM    Calcium 8.0 (L) 03/27/2019 11:13 AM    Glucose (POC) 316 (H) 10/04/2018 03:45 PM     Lab Results   Component Value Date/Time    Bilirubin, total 0.4 03/27/2019 11:13 AM    ALT (SGPT) 21 03/27/2019 11:13 AM    AST (SGOT) 18 03/27/2019 11:13 AM    Alk. phosphatase 177 (H) 03/27/2019 11:13 AM    Protein, total 6.1 (L) 03/27/2019 11:13 AM    Albumin 2.8 (L) 03/27/2019 11:13 AM    Globulin 3.3 03/27/2019 11:13 AM     10/1/18 CT abd  There is a 3.8 x 1.9 cm mass involving the uncinate process and possibly  invading the duodenum. Findings are nonspecific but typical of pancreatic  carcinoma. There is no biliary or pancreatic ductal dilatation.     There is a poorly defined irregular hypodensity in segment IVb of the liver  measuring 3.7 x 1.9 cm. There appears to be focal biliary dilatation in the left  lobe behind this abnormality. Metastatic disease is suspected. There is a small,  1 cm hypodensity in the dome of the liver, likely segment 8. There is a 1 cm  hypodensity in segment 4 of the liver as well, also likely metastasis. Small  hypodensity measuring less than 1 cm in segment 6 at the tip laterally is also  nonspecific but probable metastasis.     Spleen kidneys and adrenals are unremarkable.     No free fluid or focal fluid collection.     Lung bases are clear.     Bone windows are unremarkable.     IMPRESSION  IMPRESSION:  1. 3.8 x 1.9 cm mass involving the uncinate process of the pancreas and possibly  invading the duodenum, this likely represents pancreatic carcinoma. 2. Likely metastatic disease in the liver. There is a 3.8 x 1.9 cm mass involving the uncinate process and possibly  invading the duodenum. Findings are nonspecific but typical of pancreatic  carcinoma.  There is no biliary or pancreatic ductal dilatation.     There is a poorly defined irregular hypodensity in segment IVb of the liver  measuring 3.7 x 1.9 cm. There appears to be focal biliary dilatation in the left  lobe behind this abnormality. Metastatic disease is suspected. There is a small,  1 cm hypodensity in the dome of the liver, likely segment 8. There is a 1 cm  hypodensity in segment 4 of the liver as well, also likely metastasis. Small  hypodensity measuring less than 1 cm in segment 6 at the tip laterally is also  nonspecific but probable metastasis.     Spleen kidneys and adrenals are unremarkable.     No free fluid or focal fluid collection.     Lung bases are clear.     Bone windows are unremarkable.     IMPRESSION  IMPRESSION:  1. 3.8 x 1.9 cm mass involving the uncinate process of the pancreas and possibly  invading the duodenum, this likely represents pancreatic carcinoma. 2. Likely metastatic disease in the liver. Records reviewed and summarized above. Pathology report(s) reviewed above. Radiology report(s) reviewed above. MRI abdomen 10/22/18: IMPRESSION:       1. Pancreatic uncinate process mass suspicious for pancreatic neoplasm, possibly  adenocarcinoma. Mass abuts the superior mesenteric artery with about 20%  circumference involvement but no luminal distortion or perivascular stranding. 2. Multiple hepatic lesions suspicious for metastatic neoplasm with segment IVb  lesion showing patchy areas of surrounding steatosis likely secondary to locally  altered intrahepatic hemodynamics and likely responsible for biopsy result. 3. Cholecystolithiasis and small gallbladder polyp. CT c/a/p 12/28/18: IMPRESSION:  Interval decrease in size of pancreatic mass. Slight interval decrease in size of hepatic metastases; these now demonstrate central low attenuation suggestive of necrosis. No evidence of new metastatic disease in the chest, abdomen, or pelvis.     CT c/a/p 2/25/19:  LIVER: The lesions described on the prior examination have improved in the  interval. 19 mm lesion in the dome of the liver now measures 1 cm. 2.0 x 1.5 cm  lesion in the left hepatic lobe now measures 1.3 x 1.2 cm. Other smaller lesions  are no longer visualized. GALLBLADDER: Unremarkable. SPLEEN: No mass. PANCREAS: Mass lesion in the uncinate process now measures 1.7 x 2.0 cm,  previously measuring 2.0 x 3.0 cm. ADRENALS: Unremarkable. KIDNEYS: No mass, calculus, or hydronephrosis. STOMACH: Unremarkable. SMALL BOWEL: No dilatation or wall thickening. COLON: No dilatation or wall thickening. APPENDIX: Unremarkable. PERITONEUM: No ascites or pneumoperitoneum. RETROPERITONEUM: No lymphadenopathy or aortic aneurysm. REPRODUCTIVE ORGANS: Intrauterine device projects in satisfactory position. URINARY BLADDER: No mass or calculus. BONES: Degenerative changes are seen in the thoracic and lumbar spine. ADDITIONAL COMMENTS: N/A     IMPRESSION  IMPRESSION:  Interval decrease in the size of the hepatic metastases. Decrease in the size of  the mass lesion involving the uncinate process.     RECIST:  Pancreatic uncinate mass: Current: (68) 1.7X2.0cm     12/28/2018: (71) 3.0 x  2.0 cm   Segment 4B liver mass: Current: (56) 1.3 x 1.2 cm 12/28/2018: (57) 2.0 x 1.5 cm   Segment 5 liver mass: Current: (68) 4 x 7 mm 12/28/2018: (70) 1.4 x 0.9 cm     Assessment/plan:   1. Uncinate pancreatic adenocarcinoma,  mets to liver, stage IV:  cT2 cN0 pM1    Discussed that while this is treatable, it is not curable, the goal of therapy is palliative. Discussed that without therapy, life expectancy would be 3-6 months, with therapy 12-18 months on average. As an example of likely chemotherapy, we discussed the risks and benefits of Gemcitabine and Abraxane chemotherapy. Potential side effects include, but are not limited to, nausea, vomiting, diarrhea, constipation, mucositis, taste changes, myelosuppression, infection, fatigue, alopecia, skin and nail changes, pulmonary toxicity, neuropathy, allergic reactions, infertility, and rarely, death.      Discussed the BBI-608 study and the research nurse met with her today and she signed informed consent. · Gemcitabine (1000 mg/m2) and Abraxane (125 mg/m2) given on days 1,8,15 every 28 days. · Labs: CBC, BMP prior to each treatment. Hepatic function panel every 4 weeks. CA 19.9 prior to day 1  · Antiemetic prophylaxis: Dexamethasone prior to each treatment  · PRN antiemetics: Ondansetron and Prochlorperazine at home  · EMLA cream for port    On the control arm, C6d1 today, will see her back in 2 weeks for C6D15. CT c/a/p on 2/25/19 show response to treatment, next scans due in April. We will plan to see the patient in follow up at least once per cycle, or sooner if symptoms warrant. 2. DM2:  Holding metformin; on insulin; saw Dr. Yeny Pearson; her BS are improving; she is now taking less insulin    3. Emotional well being:  She has excellent support and is coping well with her disease    4. Abdominal pain:  Minor, may be due to constipation;  palliative care has seen    5. Nutrition:  Geno Celis RD has met with her; holding on enzymes    6. Insomnia: Was improving however worse recently, states her son is getting  this weekend and moving Monday. Currently taking melatonin. She has lunesta if needed. 7. Anemia:  Due to chemo and disease; monitor; will check iron profile and ferritin    8. Neutropenia:  Due to chemo, started granix 480 mcg for 4 days following chemo with C1D15. Does report some pain with granix. Tried claritin, however this kept her awake. Recommend trying tylenol and advil PRN. Now at 2 days following chemo    9. Constipation:  Improved, now stools are little softer, so she is holding off on her stool softener, but she continues with Miralax. 10. Sinus congestion: Clear/bloody sinus drainage. Recommend OTC sudafed and saline spray.      Signed By: Yanick Bates MD

## 2022-07-05 ENCOUNTER — TELEPHONE (OUTPATIENT)
Dept: FAMILY MEDICINE | Facility: CLINIC | Age: 52
End: 2022-07-05
Payer: MEDICARE

## 2022-07-18 ENCOUNTER — TELEPHONE (OUTPATIENT)
Dept: FAMILY MEDICINE | Facility: CLINIC | Age: 52
End: 2022-07-18
Payer: MEDICARE

## 2022-07-18 NOTE — TELEPHONE ENCOUNTER
----- Message from Benedict Villafana sent at 7/18/2022  4:20 PM CDT -----  Contact: pt at [phone]  Type:  Patient Returning Call    Who Called:  pt  Who Left Message for Patient:  Abe  Does the patient know what this is regarding?:  yes  Best Call Back Number:  [phone]  Additional Information:  Please call back and advise.

## 2022-07-18 NOTE — TELEPHONE ENCOUNTER
----- Message from Lawson Caraballo sent at 7/18/2022  2:09 PM CDT -----  Contact: self  Type: Sooner Appointment Request        Caller is requesting a sooner appointment. Caller declined first available appointment listed below. Caller will not accept being placed on the waitlist and is requesting a message be sent to doctor.        Name of Caller: Patient  Symptoms:Sinus pressure is severe, runny nose, no high fever, sore throat, staying up at night.  When is the first available appointment? 7/25/2022  Best Call Back Number: [phone] (home)   Additional Information: Patient called today has bad sinus symptoms would like to be seen sooner. Plz call pt and get scheduled.

## 2022-07-19 ENCOUNTER — TELEPHONE (OUTPATIENT)
Dept: OTOLARYNGOLOGY | Facility: CLINIC | Age: 52
End: 2022-07-19
Payer: MEDICARE

## 2022-07-19 ENCOUNTER — OFFICE VISIT (OUTPATIENT)
Dept: FAMILY MEDICINE | Facility: CLINIC | Age: 52
End: 2022-07-19
Payer: MEDICARE

## 2022-07-19 VITALS
HEIGHT: 60 IN | WEIGHT: 110.25 LBS | SYSTOLIC BLOOD PRESSURE: 130 MMHG | HEART RATE: 69 BPM | BODY MASS INDEX: 21.65 KG/M2 | DIASTOLIC BLOOD PRESSURE: 62 MMHG | TEMPERATURE: 99 F | OXYGEN SATURATION: 98 %

## 2022-07-19 DIAGNOSIS — J32.9 BACTERIAL SINUSITIS: ICD-10-CM

## 2022-07-19 DIAGNOSIS — B96.89 BACTERIAL SINUSITIS: ICD-10-CM

## 2022-07-19 DIAGNOSIS — R05.9 COUGH: Primary | ICD-10-CM

## 2022-07-19 LAB
CTP QC/QA: YES
SARS-COV-2 RDRP RESP QL NAA+PROBE: NEGATIVE

## 2022-07-19 PROCEDURE — 99214 OFFICE O/P EST MOD 30 MIN: CPT | Mod: S$PBB,,, | Performed by: NURSE PRACTITIONER

## 2022-07-19 PROCEDURE — 99213 OFFICE O/P EST LOW 20 MIN: CPT | Mod: 25,PBBFAC,PO | Performed by: NURSE PRACTITIONER

## 2022-07-19 PROCEDURE — U0002 COVID-19 LAB TEST NON-CDC: HCPCS | Mod: PBBFAC,PO | Performed by: NURSE PRACTITIONER

## 2022-07-19 PROCEDURE — 99999 PR PBB SHADOW E&M-EST. PATIENT-LVL III: ICD-10-PCS | Mod: PBBFAC,,, | Performed by: NURSE PRACTITIONER

## 2022-07-19 PROCEDURE — 96372 THER/PROPH/DIAG INJ SC/IM: CPT | Mod: PBBFAC,PO

## 2022-07-19 PROCEDURE — 99999 PR PBB SHADOW E&M-EST. PATIENT-LVL III: CPT | Mod: PBBFAC,,, | Performed by: NURSE PRACTITIONER

## 2022-07-19 PROCEDURE — 99214 PR OFFICE/OUTPT VISIT, EST, LEVL IV, 30-39 MIN: ICD-10-PCS | Mod: S$PBB,,, | Performed by: NURSE PRACTITIONER

## 2022-07-19 RX ORDER — DEXAMETHASONE SODIUM PHOSPHATE 4 MG/ML
4 INJECTION, SOLUTION INTRA-ARTICULAR; INTRALESIONAL; INTRAMUSCULAR; INTRAVENOUS; SOFT TISSUE
Status: COMPLETED | OUTPATIENT
Start: 2022-07-19 | End: 2022-07-19

## 2022-07-19 RX ORDER — AMOXICILLIN 500 MG/1
500 CAPSULE ORAL 3 TIMES DAILY
Qty: 30 CAPSULE | Refills: 0 | Status: SHIPPED | OUTPATIENT
Start: 2022-07-19 | End: 2022-07-29

## 2022-07-19 RX ADMIN — DEXAMETHASONE SODIUM PHOSPHATE 4 MG: 4 INJECTION, SOLUTION INTRA-ARTICULAR; INTRALESIONAL; INTRAMUSCULAR; INTRAVENOUS; SOFT TISSUE at 11:07

## 2022-07-19 NOTE — TELEPHONE ENCOUNTER
----- Message from Giuliano Prasad sent at 7/19/2022  4:37 PM CDT -----  Type: Needs Medical Advice  Who Called:  pt   Symptoms (please be specific):  said she need to speak to the nurse to see if the dr can proform a procedure on her due to her sinus infections  Best Call Back Number: [phone] (home)     Additional Information: please advise--thank you

## 2022-07-19 NOTE — TELEPHONE ENCOUNTER
----- Message from Thai Covarrubias MA sent at 7/19/2022  4:54 PM CDT -----  Contact: CORINNE LOPEZ [[id number]]  Type: Needs Medical Advice    Who Called:CORINNE LOPEZ [[id number]]  Best Call Back Number: [phone]  Inquiry/Question: Please call CORINNE LOPEZ [[id number]] regarding concerns with missed call from office       Thank you~

## 2022-07-19 NOTE — PROGRESS NOTES
This dictation has been generated using Modal Fluency Dictation some phonetic errors may occur. Please contact author for clarification if needed.     Problem List Items Addressed This Visit    None     Visit Diagnoses     Cough    -  Primary    Bacterial sinusitis              Orders Placed This Encounter    dexamethasone injection 4 mg    amoxicillin (AMOXIL) 500 MG capsule     Cough and sinus congestion.  COVID test negative  Sinus congestion facial pain treat with meds as above.    No follow-ups on file.    ________________________________________________________________  ________________________________________________________________      Chief Complaint   Patient presents with    Sinus Problem     History of present illness  This 51 y.o. presents today for complaint of sinus symptoms.  Onset of symptoms less than a week ago.  Notes cough and sore throat.  Also notes the facial pressure and pain.  No known COVID exposure.  Denies fever chills body aches loss of taste or smell.  No nausea vomiting diarrhea.        Past Medical History:   Diagnosis Date    Abnormal uterine and vaginal bleeding, unspecified 10/29/2018    Allergy     Arthritis     Asthma 1989    Cancer 1991    cervical    CHF (congestive heart failure)     one episode, no follow up    Chronic back pain 1990    severe scoliosis    Depression     GERD (gastroesophageal reflux disease)     History of psychiatric care     History of psychiatric hospitalization     Menorrhagia with regular cycle 10/11/2018    Migraine     Primary dysmenorrhea 10/11/2018    Psychiatric exam requested by authority     Psychiatric problem     Scoliosis 1978    Therapy        Past Surgical History:   Procedure Laterality Date    APPENDECTOMY  1991    COLPOSCOPY      EPIDURAL STEROID INJECTION INTO LUMBAR SPINE      HYSTERECTOMY  2018    LAPAROSCOPIC CHOLECYSTECTOMY WITH CHOLANGIOGRAPHY N/A 3/11/2021    Procedure: CHOLECYSTECTOMY, LAPAROSCOPIC,  WITH CHOLANGIOGRAM;  Surgeon: Deshawn Mccracken III, MD;  Location: [facility] OR;  Service: General;  Laterality: N/A;    LAPAROSCOPIC SALPINGO-OOPHORECTOMY  10/29/2018    Procedure: SALPINGO-OOPHORECTOMY, LAPAROSCOPIC;  Surgeon: Matthew Rene MD;  Location: [facility] OR;  Service: OB/GYN;;    LAPAROSCOPIC TOTAL HYSTERECTOMY N/A 10/29/2018    Procedure: HYSTERECTOMY, TOTAL, LAPAROSCOPIC;  Surgeon: Matthew Rene MD;  Location: [facility] OR;  Service: OB/GYN;  Laterality: N/A;    MULTIPLE TOOTH EXTRACTIONS      NASAL SEPTOPLASTY Bilateral 9/25/2020    Procedure: SEPTOPLASTY, NASAL;  Surgeon: Surinder Bobby MD;  Location: [facility] OR;  Service: ENT;  Laterality: Bilateral;    NASAL TURBINATE REDUCTION Bilateral 9/25/2020    Procedure: REDUCTION, NASAL TURBINATE;  Surgeon: Surinder Bobby MD;  Location: [facility] OR;  Service: ENT;  Laterality: Bilateral;    RHIZOTOMY W/ RADIOFREQUENCY ABLATION Bilateral     UMBILICAL HERNIA REPAIR N/A 2/21/2022    Procedure: REPAIR, HERNIA, UMBILICAL, AGE 5 YEARS OR OLDER;  Surgeon: Deshawn Mccracken III, MD;  Location: [facility] OR;  Service: General;  Laterality: N/A;       Family History   Adopted: Yes   Problem Relation Age of Onset    Breast cancer Mother     Breast cancer Maternal Grandmother        Social History     Socioeconomic History    Marital status: Legally    Tobacco Use    Smoking status: Current Every Day Smoker     Packs/day: 1.00     Years: 40.00     Pack years: 40.00     Types: Cigarettes    Smokeless tobacco: Never Used    Tobacco comment: quit cold turkey twicce for 5 years with no difficulty.   Substance and Sexual Activity    Alcohol use: No    Drug use: No    Sexual activity: Yes     Partners: Male     Birth control/protection: None   Other Topics Concern    Patient feels they ought to cut down on drinking/drug use No    Patient annoyed by others criticizing their drinking/drug use Yes    Patient has felt bad or guilty about drinking/drug use No     Patient has had a drink/used drugs as an eye opener in the AM No       Current Outpatient Medications   Medication Sig Dispense Refill    azelastine (ASTELIN) 137 mcg (0.1 %) nasal spray SPRAY ONCE IN EACH NOSTRIL TWICE DAILY 90 mL 1    fluticasone propionate (FLONASE) 50 mcg/actuation nasal spray SHAKE LIQUID AND USE 2 SPRAYS(100 MCG) IN EACH NOSTRIL EVERY DAY 48 g 1    gabapentin (NEURONTIN) 800 MG tablet Take 800 mg by mouth 3 (three) times daily.      guaifenesin/pseudoephedrne HCl (MUCINEX D ORAL) Take 1 capsule by mouth once daily.      ibuprofen (ADVIL,MOTRIN) 800 MG tablet Take 800 mg by mouth 3 (three) times daily.      levocetirizine (XYZAL) 5 MG tablet TAKE 1 TABLET(5 MG) BY MOUTH EVERY EVENING 30 tablet 3    meclizine (ANTIVERT) 25 mg tablet Take 1 tablet (25 mg total) by mouth 3 (three) times daily as needed for Dizziness. 30 tablet 0    multivitamin capsule Take 1 capsule by mouth once daily.      omeprazole (PRILOSEC) 40 MG capsule TAKE 1 CAPSULE(40 MG) BY MOUTH EVERY DAY (Patient taking differently: Take 40 mg by mouth every morning.) 90 capsule 1    ondansetron (ZOFRAN) 8 MG tablet Take 1 tablet (8 mg total) by mouth 2 (two) times daily. 15 tablet 0    oxyCODONE-acetaminophen (PERCOCET)  mg per tablet Take 1 tablet by mouth 4 (four) times daily as needed.      QUEtiapine (SEROQUEL) 50 MG tablet Take 50 mg by mouth nightly as needed.      tiZANidine (ZANAFLEX) 4 MG tablet Take 4 mg by mouth daily as needed.      (Magic mouthwash) 1:1:1 diphenhydramine(Benadryl) 12.5mg/5ml liq, aluminum & magnesium hydroxide-simethicone (Maalox), LIDOcaine viscous 2% Swish and spit 5 mLs every 4 (four) hours as needed (mouth sore). for mouth sores 120 mL 0    acetaminophen (TYLENOL) 500 MG tablet Take 1,000 mg by mouth every 6 (six) hours as needed for Pain.      amoxicillin (AMOXIL) 500 MG capsule Take 1 capsule (500 mg total) by mouth 3 (three) times daily. for 10 days 30 capsule 0     doxycycline (VIBRA-TABS) 100 MG tablet Take 1 tablet (100 mg total) by mouth 2 (two) times daily. 20 tablet 0    hydrocortisone 2.5 % lotion Apply topically 2 (two) times daily. 118 mL 1    QUEtiapine (SEROQUEL) 25 MG Tab Take 25 mg by mouth nightly as needed.       No current facility-administered medications for this visit.     Facility-Administered Medications Ordered in Other Visits   Medication Dose Route Frequency Provider Last Rate Last Admin    lactated ringers infusion   Intravenous Continuous Patricio Ceron MD 10 mL/hr at 09/25/20 0936 New Bag at 09/25/20 1031    lidocaine (PF) 10 mg/ml (1%) injection 10 mg  1 mL Intradermal Once Patricio Ceron MD           Review of patient's allergies indicates:   Allergen Reactions    Codeine Nausea And Vomiting     If taken with tylenol    Doxycycline      Makes symptoms worse    Skelaxin [metaxalone] Other (See Comments)     Burning in throat    Zoloft [sertraline] Rash       Physical examination  Vitals Reviewed\  Vitals:    07/19/22 1001   BP: 130/62   Pulse: 69   Temp: 98.8 °F (37.1 °C)     Body mass index is 21.53 kg/m². .FLOWAMB[14    Weight: 50 kg (110 lb 3.7 oz)    Gen. Well-dressed well-nourished   Skin warm dry and intact.  No rashes noted.  HEENT.  TM intact bilateral with normal light reflex.  No mastoid tenderness during percussion.  Nares patent bilateral.  Pharynx is unremarkable.  ++ maxillary or frontal sinus tenderness when percussed.    Neck is supple without adenopathy  Chest.  Respirations are even unlabored.  Lungs are clear to auscultation.  Cardiac regular rate and rhythm.  No chest wall adenopathy noted.  Neuro. Awake alert oriented x4.  Normal judgment and cognition noted.  Extremities no clubbing cyanosis or edema noted.     Call or return to clinic prn if these symptoms worsen or fail to improve as anticipated.

## 2022-09-23 ENCOUNTER — TELEPHONE (OUTPATIENT)
Dept: FAMILY MEDICINE | Facility: CLINIC | Age: 52
End: 2022-09-23
Payer: MEDICARE

## 2022-09-23 NOTE — TELEPHONE ENCOUNTER
----- Message from Giuliano Prasad sent at 9/23/2022 10:39 AM CDT -----  Type: Needs Medical Advice  Who Called:  pt  Symptoms (please be specific):  pt said she would like for the nurse to return her call her throat is red and swollen and have sore in her mouth and she wanted advise on what to do said if she need to come in she can come Monday--please call and advise  Best Call Back Number: [phone] (home)     Additional Information: thank you        
Spoke to patient via phone. Patient declined first available appt with CONNOR Garcia on Tuesday 9/27. Patient states she prefers to see  Lexx Puentes. First available with  Dhaval is 9/29. Patient scheduled for 9/29.  
no

## 2022-10-13 ENCOUNTER — TELEPHONE (OUTPATIENT)
Dept: FAMILY MEDICINE | Facility: CLINIC | Age: 52
End: 2022-10-13
Payer: MEDICARE

## 2022-10-13 RX ORDER — CIPROFLOXACIN AND DEXAMETHASONE 3; 1 MG/ML; MG/ML
4 SUSPENSION/ DROPS AURICULAR (OTIC) 2 TIMES DAILY
Qty: 7.5 ML | Refills: 0 | Status: SHIPPED | OUTPATIENT
Start: 2022-10-13 | End: 2022-10-23

## 2022-10-13 NOTE — TELEPHONE ENCOUNTER
----- Message from Trina White sent at 10/13/2022  4:26 PM CDT -----  Contact: Patient  Type: Patient Call Back         Who called: Patient          What is the request in detail: states her ear is bleeding when cleaning it; state she does not have gas money to come in for an appt and would like someone to call back         Best call back number: [phone]         Additional Information:              Thank You

## 2022-10-13 NOTE — TELEPHONE ENCOUNTER
Left ear. Patient states she used about 4 qtips. Advised patient of doctors note. Patient expressed understanding.

## 2022-11-14 ENCOUNTER — TELEPHONE (OUTPATIENT)
Dept: FAMILY MEDICINE | Facility: CLINIC | Age: 52
End: 2022-11-14
Payer: MEDICARE

## 2022-11-14 NOTE — TELEPHONE ENCOUNTER
Patient called to report symptoms (starting Saturday) of fever, runny nose, migraine, body aches, Cough, swollen glands, dizziness. Patient denies SOB. Offered patient appt for tomorrow with Bakari. Patient declined appt stating she does not have gas money to come into clinic. She asked me to send a message over to let you know what her symptoms are.  I did advise patient that you are you of the office today. Please advise

## 2022-11-14 NOTE — TELEPHONE ENCOUNTER
----- Message from Gale Amor sent at 11/14/2022  7:21 AM CST -----  Contact: Patient  Type:  Needs Medical Advice    Who Called:  Patient      Would the patient rather a call back or a response via [facility]?  Call    Best Call Back Number:  [phone] (home)     Additional Information:  Patient needs to speak to the nurse about the flu like symptoms  she is having     Please call to advise

## 2022-11-15 NOTE — TELEPHONE ENCOUNTER
"Called patient to advise per Mr Puentes    " Call pt. Sounds like the flu which has been very prevalent recently. It is too late for tamiflu. We recommend otc meds.  Claritin(loratadine), Allegra(fexofenadine), or zyrtec(cetirizine) for runny nose and congestion.   Mucinex DM daily in the morning.    Lozenge for throat pain.  Do this for no more than 7 days. If symptoms worsen seek care locally at urgent care "    No answer , left voicemail to return call.   "

## 2022-11-15 NOTE — TELEPHONE ENCOUNTER
Call pt. Sounds like the flu which has been very prevalent recently. It is too late for tamiflu. We recommend otc meds.  Claritin(loratadine), Allegra(fexofenadine), or zyrtec(cetirizine) for runny nose and congestion.   Mucinex DM daily in the morning.    Lozenge for throat pain.  Do this for no more than 7 days. If symptoms worsen seek care locally at urgent care

## 2022-12-19 ENCOUNTER — TELEPHONE (OUTPATIENT)
Dept: FAMILY MEDICINE | Facility: CLINIC | Age: 52
End: 2022-12-19
Payer: MEDICARE

## 2022-12-19 NOTE — TELEPHONE ENCOUNTER
----- Message from Joshua Pfeiffer sent at 12/19/2022  7:41 AM CST -----  Type: Needs Medical Advice  Who Called:  Pt  Symptoms (please be specific):  Fever 101.2 all weekend/ coughed mucus that was red and green this morning    How long has patient had these symptoms:  all weekend  Pharmacy name and phone #:      [id number], [place] [address] [address]  [address] [address]  Phone: [phone] Fax: [phone]        Best Call Back Number: [phone]     Additional Information: Sts wants to know what she should do.  Please advise -- Thank you

## 2022-12-19 NOTE — TELEPHONE ENCOUNTER
Called patient to advise she needs to go to the ED immediately to be assessed. No answer , left detailed voicemail to return call.

## 2022-12-19 NOTE — TELEPHONE ENCOUNTER
----- Message from Ansley Hay sent at 12/19/2022 11:03 AM CST -----  Contact: patient  Type: Needs Medical Advice  Who Called:  patient  Best Call Back Number: [phone] (home)   Additional Information: forgot to mention this morning.. she is very bloated, looks like she is pregnant, she is shaky and nausea. The other night she found some black mold in her kitchen and was scrubbing it up. Feels like someone stabbed in her ear with a tooth pick, can't sleep

## 2022-12-21 ENCOUNTER — TELEPHONE (OUTPATIENT)
Dept: FAMILY MEDICINE | Facility: CLINIC | Age: 52
End: 2022-12-21
Payer: MEDICARE

## 2022-12-21 NOTE — TELEPHONE ENCOUNTER
Returned patients call in regards to cold symptoms she is experiencing. Patient stated she has had cold symptoms x 3 days. States she has a fever on and off , cough , congestion , headache , and swollen glands. Advised patient an office visit is recommended. Stated she does not have the means to come to the clinic. Advised to go to the closest ED or urgent care. Stated she will go to one that is closer to her house. She will call once visit is completed. Verbalized understanding.

## 2022-12-21 NOTE — TELEPHONE ENCOUNTER
----- Message from Donna Freitas sent at 12/21/2022  9:28 AM CST -----  Type:  Patient Returning Call    Who Called:  pt  Who Left Message for Patient:  Abe   Does the patient know what this is regarding?:  telling the pt medical advise   Best Call Back Number:  [phone] (home)     Additional Information:  pt sts she never got a call, sts she think somebody missed dial,sts she dont have a vm or anything. Please advise thank you

## 2022-12-21 NOTE — TELEPHONE ENCOUNTER
----- Message from Vaishali Todd sent at 12/21/2022  7:48 AM CST -----  Contact: pt  Type: Needs Medical Advice    Who Called: pt  Best Call Back Number: [phone]    Inquiry/Question: pt called stating that she left a message on Monday and hasn't heard back from anyone. I did explain to her that the nurse tried to call her back. She is still running fever off and on, has congestion, runny nose       Thank you~

## 2023-01-12 ENCOUNTER — TELEPHONE (OUTPATIENT)
Dept: FAMILY MEDICINE | Facility: CLINIC | Age: 53
End: 2023-01-12
Payer: MEDICARE

## 2023-01-12 NOTE — TELEPHONE ENCOUNTER
----- Message from Elaine Rousseau sent at 1/12/2023  2:24 PM CST -----  Regarding: patient call back  Contact: patient  Name of Who is Calling: CORINNE LOPEZ [[id number]]      What is the request in detail: Patient is requesting a call back to discuss sinuses. Patient stated she has questions and concerns on which medication to take. Please advise!         Can the clinic reply by [facility]: NO        What Number to [facility] Back [facility]: [phone]

## 2023-01-13 ENCOUNTER — TELEPHONE (OUTPATIENT)
Dept: FAMILY MEDICINE | Facility: CLINIC | Age: 53
End: 2023-01-13
Payer: MEDICARE

## 2023-01-13 NOTE — TELEPHONE ENCOUNTER
pt is calling the office because was prescribed omeprazole (PRILOSEC) 40 MG capsule but she still is waking up with acid on her chin and cheeks. Please call back and advise.

## 2023-01-13 NOTE — TELEPHONE ENCOUNTER
----- Message from Jacey Chandler sent at 1/13/2023  4:03 PM CST -----  Contact: pt [phone]  Type: Needs Medical Advice  Who Called:  pt  Best Call Back Number: [phone]  Additional Information: pt is calling the office because was prescribed omeprazole (PRILOSEC) 40 MG capsule but she still is waking up with acid on her chin and cheeks. Please call back and advise.

## 2023-01-17 RX ORDER — OMEPRAZOLE 40 MG/1
40 CAPSULE, DELAYED RELEASE ORAL
Qty: 180 CAPSULE | Refills: 1 | Status: SHIPPED | OUTPATIENT
Start: 2023-01-17 | End: 2023-06-22

## 2023-01-19 ENCOUNTER — TELEPHONE (OUTPATIENT)
Dept: OTOLARYNGOLOGY | Facility: CLINIC | Age: 53
End: 2023-01-19
Payer: MEDICARE

## 2023-01-19 ENCOUNTER — TELEPHONE (OUTPATIENT)
Dept: FAMILY MEDICINE | Facility: CLINIC | Age: 53
End: 2023-01-19
Payer: MEDICARE

## 2023-01-19 NOTE — TELEPHONE ENCOUNTER
----- Message from Ramiro Funes sent at 1/19/2023 12:26 PM CST -----  Contact: Patient  Type:  Patient Call          Who Called:  patient         Does the patient know what this is regarding?: Requesting a call back about a previous surgery and to have a appt scheduled ; please advise           Would the patient rather a call back or a response via [facility]? Call           Best Call Back Number: [phone]             Additional Information:

## 2023-01-19 NOTE — TELEPHONE ENCOUNTER
----- Message from Hillary Chambers sent at 1/19/2023 12:19 PM CST -----  Contact: pt  Type:  Patient Returning Call    Who Called:  pt   Who Left Message for Patient:  darrick   Does the patient know what this is regarding?:  yes   Best Call Back Number:  [phone]    Additional Information:  pt states she is trying to return a call. Please advise.

## 2023-02-08 ENCOUNTER — TELEPHONE (OUTPATIENT)
Dept: FAMILY MEDICINE | Facility: CLINIC | Age: 53
End: 2023-02-08
Payer: MEDICARE

## 2023-02-08 NOTE — TELEPHONE ENCOUNTER
----- Message from Sukumar Beltran sent at 2/8/2023  7:45 AM CST -----  Contact: pt at [phone]  Type: Needs Medical Advice  Who Called:  pt  Best Call Back Number: [phone]  Additional Information: pt is calling the office to let the Dr know that last Friday her left eye was swollen shut. Please call back and advise.

## 2023-02-14 ENCOUNTER — TELEPHONE (OUTPATIENT)
Dept: FAMILY MEDICINE | Facility: CLINIC | Age: 53
End: 2023-02-14

## 2023-02-14 NOTE — TELEPHONE ENCOUNTER
----- Message from Holden Canchola, Patient Care Assistant sent at 2/14/2023  9:58 AM CST -----  Contact: Pt  Type:  RX Refill Request    Who Called:  Pt  Refill or New Rx:  New Rx  RX Name and Strength:  Something for allergies, Due to Not able to taste, Low Grade Fever, Mucus  How is the patient currently taking it? (ex. 1XDay):  As Directed  Is this a 30 day or 90 day RX:  30  Preferred Pharmacy with phone number:    [id number] - [place], [place] - [address] [place] [address] AT [facility] [address]  [address]  [place] [address]  Phone: [phone] Fax: [phone]  Local or Mail Order:  local  Ordering Provider:  Dhaval Romero Call Back Number:  [phone]  Additional Information:  Please contact pt upon completion-Thank you~

## 2023-02-14 NOTE — TELEPHONE ENCOUNTER
----- Message from Ansley Hay sent at 2/14/2023 10:27 AM CST -----  Contact: patient  Type: Needs Medical Advice  Who Called:  patient  Symptoms (please be specific):  nose is running, post nasal drip  How long has patient had these symptoms:  2 days  Pharmacy name and phone #:    [facility] #[id number] - [place] [address] [address]  [address] [address]  Phone: [phone] Fax: [phone]  Best Call Back Number: [phone] (home)   Additional Information: been using nasal spray and benedryl

## 2023-02-14 NOTE — TELEPHONE ENCOUNTER
Called patient to advise she can take otc medicine for her allergies.   Due to fever , patient needs to come in and be seen and tested for possible covid. No answer , unable to leave voicemail.

## 2023-02-17 ENCOUNTER — TELEPHONE (OUTPATIENT)
Dept: FAMILY MEDICINE | Facility: CLINIC | Age: 53
End: 2023-02-17
Payer: MEDICARE

## 2023-02-17 NOTE — TELEPHONE ENCOUNTER
Spoke with pt via phone. She states she was waiting on provider response in regards to her post nasal drip. She states she also has off and on fever and cough. Advised pt that she should be seen for possible covid/flu testing. Pt agreed. Advised that no available appts in clinic but [facility] is affiliated with [facility] and takes walk ins. Pt agreed, states she will go to the  in [place]. All understanding.

## 2023-02-17 NOTE — TELEPHONE ENCOUNTER
----- Message from Carlyn Fitzpatrick sent at 2/17/2023  7:58 AM CST -----  Type:  Patient Returning Call  Who Called:  Pt   Who Left Message for Patient:  Abe   Does the patient know what this is regarding?:  covid testing  Best Call Back Number:  [phone]  Additional Information:  Pt requesting a call back concerning being tested for Covid.Pt missed call from Abe.

## 2023-02-19 ENCOUNTER — OFFICE VISIT (OUTPATIENT)
Dept: URGENT CARE | Facility: CLINIC | Age: 53
End: 2023-02-19
Payer: MEDICARE

## 2023-02-19 VITALS
HEIGHT: 60 IN | RESPIRATION RATE: 16 BRPM | BODY MASS INDEX: 21.79 KG/M2 | OXYGEN SATURATION: 98 % | DIASTOLIC BLOOD PRESSURE: 69 MMHG | TEMPERATURE: 98 F | SYSTOLIC BLOOD PRESSURE: 99 MMHG | HEART RATE: 76 BPM | WEIGHT: 111 LBS

## 2023-02-19 DIAGNOSIS — Z20.822 COVID-19 VIRUS NOT DETECTED: ICD-10-CM

## 2023-02-19 DIAGNOSIS — J22 LOWER RESPIRATORY INFECTION (E.G., BRONCHITIS, PNEUMONIA, PNEUMONITIS, PULMONITIS): ICD-10-CM

## 2023-02-19 DIAGNOSIS — J32.9 SINUSITIS, UNSPECIFIED CHRONICITY, UNSPECIFIED LOCATION: ICD-10-CM

## 2023-02-19 DIAGNOSIS — R05.9 COUGH, UNSPECIFIED TYPE: Primary | ICD-10-CM

## 2023-02-19 PROCEDURE — 71046 X-RAY EXAM CHEST 2 VIEWS: CPT | Mod: S$GLB,,, | Performed by: RADIOLOGY

## 2023-02-19 PROCEDURE — 99204 PR OFFICE/OUTPT VISIT, NEW, LEVL IV, 45-59 MIN: ICD-10-PCS | Mod: S$GLB,,, | Performed by: NURSE PRACTITIONER

## 2023-02-19 PROCEDURE — 71046 XR CHEST PA AND LATERAL: ICD-10-PCS | Mod: S$GLB,,, | Performed by: RADIOLOGY

## 2023-02-19 PROCEDURE — 99204 OFFICE O/P NEW MOD 45 MIN: CPT | Mod: S$GLB,,, | Performed by: NURSE PRACTITIONER

## 2023-02-19 RX ORDER — ALBUTEROL SULFATE 90 UG/1
2 AEROSOL, METERED RESPIRATORY (INHALATION) EVERY 6 HOURS PRN
Qty: 18 G | Refills: 0 | Status: SHIPPED | OUTPATIENT
Start: 2023-02-19 | End: 2023-03-16

## 2023-02-19 RX ORDER — AMOXICILLIN AND CLAVULANATE POTASSIUM 875; 125 MG/1; MG/1
1 TABLET, FILM COATED ORAL EVERY 12 HOURS
Qty: 14 TABLET | Refills: 0 | Status: SHIPPED | OUTPATIENT
Start: 2023-02-19 | End: 2023-03-16

## 2023-02-19 RX ORDER — PROMETHAZINE HYDROCHLORIDE AND DEXTROMETHORPHAN HYDROBROMIDE 6.25; 15 MG/5ML; MG/5ML
5 SYRUP ORAL EVERY 4 HOURS PRN
Qty: 118 ML | Refills: 0 | Status: SHIPPED | OUTPATIENT
Start: 2023-02-19 | End: 2023-03-01

## 2023-02-19 RX ORDER — BENZONATATE 100 MG/1
100 CAPSULE ORAL 3 TIMES DAILY PRN
Qty: 30 CAPSULE | Refills: 0 | Status: SHIPPED | OUTPATIENT
Start: 2023-02-19 | End: 2023-03-01

## 2023-02-19 NOTE — PATIENT INSTRUCTIONS
Augmentin twice a day for 7 days.    Albuterol inhaler every 4-6 hours while awake for the next 2-3 days until symptoms are improving then just as needed.    Continue Xyzal, Astelin, Flonase already prescribed.    Increase fluid intake significantly to help thin secretions.    guaifenesin 1200 mg twice a day for 10 days over-the-counter  Phenergan cough syrup at night only to suppress cough so that you are able to rest do not use during the day.    Tessalon Perles cough pills for cough generated by postnasal drip/throat irritation  Cough and deep breathing exercises every 2-3 hours while awake to open the basis of your lungs and promote cough so that you are able to move the mucus up and out of your chest    Refrain from taking any decongestants at this point as it was just thicken mucus and increase your risk of developing pneumonia

## 2023-02-19 NOTE — PROGRESS NOTES
Subjective:       Patient ID: Alisson Eldridge is a 52 y.o. female.    Vitals:  height is 5' (1.524 m) and weight is 50.3 kg (111 lb). Her oral temperature is 98 °F (36.7 °C). Her blood pressure is 99/69 and her pulse is 76. Her respiration is 16 and oxygen saturation is 98%.     Chief Complaint: Cough    Onset of symptoms Monday    Cough  This is a new problem. The current episode started in the past 7 days. Associated symptoms include chest pain (Left chest wall), chills, ear pain (Left), a fever (None recorded, felt feverish), headaches, myalgias (Generalized), nasal congestion, postnasal drip, a sore throat, shortness of breath and wheezing. Associated symptoms comments: Sinus problem. Treatments tried: mucinex, robitussin. The treatment provided no relief.     Constitution: Positive for chills, fatigue and fever (None recorded, felt feverish).   HENT:  Positive for ear pain (Left), congestion, postnasal drip, sinus pressure (Left-sided) and sore throat. Negative for ear discharge, trouble swallowing and voice change.    Cardiovascular:  Positive for chest pain (Left chest wall).   Respiratory:  Positive for chest tightness, cough, sputum production (Thick, green), shortness of breath and wheezing.    Gastrointestinal:  Positive for vomiting (X1 earlier in the week). Negative for abdominal pain and diarrhea.   Musculoskeletal:  Positive for muscle ache (Generalized).   Neurological:  Positive for headaches.   Psychiatric/Behavioral:  Positive for sleep disturbance.      Objective:      Physical Exam   Constitutional: She is oriented to person, place, and time. She appears well-developed.  Non-toxic appearance. She does not appear ill. No distress.   HENT:   Head: Normocephalic and atraumatic.   Ears:   Right Ear: Tympanic membrane, external ear and ear canal normal.   Left Ear: impacted cerumen  Nose: Rhinorrhea and congestion present.   Mouth/Throat: Oropharynx is clear and moist. Mucous membranes are moist.  No oropharyngeal exudate.   Eyes: Conjunctivae and EOM are normal.   Cardiovascular: Normal rate, regular rhythm and normal heart sounds.   Pulmonary/Chest: Effort normal and breath sounds normal. No accessory muscle usage. No tachypnea. No respiratory distress. She has no decreased breath sounds. She has no wheezes. She has no rhonchi. She has no rales.   Coarse throughout with cough             Comments: Coarse throughout with cough    Abdominal: Normal appearance.   Musculoskeletal: Normal range of motion.         General: Normal range of motion.   Neurological: no focal deficit. She is alert and oriented to person, place, and time.   Skin: Skin is warm, dry and not diaphoretic. Capillary refill takes 2 to 3 seconds.   Psychiatric: Her behavior is normal. Mood normal.   Nursing note and vitals reviewed.      Assessment:       1. Cough, unspecified type    2. Lower respiratory infection (e.g., bronchitis, pneumonia, pneumonitis, pulmonitis)    3. Sinusitis, unspecified chronicity, unspecified location    4. COVID-19 virus not detected        Cxr:     Flu a/b neg    Plan:         Cough, unspecified type  -     X-Ray Chest PA And Lateral; Future; Expected date: 02/19/2023    Lower respiratory infection (e.g., bronchitis, pneumonia, pneumonitis, pulmonitis)  -     albuterol (VENTOLIN HFA) 90 mcg/actuation inhaler; Inhale 2 puffs into the lungs every 6 (six) hours as needed for Wheezing. Rescue  Dispense: 18 g; Refill: 0  -     amoxicillin-clavulanate 875-125mg (AUGMENTIN) 875-125 mg per tablet; Take 1 tablet by mouth every 12 (twelve) hours. for 7 days  Dispense: 14 tablet; Refill: 0  -     benzonatate (TESSALON) 100 MG capsule; Take 1 capsule (100 mg total) by mouth 3 (three) times daily as needed for Cough.  Dispense: 30 capsule; Refill: 0  -     promethazine-dextromethorphan (PROMETHAZINE-DM) 6.25-15 mg/5 mL Syrp; Take 5 mLs by mouth every 4 (four) hours as needed (cough).  Dispense: 118 mL; Refill:  0    Sinusitis, unspecified chronicity, unspecified location    COVID-19 virus not detected

## 2023-02-20 ENCOUNTER — TELEPHONE (OUTPATIENT)
Dept: FAMILY MEDICINE | Facility: CLINIC | Age: 53
End: 2023-02-20
Payer: MEDICARE

## 2023-02-20 NOTE — TELEPHONE ENCOUNTER
----- Message from Ann Marie Shahid sent at 2/20/2023  7:48 AM CST -----  Regarding: advice  Contact: patient  Type: Needs Medical Advice  Who Called:  patient  Symptoms (please be specific):  bronchitis  How long has patient had these symptoms:    Pharmacy name and phone #:    [facility][id number] - [place], [place] - [address]  [address]  [place] [address]  Phone: [phone] Fax: [phone]  Best Call Back Number: [phone] (home)   Additional Information: Patient when to ER yesterday. She states the PROMETHAZINE-DM syrup and benzonatate is not on her drug plan and would like something else for the cough. She also states she feels like she broke to ribs and the oxycodone is not working for the pain. Please call patient to advise.Thanks!

## 2023-02-22 ENCOUNTER — TELEPHONE (OUTPATIENT)
Dept: FAMILY MEDICINE | Facility: CLINIC | Age: 53
End: 2023-02-22
Payer: MEDICARE

## 2023-02-22 NOTE — TELEPHONE ENCOUNTER
----- Message from Ema Kay, Patient Care Assistant sent at 2/22/2023  1:27 PM CST -----  Regarding: advice  Contact: pt  Type: Needs Medical Advice  Who Called:  pt     Pharmacy name and phone #:    [id number] [place], [place] - [address] [place] [address] [address]  [address] [address]  Phone: [phone] Fax: [phone]    Best Call Back Number: [phone] (home)     Additional Information: pt states she would like a callback regarding unknown medications. Please call pt to advise. Thanks!

## 2023-02-22 NOTE — TELEPHONE ENCOUNTER
"Returned call to patient, someone answered the phone in english and when I asked for the patient they started speaking in O[place]i and told me "they don't speak english".    "

## 2023-02-23 NOTE — TELEPHONE ENCOUNTER
"Called patient to advise her per Mr Puentes    " She had a visit at urgent care. No cough med is covered on insurance including promethizine DM and benzonate. Promethizine DM shouldn't be excessively costly.   It is very difficult to break a rib coughing. I would not send additional pain medication. We can see her and do another xray to check for a broke rib.   She needs a follow up regardless her cxr was not completely normal. "    No answer , left voicemail to return call.   "

## 2023-03-06 ENCOUNTER — TELEPHONE (OUTPATIENT)
Dept: FAMILY MEDICINE | Facility: CLINIC | Age: 53
End: 2023-03-06

## 2023-03-10 ENCOUNTER — TELEPHONE (OUTPATIENT)
Dept: FAMILY MEDICINE | Facility: CLINIC | Age: 53
End: 2023-03-10
Payer: MEDICARE

## 2023-03-10 NOTE — TELEPHONE ENCOUNTER
----- Message from Sukumar Beltran sent at 3/10/2023  7:04 AM CST -----  Contact: pt at  [phone]  Type: Needs Medical Advice  Who Called:  pt  Best Call Back Number: [phone]  Additional Information: pt is calling the office to let the Dr know that under her breast plate is fluid and pain that started on 3/9. Please call back and advise.

## 2023-03-16 ENCOUNTER — OFFICE VISIT (OUTPATIENT)
Dept: FAMILY MEDICINE | Facility: CLINIC | Age: 53
End: 2023-03-16
Payer: MEDICARE

## 2023-03-16 ENCOUNTER — LAB VISIT (OUTPATIENT)
Dept: LAB | Facility: HOSPITAL | Age: 53
End: 2023-03-16
Attending: NURSE PRACTITIONER
Payer: MEDICARE

## 2023-03-16 VITALS
WEIGHT: 109.13 LBS | SYSTOLIC BLOOD PRESSURE: 88 MMHG | HEIGHT: 60 IN | DIASTOLIC BLOOD PRESSURE: 60 MMHG | OXYGEN SATURATION: 97 % | TEMPERATURE: 98 F | HEART RATE: 85 BPM | BODY MASS INDEX: 21.42 KG/M2

## 2023-03-16 DIAGNOSIS — J22 LOWER RESPIRATORY INFECTION (E.G., BRONCHITIS, PNEUMONIA, PNEUMONITIS, PULMONITIS): ICD-10-CM

## 2023-03-16 DIAGNOSIS — L03.113 CELLULITIS OF RIGHT ELBOW: Primary | ICD-10-CM

## 2023-03-16 LAB
ALBUMIN SERPL BCP-MCNC: 3.6 G/DL (ref 3.5–5.2)
ALP SERPL-CCNC: 108 U/L (ref 55–135)
ALT SERPL W/O P-5'-P-CCNC: 17 U/L (ref 10–44)
ANION GAP SERPL CALC-SCNC: 10 MMOL/L (ref 8–16)
AST SERPL-CCNC: 21 U/L (ref 10–40)
BILIRUB SERPL-MCNC: 0.1 MG/DL (ref 0.1–1)
BUN SERPL-MCNC: 14 MG/DL (ref 6–20)
CALCIUM SERPL-MCNC: 9 MG/DL (ref 8.7–10.5)
CHLORIDE SERPL-SCNC: 109 MMOL/L (ref 95–110)
CO2 SERPL-SCNC: 22 MMOL/L (ref 23–29)
CREAT SERPL-MCNC: 0.8 MG/DL (ref 0.5–1.4)
EST. GFR  (NO RACE VARIABLE): >60 ML/MIN/1.73 M^2
GLUCOSE SERPL-MCNC: 90 MG/DL (ref 70–110)
POTASSIUM SERPL-SCNC: 3 MMOL/L (ref 3.5–5.1)
PROT SERPL-MCNC: 6.1 G/DL (ref 6–8.4)
SODIUM SERPL-SCNC: 141 MMOL/L (ref 136–145)

## 2023-03-16 PROCEDURE — 99999 PR PBB SHADOW E&M-EST. PATIENT-LVL III: CPT | Mod: PBBFAC,,, | Performed by: NURSE PRACTITIONER

## 2023-03-16 PROCEDURE — 85025 COMPLETE CBC W/AUTO DIFF WBC: CPT | Performed by: NURSE PRACTITIONER

## 2023-03-16 PROCEDURE — 36415 COLL VENOUS BLD VENIPUNCTURE: CPT | Mod: PO | Performed by: NURSE PRACTITIONER

## 2023-03-16 PROCEDURE — 80053 COMPREHEN METABOLIC PANEL: CPT | Performed by: NURSE PRACTITIONER

## 2023-03-16 PROCEDURE — 99214 OFFICE O/P EST MOD 30 MIN: CPT | Mod: S$PBB,,, | Performed by: NURSE PRACTITIONER

## 2023-03-16 PROCEDURE — 99999 PR PBB SHADOW E&M-EST. PATIENT-LVL III: ICD-10-PCS | Mod: PBBFAC,,, | Performed by: NURSE PRACTITIONER

## 2023-03-16 PROCEDURE — 99214 PR OFFICE/OUTPT VISIT, EST, LEVL IV, 30-39 MIN: ICD-10-PCS | Mod: S$PBB,,, | Performed by: NURSE PRACTITIONER

## 2023-03-16 PROCEDURE — 99213 OFFICE O/P EST LOW 20 MIN: CPT | Mod: PBBFAC,PO | Performed by: NURSE PRACTITIONER

## 2023-03-16 RX ORDER — SULFAMETHOXAZOLE AND TRIMETHOPRIM 400; 80 MG/1; MG/1
1 TABLET ORAL 3 TIMES DAILY
Qty: 21 TABLET | Refills: 0 | Status: SHIPPED | OUTPATIENT
Start: 2023-03-16 | End: 2023-05-10 | Stop reason: ALTCHOICE

## 2023-03-16 RX ORDER — TIZANIDINE 4 MG/1
4 TABLET ORAL EVERY 6 HOURS PRN
COMMUNITY

## 2023-03-16 RX ORDER — GABAPENTIN 800 MG/1
800 TABLET ORAL 3 TIMES DAILY
COMMUNITY

## 2023-03-16 NOTE — PROGRESS NOTES
This dictation has been generated using Modal Fluency Dictation some phonetic errors may occur. Please contact author for clarification if needed.     Problem List Items Addressed This Visit    None  Visit Diagnoses       Cellulitis of right elbow    -  Primary    Lower respiratory infection (e.g., bronchitis, pneumonia, pneumonitis, pulmonitis)        Relevant Orders    CBC Auto Differential    Comprehensive Metabolic Panel            Orders Placed This Encounter    CBC Auto Differential    Comprehensive Metabolic Panel    sulfamethoxazole-trimethoprim 400-80mg (BACTRIM,SEPTRA) 400-80 mg per tablet       Cellulitis right elbow Bactrim   Lower respiratory infection CBC CMP.  Review white blood cell count and potassium.  Epigastric bloating check CMP.  Continue anti acid meds.      No follow-ups on file.    ________________________________________________________________  ________________________________________________________________      No chief complaint on file.    History of present illness  This 52 y.o. presents today for complaint of recent lower respiratory infection.  Patient notes coughing and fatigue.  No shortness of breath or chest pain.  Does note some swelling to the right elbow.  Does note some epigastric bloating.  Does note GERD symptoms.        Past Medical History:   Diagnosis Date    Abnormal uterine and vaginal bleeding, unspecified 10/29/2018    Allergy     Arthritis     Asthma 1989    Cancer 1991    cervical    CHF (congestive heart failure)     one episode, no follow up    Chronic back pain 1990    severe scoliosis    Depression     GERD (gastroesophageal reflux disease)     History of psychiatric care     History of psychiatric hospitalization     Menorrhagia with regular cycle 10/11/2018    Migraine     Primary dysmenorrhea 10/11/2018    Psychiatric exam requested by authority     Psychiatric problem     Scoliosis 1978    Therapy        Past Surgical History:   Procedure Laterality Date     APPENDECTOMY  1991    COLPOSCOPY      EPIDURAL STEROID INJECTION INTO LUMBAR SPINE      HYSTERECTOMY  2018    LAPAROSCOPIC CHOLECYSTECTOMY WITH CHOLANGIOGRAPHY N/A 3/11/2021    Procedure: CHOLECYSTECTOMY, LAPAROSCOPIC, WITH CHOLANGIOGRAM;  Surgeon: Deshawn Mccracken III, MD;  Location: [facility] OR;  Service: General;  Laterality: N/A;    LAPAROSCOPIC SALPINGO-OOPHORECTOMY  10/29/2018    Procedure: SALPINGO-OOPHORECTOMY, LAPAROSCOPIC;  Surgeon: Matthew Rene MD;  Location: [facility] OR;  Service: OB/GYN;;    LAPAROSCOPIC TOTAL HYSTERECTOMY N/A 10/29/2018    Procedure: HYSTERECTOMY, TOTAL, LAPAROSCOPIC;  Surgeon: Matthew Rene MD;  Location: [facility] OR;  Service: OB/GYN;  Laterality: N/A;    MULTIPLE TOOTH EXTRACTIONS      NASAL SEPTOPLASTY Bilateral 9/25/2020    Procedure: SEPTOPLASTY, NASAL;  Surgeon: Surinder Bobby MD;  Location: [facility] OR;  Service: ENT;  Laterality: Bilateral;    NASAL TURBINATE REDUCTION Bilateral 9/25/2020    Procedure: REDUCTION, NASAL TURBINATE;  Surgeon: Surinder Bobby MD;  Location: [facility] OR;  Service: ENT;  Laterality: Bilateral;    RHIZOTOMY W/ RADIOFREQUENCY ABLATION Bilateral     UMBILICAL HERNIA REPAIR N/A 2/21/2022    Procedure: REPAIR, HERNIA, UMBILICAL, AGE 5 YEARS OR OLDER;  Surgeon: Deshawn Mccracken III, MD;  Location: [facility] OR;  Service: General;  Laterality: N/A;       Family History   Adopted: Yes   Problem Relation Age of Onset    Breast cancer Mother     Breast cancer Maternal Grandmother        Social History     Socioeconomic History    Marital status: Legally    Tobacco Use    Smoking status: Every Day     Packs/day: 1.00     Years: 40.00     Pack years: 40.00     Types: Cigarettes    Smokeless tobacco: Never    Tobacco comments:     quit cold turkey twicce for 5 years with no difficulty.   Substance and Sexual Activity    Alcohol use: No    Drug use: No    Sexual activity: Yes     Partners: Male     Birth control/protection: None   Other Topics Concern     Patient feels they ought to cut down on drinking/drug use No    Patient annoyed by others criticizing their drinking/drug use Yes    Patient has felt bad or guilty about drinking/drug use No    Patient has had a drink/used drugs as an eye opener in the AM No       Current Outpatient Medications   Medication Sig Dispense Refill    albuterol (PROVENTIL/VENTOLIN HFA) 90 mcg/actuation inhaler INHALE 2 PUFFS INTO THE LUNGS EVERY 6 HOURS AS NEEDED FOR WHEEZING 18 g 0    azelastine (ASTELIN) 137 mcg (0.1 %) nasal spray SPRAY ONCE IN EACH NOSTRIL TWICE DAILY 90 mL 1    fluticasone propionate (FLONASE) 50 mcg/actuation nasal spray SHAKE LIQUID AND USE 2 SPRAYS(100 MCG) IN EACH NOSTRIL EVERY DAY 48 g 1    gabapentin (NEURONTIN) 800 MG tablet Take 800 mg by mouth 3 (three) times daily.      levocetirizine (XYZAL) 5 MG tablet TAKE 1 TABLET(5 MG) BY MOUTH EVERY EVENING 30 tablet 3    multivitamin capsule Take 1 capsule by mouth once daily.      omeprazole (PRILOSEC) 40 MG capsule Take 1 capsule (40 mg total) by mouth 2 (two) times daily before meals. 180 capsule 1    oxyCODONE-acetaminophen (PERCOCET)  mg per tablet Take 1 tablet by mouth 4 (four) times daily as needed.      QUEtiapine (SEROQUEL) 50 MG tablet Take 50 mg by mouth nightly as needed.      tiZANidine (ZANAFLEX) 4 MG tablet Take 4 mg by mouth every 6 (six) hours as needed.      sulfamethoxazole-trimethoprim 400-80mg (BACTRIM,SEPTRA) 400-80 mg per tablet Take 1 tablet by mouth 3 (three) times daily. 21 tablet 0     No current facility-administered medications for this visit.       Review of patient's allergies indicates:   Allergen Reactions    Codeine Nausea And Vomiting     If taken with tylenol    Doxycycline      Makes symptoms worse    Skelaxin [metaxalone] Other (See Comments)     Burning in throat    Zoloft [sertraline] Rash       Physical examination  Vitals Reviewed\  Vitals:    03/16/23 1433   BP: (!) 88/60   Pulse: 85   Temp: 98.2 °F (36.8 °C)      Body mass index is 21.31 kg/m².    Weight: 49.5 kg (109 lb 2 oz)    Gen. Well-dressed well-nourished   Skin warm dry and intact.  No rashes noted.  Neck is supple without adenopathy  Chest.  Respirations are even unlabored.  Lungs are clear to auscultation.  Cardiac regular rate and rhythm.  No chest wall adenopathy noted.  Neuro. Awake alert oriented x4.  Normal judgment and cognition noted.  Extremities no clubbing cyanosis or edema noted.     Call or return to clinic prn if these symptoms worsen or fail to improve as anticipated.

## 2023-03-17 LAB
BASOPHILS # BLD AUTO: 0.15 K/UL (ref 0–0.2)
BASOPHILS NFR BLD: 1.2 % (ref 0–1.9)
DIFFERENTIAL METHOD: ABNORMAL
EOSINOPHIL # BLD AUTO: 0.4 K/UL (ref 0–0.5)
EOSINOPHIL NFR BLD: 3.3 % (ref 0–8)
ERYTHROCYTE [DISTWIDTH] IN BLOOD BY AUTOMATED COUNT: 13.6 % (ref 11.5–14.5)
HCT VFR BLD AUTO: 31.3 % (ref 37–48.5)
HGB BLD-MCNC: 10.3 G/DL (ref 12–16)
IMM GRANULOCYTES # BLD AUTO: 0.08 K/UL (ref 0–0.04)
IMM GRANULOCYTES NFR BLD AUTO: 0.6 % (ref 0–0.5)
LYMPHOCYTES # BLD AUTO: 2.6 K/UL (ref 1–4.8)
LYMPHOCYTES NFR BLD: 20.6 % (ref 18–48)
MCH RBC QN AUTO: 32.2 PG (ref 27–31)
MCHC RBC AUTO-ENTMCNC: 32.9 G/DL (ref 32–36)
MCV RBC AUTO: 98 FL (ref 82–98)
MONOCYTES # BLD AUTO: 1.4 K/UL (ref 0.3–1)
MONOCYTES NFR BLD: 10.9 % (ref 4–15)
NEUTROPHILS # BLD AUTO: 8.1 K/UL (ref 1.8–7.7)
NEUTROPHILS NFR BLD: 63.4 % (ref 38–73)
NRBC BLD-RTO: 0 /100 WBC
PLATELET # BLD AUTO: 281 K/UL (ref 150–450)
PMV BLD AUTO: 10.4 FL (ref 9.2–12.9)
RBC # BLD AUTO: 3.2 M/UL (ref 4–5.4)
WBC # BLD AUTO: 12.69 K/UL (ref 3.9–12.7)

## 2023-03-17 RX ORDER — POTASSIUM CHLORIDE 750 MG/1
20 TABLET, EXTENDED RELEASE ORAL DAILY
Qty: 30 TABLET | Refills: 2 | Status: SHIPPED | OUTPATIENT
Start: 2023-03-17 | End: 2023-06-05

## 2023-03-23 ENCOUNTER — TELEPHONE (OUTPATIENT)
Dept: FAMILY MEDICINE | Facility: CLINIC | Age: 53
End: 2023-03-23
Payer: MEDICARE

## 2023-03-23 NOTE — TELEPHONE ENCOUNTER
----- Message from Ann Mathis sent at 3/23/2023 11:40 AM CDT -----  Regarding: return call  Contact: Patient  Type:  Patient Returning Call    Who Called:  Patient   Who Left Message for Patient:    Does the patient know what this is regarding?:   Best Call Back Number:  [phone] (home)     Additional Information:  Patient stated that she missed a call concerning her test results. Please contact patient to advise. Thanks!

## 2023-03-23 NOTE — TELEPHONE ENCOUNTER
----- Message from Tere Ball sent at 3/23/2023 11:28 AM CDT -----  Regarding: Needs return call today  Type: Needs Medical Advice  Who Called:  Alisson Romero Call Back Number: [phone]    Additional Information: Pt received a call from Abe last Friday and patient returned call on Monday with no response still, needs return call today

## 2023-04-12 ENCOUNTER — NURSE TRIAGE (OUTPATIENT)
Dept: ADMINISTRATIVE | Facility: CLINIC | Age: 53
End: 2023-04-12
Payer: MEDICARE

## 2023-04-12 DIAGNOSIS — J06.9 UPPER RESPIRATORY TRACT INFECTION, UNSPECIFIED TYPE: ICD-10-CM

## 2023-04-12 NOTE — TELEPHONE ENCOUNTER
----- Message from Lexx Miller sent at 4/12/2023 12:38 PM CDT -----  Contact: Self  Type:  RX Refill Request    Who Called:  Patient  Refill or New Rx:  Refill  RX Name and Strength:  azelastine (ASTELIN) 137 mcg (0.1 %) nasal spray  How is the patient currently taking it? (ex. 1XDay):  as directed  Is this a 30 day or 90 day RX:  90  Preferred Pharmacy with phone number:    [facility] #[id number] - [place] [address] [facility] [address]  [address] [address]  Phone: [phone] Fax: [phone]      Local or Mail Order:  Local   Ordering Provider:   Dhaval Romero Call Back Number:  [phone]   Additional Information:

## 2023-04-12 NOTE — TELEPHONE ENCOUNTER
Pt calling states that her blood pressure has been fluctuating and she is dizzy especially with standing. Reports readings of: 79/53, 109/67 and 80/53. Current b/p while on the line is 107/68. Reports she normally runs lower but is symptomatic currently. Per protocol advised to ED/[facility] NOW. verbalized understanding. Denies any further questions or concerns at this time, advised to call back if they have any that come up. Advised pt to call back with any other concerns or worsening symptoms. Verbalized understanding and will route message to provider.     Pt is also requesting refill on flonase!    Reason for Disposition   Patient sounds very sick or weak to the triager    Additional Information   Negative: Systolic BP < 90 and feeling dizzy, lightheaded, or weak   Negative: Started suddenly after an allergic medicine, an allergic food, or bee sting   Negative: Shock suspected (e.g., cold/pale/clammy skin, too weak to stand, low BP, rapid pulse)   Negative: Difficult to awaken or acting confused (e.g., disoriented, slurred speech)   Negative: Fainted   Negative: Chest pain   Negative: Bleeding (e.g., vomiting blood, rectal bleeding or tarry stools, severe vaginal bleeding)   Negative: Extra heart beats or heart is beating fast (i.e., 'palpitations')   Negative: Sounds like a life-threatening emergency to the triager   Negative: Systolic BP < 80 and NOT dizzy, lightheaded or weak (feels normal)     107/68   Negative: Abdominal pain   Negative: Major surgery in the past month   Negative: Fever > 100.4 F (38.0 C)   Negative: Drinking very little and has signs of dehydration (e.g., no urine > 12 hours, very dry mouth, very lightheaded)   Negative: Fall in systolic BP > 20 mm Hg from normal and feeling dizzy, lightheaded, or weak    Protocols used: Blood Pressure - Low-A-OH

## 2023-04-13 RX ORDER — AZELASTINE 1 MG/ML
SPRAY, METERED NASAL
Qty: 90 ML | Refills: 1 | Status: SHIPPED | OUTPATIENT
Start: 2023-04-13 | End: 2023-12-06

## 2023-04-13 NOTE — TELEPHONE ENCOUNTER
----- Message from Annetta Mclean sent at 4/13/2023 11:07 AM CDT -----  Regarding: Patient Returning Call/refill request/advice  Contact: patient at [phone]  Type:  Patient Returning Call    Who Called:  Patient  Who Left Message for Patient:  unsure  Does the patient know what this is regarding?:  refill  Best Call Back Number:  [phone]    Additional Information:  Patient would like to get a refill of the medication below and to discuss mucous she is coughing up. Please call and advise. Thank you    azelastine (ASTELIN) 137 mcg (0.1 %) nasal spray 90 mL 1 4/11/2022    Sig: SPRAY ONCE IN EACH NOSTRIL TWICE DAILY   Sent to pharmacy as: azelastine (ASTELIN) 137 mcg (0.1 %) nasal spray   Notes to Pharmacy: ##Patient requests 90 days supply##   E-Prescribing Status: Receipt confirmed by pharmacy (4/11/2022  5:04 PM CDT)

## 2023-04-13 NOTE — TELEPHONE ENCOUNTER
Called patient to advise when her labs are finalized Mr Puentes will review and determine if an appointment is needed. No answer , unable to leave voicemail.

## 2023-04-14 NOTE — TELEPHONE ENCOUNTER
Spoke to patient via phone. She states she is no longer having low blood pressure. Advised patient to call back if we can assist any further.

## 2023-04-27 ENCOUNTER — TELEPHONE (OUTPATIENT)
Dept: FAMILY MEDICINE | Facility: CLINIC | Age: 53
End: 2023-04-27
Payer: MEDICARE

## 2023-04-27 NOTE — TELEPHONE ENCOUNTER
Patient advised she has been dizzy for several days, advised she needs to come in for an appointment to be evaluated. Offered patient appointment for tomorrow, patient declined and wanted appointment for Tuesday. Appointment given.

## 2023-04-27 NOTE — TELEPHONE ENCOUNTER
----- Message from Monserrat Curry sent at 4/27/2023  3:17 PM CDT -----  Contact: patient  Type:  Needs Medical Advice    Who Called: patient    Symptoms (please be specific): dizzy      How long has patient had these symptoms:   several days     Pharmacy name and phone #:      Would the patient rather a call back or a response via [facility]? Call     Best Call Back Number: [phone] (home)      Additional Information: Patient would like to speak with the nurse in regards to why she is dizzy.     Please call to advise

## 2023-05-02 ENCOUNTER — TELEPHONE (OUTPATIENT)
Dept: FAMILY MEDICINE | Facility: CLINIC | Age: 53
End: 2023-05-02
Payer: MEDICARE

## 2023-05-02 NOTE — TELEPHONE ENCOUNTER
----- Message from Hillary Chambers sent at 5/2/2023 12:07 PM CDT -----  Contact: pt  Type:  Sooner Appointment Request    Caller is requesting a sooner appointment.  Caller declined first available appointment listed below.  Caller will not accept being placed on the waitlist and is requesting a message be sent to doctor.    Name of Caller:  pt   When is the first available appointment?  07/2023  Symptoms:  fu/ awv / dizzy   Best Call Back Number:  [phone]    Additional Information:  pt states she would like a sooner appt she states she accidentally fell back asleep this morning for appt. Please advise.

## 2023-05-04 ENCOUNTER — TELEPHONE (OUTPATIENT)
Dept: FAMILY MEDICINE | Facility: CLINIC | Age: 53
End: 2023-05-04
Payer: MEDICARE

## 2023-05-04 NOTE — TELEPHONE ENCOUNTER
----- Message from Ema Kay, Patient Care Assistant sent at 5/3/2023  1:30 PM CDT -----  Regarding: returning call  Contact: pt  Type:  Patient Returning Call    Who Called:  pt     Who Left Message for Patient:  DOUGLASDELLARADHA    Does the patient know what this is regarding?:  yes     Best Call Back Number:  [phone] (home)     Additional Information:  please call pt to advise. Thanks!

## 2023-05-05 RX ORDER — FLUTICASONE PROPIONATE 50 MCG
SPRAY, SUSPENSION (ML) NASAL
Qty: 48 G | Refills: 1 | Status: SHIPPED | OUTPATIENT
Start: 2023-05-05 | End: 2023-12-06

## 2023-05-05 NOTE — TELEPHONE ENCOUNTER
Refill Routing Note   Medication(s) are not appropriate for processing by [facility] for the following reason(s):      Non-participating provider    [facility] action(s):  Route              Appointments  past 12m or future 3m with PCP    Date Provider   Last Visit   3/16/2023 Lexx Puentes NP   Next Visit   5/10/2023 Lexx Puentes NP   ED visits in past 90 days: 0        Note composed:8:40 AM 05/05/2023

## 2023-05-10 ENCOUNTER — LAB VISIT (OUTPATIENT)
Dept: LAB | Facility: HOSPITAL | Age: 53
End: 2023-05-10
Attending: NURSE PRACTITIONER
Payer: MEDICARE

## 2023-05-10 ENCOUNTER — OFFICE VISIT (OUTPATIENT)
Dept: FAMILY MEDICINE | Facility: CLINIC | Age: 53
End: 2023-05-10
Payer: MEDICARE

## 2023-05-10 VITALS
BODY MASS INDEX: 20.91 KG/M2 | SYSTOLIC BLOOD PRESSURE: 86 MMHG | HEIGHT: 60 IN | TEMPERATURE: 98 F | HEART RATE: 61 BPM | DIASTOLIC BLOOD PRESSURE: 60 MMHG | OXYGEN SATURATION: 95 % | WEIGHT: 106.5 LBS

## 2023-05-10 DIAGNOSIS — I95.9 HYPOTENSIVE EPISODE: Primary | ICD-10-CM

## 2023-05-10 DIAGNOSIS — R43.9 PROBLEMS WITH SMELL AND TASTE: ICD-10-CM

## 2023-05-10 DIAGNOSIS — I95.9 HYPOTENSIVE EPISODE: ICD-10-CM

## 2023-05-10 LAB
BASOPHILS # BLD AUTO: 0.09 K/UL (ref 0–0.2)
BASOPHILS NFR BLD: 0.6 % (ref 0–1.9)
DIFFERENTIAL METHOD: ABNORMAL
EOSINOPHIL # BLD AUTO: 0.3 K/UL (ref 0–0.5)
EOSINOPHIL NFR BLD: 2.1 % (ref 0–8)
ERYTHROCYTE [DISTWIDTH] IN BLOOD BY AUTOMATED COUNT: 13.2 % (ref 11.5–14.5)
HCT VFR BLD AUTO: 33.8 % (ref 37–48.5)
HGB BLD-MCNC: 10.2 G/DL (ref 12–16)
IMM GRANULOCYTES # BLD AUTO: 0.07 K/UL (ref 0–0.04)
IMM GRANULOCYTES NFR BLD AUTO: 0.5 % (ref 0–0.5)
LYMPHOCYTES # BLD AUTO: 2.7 K/UL (ref 1–4.8)
LYMPHOCYTES NFR BLD: 19.2 % (ref 18–48)
MCH RBC QN AUTO: 31.6 PG (ref 27–31)
MCHC RBC AUTO-ENTMCNC: 30.2 G/DL (ref 32–36)
MCV RBC AUTO: 105 FL (ref 82–98)
MONOCYTES # BLD AUTO: 1.2 K/UL (ref 0.3–1)
MONOCYTES NFR BLD: 8.2 % (ref 4–15)
NEUTROPHILS # BLD AUTO: 9.7 K/UL (ref 1.8–7.7)
NEUTROPHILS NFR BLD: 69.4 % (ref 38–73)
NRBC BLD-RTO: 0 /100 WBC
PLATELET # BLD AUTO: 311 K/UL (ref 150–450)
PMV BLD AUTO: 10.4 FL (ref 9.2–12.9)
RBC # BLD AUTO: 3.23 M/UL (ref 4–5.4)
WBC # BLD AUTO: 13.96 K/UL (ref 3.9–12.7)

## 2023-05-10 PROCEDURE — 80053 COMPREHEN METABOLIC PANEL: CPT | Performed by: NURSE PRACTITIONER

## 2023-05-10 PROCEDURE — 93010 ELECTROCARDIOGRAM REPORT: CPT | Mod: S$PBB,,, | Performed by: INTERNAL MEDICINE

## 2023-05-10 PROCEDURE — 99214 PR OFFICE/OUTPT VISIT, EST, LEVL IV, 30-39 MIN: ICD-10-PCS | Mod: S$PBB,,, | Performed by: NURSE PRACTITIONER

## 2023-05-10 PROCEDURE — 99214 OFFICE O/P EST MOD 30 MIN: CPT | Mod: PBBFAC,PO | Performed by: NURSE PRACTITIONER

## 2023-05-10 PROCEDURE — 93010 EKG 12-LEAD: ICD-10-PCS | Mod: S$PBB,,, | Performed by: INTERNAL MEDICINE

## 2023-05-10 PROCEDURE — 99214 OFFICE O/P EST MOD 30 MIN: CPT | Mod: S$PBB,,, | Performed by: NURSE PRACTITIONER

## 2023-05-10 PROCEDURE — 36415 COLL VENOUS BLD VENIPUNCTURE: CPT | Mod: PO | Performed by: NURSE PRACTITIONER

## 2023-05-10 PROCEDURE — 93005 ELECTROCARDIOGRAM TRACING: CPT | Mod: PBBFAC,PO | Performed by: INTERNAL MEDICINE

## 2023-05-10 PROCEDURE — U0005 INFEC AGEN DETEC AMPLI PROBE: HCPCS | Performed by: NURSE PRACTITIONER

## 2023-05-10 PROCEDURE — 99999 PR PBB SHADOW E&M-EST. PATIENT-LVL IV: CPT | Mod: PBBFAC,,, | Performed by: NURSE PRACTITIONER

## 2023-05-10 PROCEDURE — 85025 COMPLETE CBC W/AUTO DIFF WBC: CPT | Performed by: NURSE PRACTITIONER

## 2023-05-10 PROCEDURE — 99999 PR PBB SHADOW E&M-EST. PATIENT-LVL IV: ICD-10-PCS | Mod: PBBFAC,,, | Performed by: NURSE PRACTITIONER

## 2023-05-10 NOTE — PROGRESS NOTES
This dictation has been generated using Modal Fluency Dictation some phonetic errors may occur. Please contact author for clarification if needed.     Problem List Items Addressed This Visit    None  Visit Diagnoses       Hypotensive episode    -  Primary    Relevant Orders    CBC Auto Differential    Comprehensive Metabolic Panel    IN OFFICE EKG 12-LEAD (to [place])    RPR    Echo    Problems with smell and taste        Relevant Orders    COVID-19 Routine Screening            Orders Placed This Encounter    CBC Auto Differential    Comprehensive Metabolic Panel    RPR    COVID-19 Routine Screening    IN OFFICE EKG 12-LEAD (to [place])    Echo       Hypertensive episode.  Labs as above rule out anemia metabolic reasons.  Check EKG rule out heart disease.  EKG normal sinus rhythm per my interpretation some axis change noted.  Proceed with stress testing.  Check echo prior chest x-ray notes some borderline heart enlargement.  Also evaluate her EF.  Though doubtful rule out RPR also has some neuropathy.  Recommend over-the-counter therapies for sinus symptoms.  Preop clearance deferred until workup complete.  Follow up in about 3 weeks (around 5/31/2023).    ________________________________________________________________  ________________________________________________________________      Chief Complaint   Patient presents with    Pre-op Exam     History of present illness  This 52 y.o. presents today for complaint of sinus issues and preop clearance..  Low blood pressure noted at visit.  Last couple of blood pressures have trended lower.  Blood pressure at visit 72/48.  Patient denies chest pain or shortness of breath.  Occasionally she is dizzy.  She has not passed out.  She does note the back pain.  They do plan to do some back injections and she needed a preop clearance.  Discussed completing workup before preop clearance and she will need a follow-up appointment.    Past Medical History:   Diagnosis Date     Abnormal uterine and vaginal bleeding, unspecified 10/29/2018    Allergy     Arthritis     Asthma 1989    Cancer 1991    cervical    CHF (congestive heart failure)     one episode, no follow up    Chronic back pain 1990    severe scoliosis    Depression     GERD (gastroesophageal reflux disease)     History of psychiatric care     History of psychiatric hospitalization     Menorrhagia with regular cycle 10/11/2018    Migraine     Primary dysmenorrhea 10/11/2018    Psychiatric exam requested by authority     Psychiatric problem     Scoliosis 1978    Therapy        Past Surgical History:   Procedure Laterality Date    APPENDECTOMY  1991    COLPOSCOPY      EPIDURAL STEROID INJECTION INTO LUMBAR SPINE      HYSTERECTOMY  2018    LAPAROSCOPIC CHOLECYSTECTOMY WITH CHOLANGIOGRAPHY N/A 3/11/2021    Procedure: CHOLECYSTECTOMY, LAPAROSCOPIC, WITH CHOLANGIOGRAM;  Surgeon: Deshawn Mccracken III, MD;  Location: [facility] OR;  Service: General;  Laterality: N/A;    LAPAROSCOPIC SALPINGO-OOPHORECTOMY  10/29/2018    Procedure: SALPINGO-OOPHORECTOMY, LAPAROSCOPIC;  Surgeon: Matthew Rene MD;  Location: [facility] OR;  Service: OB/GYN;;    LAPAROSCOPIC TOTAL HYSTERECTOMY N/A 10/29/2018    Procedure: HYSTERECTOMY, TOTAL, LAPAROSCOPIC;  Surgeon: Matthew Rene MD;  Location: [facility] OR;  Service: OB/GYN;  Laterality: N/A;    MULTIPLE TOOTH EXTRACTIONS      NASAL SEPTOPLASTY Bilateral 9/25/2020    Procedure: SEPTOPLASTY, NASAL;  Surgeon: Surinder Bobby MD;  Location: [facility] OR;  Service: ENT;  Laterality: Bilateral;    NASAL TURBINATE REDUCTION Bilateral 9/25/2020    Procedure: REDUCTION, NASAL TURBINATE;  Surgeon: Surinder Bobby MD;  Location: [facility] OR;  Service: ENT;  Laterality: Bilateral;    RHIZOTOMY W/ RADIOFREQUENCY ABLATION Bilateral     UMBILICAL HERNIA REPAIR N/A 2/21/2022    Procedure: REPAIR, HERNIA, UMBILICAL, AGE 5 YEARS OR OLDER;  Surgeon: Deshawn Mccracken III, MD;  Location: [facility] OR;  Service: General;  Laterality: N/A;        Family History   Adopted: Yes   Problem Relation Age of Onset    Breast cancer Mother     Breast cancer Maternal Grandmother        Social History     Socioeconomic History    Marital status: Legally    Tobacco Use    Smoking status: Every Day     Packs/day: 1.00     Years: 40.00     Pack years: 40.00     Types: Cigarettes    Smokeless tobacco: Never    Tobacco comments:     quit cold turkey twicce for 5 years with no difficulty.   Substance and Sexual Activity    Alcohol use: No    Drug use: No    Sexual activity: Yes     Partners: Male     Birth control/protection: None   Other Topics Concern    Patient feels they ought to cut down on drinking/drug use No    Patient annoyed by others criticizing their drinking/drug use Yes    Patient has felt bad or guilty about drinking/drug use No    Patient has had a drink/used drugs as an eye opener in the AM No       Current Outpatient Medications   Medication Sig Dispense Refill    albuterol (PROVENTIL/VENTOLIN HFA) 90 mcg/actuation inhaler INHALE 2 PUFFS INTO THE LUNGS EVERY 6 HOURS AS NEEDED FOR WHEEZING 24 g 0    azelastine (ASTELIN) 137 mcg (0.1 %) nasal spray SPRAY ONCE IN EACH NOSTRIL TWICE DAILY 90 mL 1    fluticasone propionate (FLONASE) 50 mcg/actuation nasal spray SHAKE LIQUID AND USE 2 SPRAYS(100 MCG) IN EACH NOSTRIL EVERY DAY 48 g 1    gabapentin (NEURONTIN) 800 MG tablet Take 800 mg by mouth 3 (three) times daily.      levocetirizine (XYZAL) 5 MG tablet TAKE 1 TABLET(5 MG) BY MOUTH EVERY EVENING 30 tablet 3    multivitamin capsule Take 1 capsule by mouth once daily.      omeprazole (PRILOSEC) 40 MG capsule Take 1 capsule (40 mg total) by mouth 2 (two) times daily before meals. 180 capsule 1    oxyCODONE-acetaminophen (PERCOCET)  mg per tablet Take 1 tablet by mouth 4 (four) times daily as needed.      potassium chloride SA (K-DUR,KLOR-CON M) 10 MEQ tablet Take 2 tablets (20 mEq total) by mouth once daily. 30 tablet 2    QUEtiapine (SEROQUEL)  50 MG tablet Take 50 mg by mouth nightly as needed.      tiZANidine (ZANAFLEX) 4 MG tablet Take 4 mg by mouth every 6 (six) hours as needed.       No current facility-administered medications for this visit.       Review of patient's allergies indicates:   Allergen Reactions    Codeine Nausea And Vomiting     If taken with tylenol    Doxycycline      Makes symptoms worse    Skelaxin [metaxalone] Other (See Comments)     Burning in throat    Zoloft [sertraline] Rash       Physical examination  Vitals Reviewed\  Vitals:    05/10/23 1051   BP: (!) 86/60   Pulse: 61   Temp:      Body mass index is 20.8 kg/m².   Gen. Well-dressed well-nourished   Skin warm dry and intact.  No rashes noted.  HEENT.  TM intact bilateral with normal light reflex.  No mastoid tenderness during percussion.  Nares patent bilateral.  Pharynx is unremarkable.  No maxillary or frontal sinus tenderness when percussed.    Neck is supple without adenopathy  Chest.  Respirations are even unlabored.  Lungs are clear to auscultation.  Cardiac regular rate and rhythm.  No chest wall adenopathy noted.  Neuro. Awake alert oriented x4.  Normal judgment and cognition noted.  Extremities no clubbing cyanosis or edema noted.     Call or return to clinic prn if these symptoms worsen or fail to improve as anticipated.

## 2023-05-11 LAB
ALBUMIN SERPL BCP-MCNC: 3.6 G/DL (ref 3.5–5.2)
ALP SERPL-CCNC: 91 U/L (ref 55–135)
ALT SERPL W/O P-5'-P-CCNC: 9 U/L (ref 10–44)
ANION GAP SERPL CALC-SCNC: 9 MMOL/L (ref 8–16)
AST SERPL-CCNC: 15 U/L (ref 10–40)
BILIRUB SERPL-MCNC: 0.1 MG/DL (ref 0.1–1)
BUN SERPL-MCNC: 12 MG/DL (ref 6–20)
CALCIUM SERPL-MCNC: 9 MG/DL (ref 8.7–10.5)
CHLORIDE SERPL-SCNC: 112 MMOL/L (ref 95–110)
CO2 SERPL-SCNC: 20 MMOL/L (ref 23–29)
CREAT SERPL-MCNC: 0.9 MG/DL (ref 0.5–1.4)
EST. GFR  (NO RACE VARIABLE): >60 ML/MIN/1.73 M^2
GLUCOSE SERPL-MCNC: 88 MG/DL (ref 70–110)
POTASSIUM SERPL-SCNC: 3.7 MMOL/L (ref 3.5–5.1)
PROT SERPL-MCNC: 5.8 G/DL (ref 6–8.4)
SARS-COV-2 RNA RESP QL NAA+PROBE: NOT DETECTED
SODIUM SERPL-SCNC: 141 MMOL/L (ref 136–145)

## 2023-05-12 ENCOUNTER — CLINICAL SUPPORT (OUTPATIENT)
Dept: CARDIOLOGY | Facility: HOSPITAL | Age: 53
End: 2023-05-12
Attending: NURSE PRACTITIONER
Payer: MEDICARE

## 2023-05-12 VITALS — BODY MASS INDEX: 20.81 KG/M2 | HEIGHT: 60 IN | WEIGHT: 106 LBS

## 2023-05-12 DIAGNOSIS — I95.9 HYPOTENSIVE EPISODE: ICD-10-CM

## 2023-05-12 LAB
AORTIC ROOT ANNULUS: 2.2 CM
AORTIC VALVE CUSP SEPERATION: 1.8 CM
ASCENDING AORTA: 2.7 CM
AV INDEX (PROSTH): 0.82
AV MEAN GRADIENT: 4 MMHG
AV PEAK GRADIENT: 7 MMHG
AV VALVE AREA: 2.34 CM2
AV VELOCITY RATIO: 0.85
BSA FOR ECHO PROCEDURE: 1.43 M2
CV ECHO LV RWT: 0.34 CM
DOP CALC AO PEAK VEL: 1.35 M/S
DOP CALC AO VTI: 29.7 CM
DOP CALC LVOT AREA: 2.8 CM2
DOP CALC LVOT DIAMETER: 1.9 CM
DOP CALC LVOT PEAK VEL: 1.15 M/S
DOP CALC LVOT STROKE VOLUME: 69.43 CM3
DOP CALC MV VTI: 29 CM
DOP CALCLVOT PEAK VEL VTI: 24.5 CM
E WAVE DECELERATION TIME: 221 MSEC
E/A RATIO: 1.26
E/E' RATIO: 7.11 M/S
ECHO LV POSTERIOR WALL: 0.7 CM (ref 0.6–1.1)
EJECTION FRACTION: 60 %
FRACTIONAL SHORTENING: 33 % (ref 28–44)
INTERVENTRICULAR SEPTUM: 0.78 CM (ref 0.6–1.1)
IVC DIAMETER: 1.37 CM
LEFT INTERNAL DIMENSION IN SYSTOLE: 2.74 CM (ref 2.1–4)
LEFT VENTRICLE DIASTOLIC VOLUME INDEX: 51.61 ML/M2
LEFT VENTRICLE DIASTOLIC VOLUME: 73.8 ML
LEFT VENTRICLE MASS INDEX: 61 G/M2
LEFT VENTRICLE SYSTOLIC VOLUME INDEX: 19.6 ML/M2
LEFT VENTRICLE SYSTOLIC VOLUME: 28 ML
LEFT VENTRICULAR INTERNAL DIMENSION IN DIASTOLE: 4.09 CM (ref 3.5–6)
LEFT VENTRICULAR MASS: 87.45 G
LV LATERAL E/E' RATIO: 6.4 M/S
LV SEPTAL E/E' RATIO: 8 M/S
LVOT MG: 3 MMHG
LVOT MV: 0.74 CM/S
MV MEAN GRADIENT: 2 MMHG
MV PEAK A VEL: 0.76 M/S
MV PEAK E VEL: 0.96 M/S
MV PEAK GRADIENT: 5 MMHG
MV STENOSIS PRESSURE HALF TIME: 87 MS
MV VALVE AREA BY CONTINUITY EQUATION: 2.39 CM2
MV VALVE AREA P 1/2 METHOD: 2.53 CM2
OHS LV EJECTION FRACTION SIMPSONS BIPLANE MOD: 6 %
PISA TR MAX VEL: 2.23 M/S
PV MV: 0.85 M/S
PV PEAK VELOCITY: 1.24 CM/S
RA PRESSURE: 3 MMHG
RV TISSUE DOPPLER FREE WALL SYSTOLIC VELOCITY 1 (APICAL 4 CHAMBER VIEW): 0.01 CM/S
SINUS: 2.48 CM
STJ: 1.84 CM
TDI LATERAL: 0.15 M/S
TDI SEPTAL: 0.12 M/S
TDI: 0.14 M/S
TR MAX PG: 20 MMHG
TRICUSPID ANNULAR PLANE SYSTOLIC EXCURSION: 2.11 CM
TV REST PULMONARY ARTERY PRESSURE: 23 MMHG

## 2023-05-12 PROCEDURE — 93306 ECHO (CUPID ONLY): ICD-10-PCS | Mod: 26,,, | Performed by: GENERAL PRACTICE

## 2023-05-12 PROCEDURE — 93306 TTE W/DOPPLER COMPLETE: CPT | Mod: 26,,, | Performed by: GENERAL PRACTICE

## 2023-05-12 PROCEDURE — 93306 TTE W/DOPPLER COMPLETE: CPT

## 2023-05-30 ENCOUNTER — OFFICE VISIT (OUTPATIENT)
Dept: FAMILY MEDICINE | Facility: CLINIC | Age: 53
End: 2023-05-30
Payer: MEDICARE

## 2023-05-30 VITALS
HEART RATE: 94 BPM | SYSTOLIC BLOOD PRESSURE: 94 MMHG | DIASTOLIC BLOOD PRESSURE: 60 MMHG | HEIGHT: 60 IN | OXYGEN SATURATION: 97 % | BODY MASS INDEX: 19.95 KG/M2 | WEIGHT: 101.63 LBS

## 2023-05-30 DIAGNOSIS — R94.31 ABNORMAL EKG: ICD-10-CM

## 2023-05-30 DIAGNOSIS — R05.9 COUGH, UNSPECIFIED TYPE: ICD-10-CM

## 2023-05-30 DIAGNOSIS — I95.9 HYPOTENSIVE EPISODE: Primary | ICD-10-CM

## 2023-05-30 PROCEDURE — 99213 OFFICE O/P EST LOW 20 MIN: CPT | Mod: S$PBB,,, | Performed by: NURSE PRACTITIONER

## 2023-05-30 PROCEDURE — 99999 PR PBB SHADOW E&M-EST. PATIENT-LVL III: CPT | Mod: PBBFAC,,, | Performed by: NURSE PRACTITIONER

## 2023-05-30 PROCEDURE — 99999 PR PBB SHADOW E&M-EST. PATIENT-LVL III: ICD-10-PCS | Mod: PBBFAC,,, | Performed by: NURSE PRACTITIONER

## 2023-05-30 PROCEDURE — 99213 OFFICE O/P EST LOW 20 MIN: CPT | Mod: PBBFAC,PO | Performed by: NURSE PRACTITIONER

## 2023-05-30 PROCEDURE — 99213 PR OFFICE/OUTPT VISIT, EST, LEVL III, 20-29 MIN: ICD-10-PCS | Mod: S$PBB,,, | Performed by: NURSE PRACTITIONER

## 2023-05-30 RX ORDER — IBUPROFEN 800 MG/1
800 TABLET ORAL
COMMUNITY
Start: 2023-05-25

## 2023-05-30 NOTE — PROGRESS NOTES
This dictation has been generated using Modal Fluency Dictation some phonetic errors may occur. Please contact author for clarification if needed.     Problem List Items Addressed This Visit    None  Visit Diagnoses       Hypotensive episode    -  Primary    Cough, unspecified type        Abnormal EKG                         Hypertensive episode.  No marked anemia or metabolic reasons.   EKG normal sinus rhythm per my interpretation some axis change noted.  Echo acceptable.   Recommend over-the-counter therapies for sinus symptoms.  Preop clearance deferred until workup complete. OK to clear for back injections.   Rhinorrhea ok antihistamines, flonase, saline rinses.   No follow-ups on file.    ________________________________________________________________  ________________________________________________________________      Chief Complaint   Patient presents with    Follow-up     History of present illness  This 52 y.o. presents today for complaint of sinus issues and preop clearance..  Low blood pressure noted at visit.  Last couple of blood pressures have trended lower.  Blood pressure at visit 72/48.  Patient denies chest pain or shortness of breath.  Occasionally she is dizzy.  She has not passed out.  She does note the back pain.  They do plan to do some back injections and she needed a preop clearance.  Discussed completing workup before preop clearance and she will need a follow-up appointment.    Past Medical History:   Diagnosis Date    Abnormal uterine and vaginal bleeding, unspecified 10/29/2018    Allergy     Arthritis     Asthma 1989    Cancer 1991    cervical    CHF (congestive heart failure)     one episode, no follow up    Chronic back pain 1990    severe scoliosis    Depression     GERD (gastroesophageal reflux disease)     History of psychiatric care     History of psychiatric hospitalization     Menorrhagia with regular cycle 10/11/2018    Migraine     Primary dysmenorrhea 10/11/2018     Psychiatric exam requested by authority     Psychiatric problem     Scoliosis 1978    Therapy        Past Surgical History:   Procedure Laterality Date    APPENDECTOMY  1991    COLPOSCOPY      EPIDURAL STEROID INJECTION INTO LUMBAR SPINE      HYSTERECTOMY  2018    LAPAROSCOPIC CHOLECYSTECTOMY WITH CHOLANGIOGRAPHY N/A 3/11/2021    Procedure: CHOLECYSTECTOMY, LAPAROSCOPIC, WITH CHOLANGIOGRAM;  Surgeon: Deshawn Mccracken III, MD;  Location: [facility] OR;  Service: General;  Laterality: N/A;    LAPAROSCOPIC SALPINGO-OOPHORECTOMY  10/29/2018    Procedure: SALPINGO-OOPHORECTOMY, LAPAROSCOPIC;  Surgeon: Matthew Rene MD;  Location: [facility] OR;  Service: OB/GYN;;    LAPAROSCOPIC TOTAL HYSTERECTOMY N/A 10/29/2018    Procedure: HYSTERECTOMY, TOTAL, LAPAROSCOPIC;  Surgeon: Matthew Rene MD;  Location: [facility] OR;  Service: OB/GYN;  Laterality: N/A;    MULTIPLE TOOTH EXTRACTIONS      NASAL SEPTOPLASTY Bilateral 9/25/2020    Procedure: SEPTOPLASTY, NASAL;  Surgeon: Surinder Bobby MD;  Location: [facility] OR;  Service: ENT;  Laterality: Bilateral;    NASAL TURBINATE REDUCTION Bilateral 9/25/2020    Procedure: REDUCTION, NASAL TURBINATE;  Surgeon: Surinder Bobby MD;  Location: [facility] OR;  Service: ENT;  Laterality: Bilateral;    RHIZOTOMY W/ RADIOFREQUENCY ABLATION Bilateral     UMBILICAL HERNIA REPAIR N/A 2/21/2022    Procedure: REPAIR, HERNIA, UMBILICAL, AGE 5 YEARS OR OLDER;  Surgeon: Deshawn Mccracken III, MD;  Location: [facility] OR;  Service: General;  Laterality: N/A;       Family History   Adopted: Yes   Problem Relation Age of Onset    Breast cancer Mother     Breast cancer Maternal Grandmother        Social History     Socioeconomic History    Marital status: Legally    Tobacco Use    Smoking status: Every Day     Packs/day: 1.00     Years: 40.00     Pack years: 40.00     Types: Cigarettes    Smokeless tobacco: Never    Tobacco comments:     quit cold turkey twicce for 5 years with no difficulty.   Substance and Sexual  Activity    Alcohol use: No    Drug use: No    Sexual activity: Yes     Partners: Male     Birth control/protection: None   Other Topics Concern    Patient feels they ought to cut down on drinking/drug use No    Patient annoyed by others criticizing their drinking/drug use Yes    Patient has felt bad or guilty about drinking/drug use No    Patient has had a drink/used drugs as an eye opener in the AM No       Current Outpatient Medications   Medication Sig Dispense Refill    albuterol (PROVENTIL/VENTOLIN HFA) 90 mcg/actuation inhaler INHALE 2 PUFFS INTO THE LUNGS EVERY 6 HOURS AS NEEDED FOR WHEEZING 24 g 0    azelastine (ASTELIN) 137 mcg (0.1 %) nasal spray SPRAY ONCE IN EACH NOSTRIL TWICE DAILY 90 mL 1    fluticasone propionate (FLONASE) 50 mcg/actuation nasal spray SHAKE LIQUID AND USE 2 SPRAYS(100 MCG) IN EACH NOSTRIL EVERY DAY 48 g 1    gabapentin (NEURONTIN) 800 MG tablet Take 800 mg by mouth 3 (three) times daily.      ibuprofen (ADVIL,MOTRIN) 800 MG tablet Take 800 mg by mouth.      levocetirizine (XYZAL) 5 MG tablet TAKE 1 TABLET(5 MG) BY MOUTH EVERY EVENING 30 tablet 3    multivitamin capsule Take 1 capsule by mouth once daily.      omeprazole (PRILOSEC) 40 MG capsule Take 1 capsule (40 mg total) by mouth 2 (two) times daily before meals. 180 capsule 1    oxyCODONE-acetaminophen (PERCOCET)  mg per tablet Take 1 tablet by mouth 4 (four) times daily as needed.      potassium chloride SA (K-DUR,KLOR-CON M) 10 MEQ tablet Take 2 tablets (20 mEq total) by mouth once daily. 30 tablet 2    QUEtiapine (SEROQUEL) 50 MG tablet Take 50 mg by mouth nightly as needed.      tiZANidine (ZANAFLEX) 4 MG tablet Take 4 mg by mouth every 6 (six) hours as needed.       No current facility-administered medications for this visit.       Review of patient's allergies indicates:   Allergen Reactions    Codeine Nausea And Vomiting     If taken with tylenol    Doxycycline      Makes symptoms worse    Skelaxin [metaxalone] Other  (See Comments)     Burning in throat    Zoloft [sertraline] Rash       Physical examination  Vitals Reviewed\  Vitals:    05/30/23 1108   BP: 94/60   Pulse: 94     Body mass index is 19.85 kg/m².   Gen. Well-dressed well-nourished   Skin warm dry and intact.  No rashes noted.  HEENT.  TM intact bilateral with normal light reflex.  No mastoid tenderness during percussion.  Nares patent bilateral.  Pharynx is unremarkable.  No maxillary or frontal sinus tenderness when percussed.    Neck is supple without adenopathy  Chest.  Respirations are even unlabored.  Lungs are clear to auscultation.  Cardiac regular rate and rhythm.  No chest wall adenopathy noted.  Neuro. Awake alert oriented x4.  Normal judgment and cognition noted.  Extremities no clubbing cyanosis or edema noted.     Call or return to clinic prn if these symptoms worsen or fail to improve as anticipated.

## 2023-06-06 NOTE — TELEPHONE ENCOUNTER
states her ear is bleeding when cleaning it; state she does not have gas money to come in for an appt and would like someone to call back     5

## 2023-06-08 ENCOUNTER — TELEPHONE (OUTPATIENT)
Dept: FAMILY MEDICINE | Facility: CLINIC | Age: 53
End: 2023-06-08
Payer: MEDICARE

## 2023-06-09 RX ORDER — AZITHROMYCIN 250 MG/1
TABLET, FILM COATED ORAL
Qty: 6 TABLET | Refills: 0 | Status: SHIPPED | OUTPATIENT
Start: 2023-06-09 | End: 2023-09-12 | Stop reason: ALTCHOICE

## 2023-06-09 NOTE — TELEPHONE ENCOUNTER
----- Message from Victor Manuel Magdaleno sent at 6/8/2023 10:33 AM CDT -----  Contact: self  Type: Needs Medical Advice  Who Called:  pt  Symptoms (please be specific):  abscess in mouth  How long has patient had these symptoms:  1 week  Pharmacy name and phone #:    [facility] #[id number] - [place], [place] [address] [address]  [address] [address]  Phone: [phone] Fax: [phone]      Best Call Back Number: [phone]    Additional Information: pt refused same day appt   Pt would like to speak with staff  Thank you        
Patient refused same day appointment. Please advise on how to proceed   
Sinus meds sent.   
oral

## 2023-06-12 ENCOUNTER — TELEPHONE (OUTPATIENT)
Dept: FAMILY MEDICINE | Facility: CLINIC | Age: 53
End: 2023-06-12
Payer: MEDICARE

## 2023-06-12 NOTE — TELEPHONE ENCOUNTER
Spoke to patient.   She states she picked up her Z-gin  today.   Instructed to call back if not better.  Verbalizes understanding      ----- Message from Tory Hernandez sent at 6/12/2023  9:36 AM CDT -----  Contact: Patient  Type:  Sooner Appointment Request  Caller is requesting a sooner appointment.  Caller declined first available appointment listed below.  Caller will not accept being placed on the waitlist and is requesting a message be sent to doctor.    Name of Caller:  Patient  When is the first available appointment?  na  Symptoms:  has fever, cold sweat, bad sinus infection and coughing up stuff  Best Call Back Number:  [phone]  Additional Information:  Please call the patient back to schedule/advise. Thank you!

## 2023-06-22 RX ORDER — OMEPRAZOLE 40 MG/1
CAPSULE, DELAYED RELEASE ORAL
Qty: 180 CAPSULE | Refills: 1 | Status: SHIPPED | OUTPATIENT
Start: 2023-06-22 | End: 2024-03-07

## 2023-08-13 DIAGNOSIS — J22 LOWER RESPIRATORY INFECTION (E.G., BRONCHITIS, PNEUMONIA, PNEUMONITIS, PULMONITIS): ICD-10-CM

## 2023-08-14 NOTE — TELEPHONE ENCOUNTER
----- Message from Jam Ayala sent at 8/14/2023  3:29 PM CDT -----  Regarding: advice  Contact: CORINNE LOPEZ [[id number]]  Type: Needs Medical Advice  Who Called:  Corinne    Symptoms (please be specific):  Severe Congestion, weezing, sore throat, and dizziness    How long has patient had these symptoms:  1 day    Pharmacy name and phone #:    [facility] #[id number] - [place] [address] AT [facility] [address]  [address] [place] [address]  Phone: [phone] Fax: [phone]      Best Call Back Number: [phone]    Additional Information: Pt advised has HX of same problem. Please call to advise.

## 2023-08-14 NOTE — TELEPHONE ENCOUNTER
Refill Routing Note     Refill Routing Note   Medication(s) are not appropriate for processing by [facility] for the following reason(s):      Non-participating provider    [facility] action(s):  Route Care Due:  None identified            Appointments  past 12m or future 3m with PCP    Date Provider   Last Visit   5/30/2023 Lexx Puentes NP   Next Visit   Visit date not found Lexx Puentes NP   ED visits in past 90 days: 0        Note composed:4:12 AM 08/14/2023

## 2023-08-15 ENCOUNTER — TELEPHONE (OUTPATIENT)
Dept: FAMILY MEDICINE | Facility: CLINIC | Age: 53
End: 2023-08-15
Payer: MEDICARE

## 2023-08-15 RX ORDER — ALBUTEROL SULFATE 90 UG/1
AEROSOL, METERED RESPIRATORY (INHALATION)
Qty: 18 G | Refills: 0 | Status: SHIPPED | OUTPATIENT
Start: 2023-08-15 | End: 2023-08-31

## 2023-08-15 NOTE — TELEPHONE ENCOUNTER
----- Message from Jam Lucy sent at 8/15/2023  1:43 PM CDT -----  Regarding: advice  Contact: CORINNE LOPEZ [[id number]]  Type: Needs Medical Advice  Who Called:  Corinne    Symptoms (please be specific):  fever and chills    How long has patient had these symptoms:  last night    Pharmacy name and phone #:    [facility] #[id number] [place] [address] [address] [address]  Phone: [phone] Fax: [phone]      Best Call Back Number: [phone]    Additional Information: Please call to advise.

## 2023-08-24 ENCOUNTER — TELEPHONE (OUTPATIENT)
Dept: FAMILY MEDICINE | Facility: CLINIC | Age: 53
End: 2023-08-24
Payer: MEDICARE

## 2023-08-24 NOTE — TELEPHONE ENCOUNTER
Returned patients call in regards to feeling under the weather.   No answer , unable to leave voicemail.     Patient was scheduled for the 18th to be seen but appointment was cancelled.

## 2023-08-24 NOTE — TELEPHONE ENCOUNTER
----- Message from Desiree Biswas sent at 8/24/2023  7:09 AM CDT -----  Contact: self  Type:  Patient Returning Call    Who Called:  patient  Who Left Message for Patient:  Abe  Does the patient know what this is regarding?:  ret pts call about sore throat, coughing up mucus, and dizzy  Best Call Back Number:  [phone]  Additional Information:  Thanks

## 2023-08-24 NOTE — TELEPHONE ENCOUNTER
----- Message from Lance Lowery sent at 8/23/2023  5:06 PM CDT -----  Contact: tatiana  Type:  Needs Medical Advice    Who Called: patient    Would the patient rather a call back or a response via [facility]? Call back   Best Call Back Number: [phone]  Additional Information: pt would like to speak with physician pt stated that she been under the weather for a few days now

## 2023-08-24 NOTE — TELEPHONE ENCOUNTER
Returned patients call. Scheduled appointment to a time and date convenient for patient. Patient advised of otc medications to take until her appointment. Advised to go to ED if symptoms worsen. Verbalized understanding.

## 2023-08-29 NOTE — TELEPHONE ENCOUNTER
----- Message from Bruno Madden sent at 4/13/2022  8:29 AM CDT -----  Regarding: Call Back  Who Called: pt         What is the reason for the call: pt is calling in regards to being told that she needed a covid test done. No orders listed. Advised no covid test unless pre procedural. Please contact to advise.          Can patient be contacted on [facility]: No          Call back number:  [phone]     Patient is in the supine position.

## 2023-08-30 DIAGNOSIS — J22 LOWER RESPIRATORY INFECTION (E.G., BRONCHITIS, PNEUMONIA, PNEUMONITIS, PULMONITIS): ICD-10-CM

## 2023-08-31 RX ORDER — ALBUTEROL SULFATE 90 UG/1
AEROSOL, METERED RESPIRATORY (INHALATION)
Qty: 18 G | Refills: 0 | Status: SHIPPED | OUTPATIENT
Start: 2023-08-31 | End: 2023-09-29

## 2023-09-01 ENCOUNTER — TELEPHONE (OUTPATIENT)
Dept: FAMILY MEDICINE | Facility: CLINIC | Age: 53
End: 2023-09-01
Payer: MEDICARE

## 2023-09-01 NOTE — TELEPHONE ENCOUNTER
----- Message from Lawson Caraballo sent at 9/1/2023  1:08 PM CDT -----  Contact: self  Type: Sooner Appointment Request        Caller is requesting a sooner appointment. Caller declined first available appointment listed below. Caller will not accept being placed on the waitlist and is requesting a message be sent to doctor.        Name of Caller: Patient   Best Call Back Number: [phone]  Additional Information: cough and cold symptoms , dizziness  AWV DUE-nlr. Pt states she needs to be seen had a blow out today couldn't make appt. Thanks

## 2023-09-07 RX ORDER — POTASSIUM CHLORIDE 750 MG/1
TABLET, EXTENDED RELEASE ORAL
Qty: 30 TABLET | Refills: 2 | Status: SHIPPED | OUTPATIENT
Start: 2023-09-07 | End: 2023-12-06

## 2023-09-12 ENCOUNTER — HOSPITAL ENCOUNTER (OUTPATIENT)
Dept: RADIOLOGY | Facility: CLINIC | Age: 53
Discharge: HOME | End: 2023-09-12
Attending: NURSE PRACTITIONER
Payer: MEDICARE

## 2023-09-12 ENCOUNTER — OFFICE VISIT (OUTPATIENT)
Dept: FAMILY MEDICINE | Facility: CLINIC | Age: 53
End: 2023-09-12
Payer: MEDICARE

## 2023-09-12 VITALS
WEIGHT: 103.81 LBS | OXYGEN SATURATION: 98 % | SYSTOLIC BLOOD PRESSURE: 98 MMHG | DIASTOLIC BLOOD PRESSURE: 62 MMHG | HEIGHT: 60 IN | BODY MASS INDEX: 20.38 KG/M2 | HEART RATE: 61 BPM

## 2023-09-12 DIAGNOSIS — R09.81 SINUS CONGESTION: Primary | ICD-10-CM

## 2023-09-12 DIAGNOSIS — R06.2 WHEEZE: ICD-10-CM

## 2023-09-12 DIAGNOSIS — R05.1 ACUTE COUGH: ICD-10-CM

## 2023-09-12 LAB
CTP QC/QA: YES
CTP QC/QA: YES
POC MOLECULAR INFLUENZA A AGN: NEGATIVE
POC MOLECULAR INFLUENZA B AGN: NEGATIVE
SARS-COV-2 RDRP RESP QL NAA+PROBE: NEGATIVE

## 2023-09-12 PROCEDURE — 71046 X-RAY EXAM CHEST 2 VIEWS: CPT | Mod: TC,FY,PO

## 2023-09-12 PROCEDURE — 99999 PR PBB SHADOW E&M-EST. PATIENT-LVL III: CPT | Mod: PBBFAC,,, | Performed by: NURSE PRACTITIONER

## 2023-09-12 PROCEDURE — 99213 OFFICE O/P EST LOW 20 MIN: CPT | Mod: PBBFAC,25,PO | Performed by: NURSE PRACTITIONER

## 2023-09-12 PROCEDURE — 99214 OFFICE O/P EST MOD 30 MIN: CPT | Mod: S$PBB,,, | Performed by: NURSE PRACTITIONER

## 2023-09-12 PROCEDURE — 87635 SARS-COV-2 COVID-19 AMP PRB: CPT | Mod: PBBFAC,PO | Performed by: NURSE PRACTITIONER

## 2023-09-12 PROCEDURE — 87502 INFLUENZA DNA AMP PROBE: CPT | Mod: PBBFAC,PO | Performed by: NURSE PRACTITIONER

## 2023-09-12 PROCEDURE — 99214 PR OFFICE/OUTPT VISIT, EST, LEVL IV, 30-39 MIN: ICD-10-PCS | Mod: S$PBB,,, | Performed by: NURSE PRACTITIONER

## 2023-09-12 PROCEDURE — 99999 PR PBB SHADOW E&M-EST. PATIENT-LVL III: ICD-10-PCS | Mod: PBBFAC,,, | Performed by: NURSE PRACTITIONER

## 2023-09-12 PROCEDURE — 99999PBSHW POCT INFLUENZA A/B MOLECULAR: ICD-10-PCS | Mod: PBBFAC,,,

## 2023-09-12 PROCEDURE — 71046 X-RAY EXAM CHEST 2 VIEWS: CPT | Mod: 26,,, | Performed by: RADIOLOGY

## 2023-09-12 PROCEDURE — 71046 XR CHEST PA AND LATERAL: ICD-10-PCS | Mod: 26,,, | Performed by: RADIOLOGY

## 2023-09-12 PROCEDURE — 99999PBSHW: Mod: PBBFAC,,,

## 2023-09-12 PROCEDURE — 99999PBSHW POCT INFLUENZA A/B MOLECULAR: Mod: PBBFAC,,,

## 2023-09-12 RX ORDER — FLUTICASONE PROPIONATE 100 UG/1
2 POWDER, METERED RESPIRATORY (INHALATION) 2 TIMES DAILY
Qty: 120 EACH | Refills: 11 | Status: SHIPPED | OUTPATIENT
Start: 2023-09-12 | End: 2024-09-11

## 2023-09-12 RX ORDER — CEFDINIR 300 MG/1
300 CAPSULE ORAL 2 TIMES DAILY
Qty: 20 CAPSULE | Refills: 0 | Status: SHIPPED | OUTPATIENT
Start: 2023-09-12 | End: 2023-09-22

## 2023-09-13 NOTE — PROGRESS NOTES
This dictation has been generated using Modal Fluency Dictation some phonetic errors may occur. Please contact author for clarification if needed.     Problem List Items Addressed This Visit    None  Visit Diagnoses       Sinus congestion    -  Primary    Relevant Orders    POCT Influenza A/B Molecular (Completed)    POCT COVID-19 Rapid Screening (Completed)    Acute cough        Relevant Orders    X-Ray Chest PA And Lateral (Completed)    Wheeze        Relevant Orders    X-Ray Chest PA And Lateral (Completed)            Orders Placed This Encounter    X-Ray Chest PA And Lateral    POCT Influenza A/B Molecular    POCT COVID-19 Rapid Screening    fluticasone propionate (FLOVENT DISKUS) 100 mcg/actuation inhaler    cefdinir (OMNICEF) 300 MG capsule     Sinus congestion COVID and flu negative.  Cough and wheeze chest x-ray unremarkable compared to previous no evidence of pneumonia noted per my independent interpretation of images.  Bacterial bronchitis sinusitis cefdinir as above.  Add Flovent Diskus follow-up in a week.  Defer clearance for back injections at this time.    Follow up in about 1 week (around 9/19/2023).    ________________________________________________________________  ________________________________________________________________      Chief Complaint   Patient presents with    Cough    Dizziness    Sore Throat     History of present illness  This 53 y.o. presents today for complaint of sinus issues, coughing, and requests clearance for back injection.    Sinus and cough issues more than a week.  This has been a recurrent issue.  Denies chest pain or shortness a breath.  Notes sinus issues and pressure.  Has had headaches.      Past Medical History:   Diagnosis Date    Abnormal uterine and vaginal bleeding, unspecified 10/29/2018    Allergy     Arthritis     Asthma 1989    Cancer 1991    cervical    CHF (congestive heart failure)     one episode, no follow up    Chronic back pain 1990    severe  scoliosis    Depression     GERD (gastroesophageal reflux disease)     History of psychiatric care     History of psychiatric hospitalization     Menorrhagia with regular cycle 10/11/2018    Migraine     Primary dysmenorrhea 10/11/2018    Psychiatric exam requested by authority     Psychiatric problem     Scoliosis 1978    Therapy        Past Surgical History:   Procedure Laterality Date    APPENDECTOMY  1991    COLPOSCOPY      EPIDURAL STEROID INJECTION INTO LUMBAR SPINE      HYSTERECTOMY  2018    LAPAROSCOPIC CHOLECYSTECTOMY WITH CHOLANGIOGRAPHY N/A 3/11/2021    Procedure: CHOLECYSTECTOMY, LAPAROSCOPIC, WITH CHOLANGIOGRAM;  Surgeon: Deshawn Mccracken III, MD;  Location: [facility] OR;  Service: General;  Laterality: N/A;    LAPAROSCOPIC SALPINGO-OOPHORECTOMY  10/29/2018    Procedure: SALPINGO-OOPHORECTOMY, LAPAROSCOPIC;  Surgeon: Matthew Rene MD;  Location: [facility] OR;  Service: OB/GYN;;    LAPAROSCOPIC TOTAL HYSTERECTOMY N/A 10/29/2018    Procedure: HYSTERECTOMY, TOTAL, LAPAROSCOPIC;  Surgeon: Matthew Rene MD;  Location: [facility] OR;  Service: OB/GYN;  Laterality: N/A;    MULTIPLE TOOTH EXTRACTIONS      NASAL SEPTOPLASTY Bilateral 9/25/2020    Procedure: SEPTOPLASTY, NASAL;  Surgeon: Surinder Bobby MD;  Location: [facility] OR;  Service: ENT;  Laterality: Bilateral;    NASAL TURBINATE REDUCTION Bilateral 9/25/2020    Procedure: REDUCTION, NASAL TURBINATE;  Surgeon: Surinder Bobby MD;  Location: [facility] OR;  Service: ENT;  Laterality: Bilateral;    RHIZOTOMY W/ RADIOFREQUENCY ABLATION Bilateral     UMBILICAL HERNIA REPAIR N/A 2/21/2022    Procedure: REPAIR, HERNIA, UMBILICAL, AGE 5 YEARS OR OLDER;  Surgeon: Deshawn Mccracken III, MD;  Location: [facility] OR;  Service: General;  Laterality: N/A;       Family History   Adopted: Yes   Problem Relation Age of Onset    Breast cancer Mother     Breast cancer Maternal Grandmother        Social History     Socioeconomic History    Marital status: Legally    Tobacco Use     Smoking status: Every Day     Current packs/day: 1.00     Average packs/day: 1 pack/day for 40.0 years (40.0 ttl pk-yrs)     Types: Cigarettes    Smokeless tobacco: Never    Tobacco comments:     quit cold turkey twicce for 5 years with no difficulty.   Substance and Sexual Activity    Alcohol use: No    Drug use: No    Sexual activity: Yes     Partners: Male     Birth control/protection: None   Other Topics Concern    Patient feels they ought to cut down on drinking/drug use No    Patient annoyed by others criticizing their drinking/drug use Yes    Patient has felt bad or guilty about drinking/drug use No    Patient has had a drink/used drugs as an eye opener in the AM No       Current Outpatient Medications   Medication Sig Dispense Refill    albuterol (PROVENTIL/VENTOLIN HFA) 90 mcg/actuation inhaler INHALE 2 PUFFS INTO THE LUNGS EVERY 6 HOURS AS NEEDED FOR WHEEZING 18 g 0    azelastine (ASTELIN) 137 mcg (0.1 %) nasal spray SPRAY ONCE IN EACH NOSTRIL TWICE DAILY 90 mL 1    fluticasone propionate (FLONASE) 50 mcg/actuation nasal spray SHAKE LIQUID AND USE 2 SPRAYS(100 MCG) IN EACH NOSTRIL EVERY DAY 48 g 1    gabapentin (NEURONTIN) 800 MG tablet Take 800 mg by mouth 3 (three) times daily.      ibuprofen (ADVIL,MOTRIN) 800 MG tablet Take 800 mg by mouth.      levocetirizine (XYZAL) 5 MG tablet TAKE 1 TABLET(5 MG) BY MOUTH EVERY EVENING 30 tablet 3    multivitamin capsule Take 1 capsule by mouth once daily.      omeprazole (PRILOSEC) 40 MG capsule TAKE 1 CAPSULE(40 MG) BY MOUTH TWICE DAILY BEFORE MEALS 180 capsule 1    oxyCODONE-acetaminophen (PERCOCET)  mg per tablet Take 1 tablet by mouth 4 (four) times daily as needed.      potassium chloride SA (K-DUR,KLOR-CON M) 10 MEQ tablet TAKE 2 TABLETS(20 MEQ) BY MOUTH EVERY DAY 30 tablet 2    QUEtiapine (SEROQUEL) 50 MG tablet Take 50 mg by mouth nightly as needed.      tiZANidine (ZANAFLEX) 4 MG tablet Take 4 mg by mouth every 6 (six) hours as needed.      cefdinir  (OMNICEF) 300 MG capsule Take 1 capsule (300 mg total) by mouth 2 (two) times daily. for 10 days 20 capsule 0    fluticasone propionate (FLOVENT DISKUS) 100 mcg/actuation inhaler Inhale 2 puffs (200 mcg total) into the lungs 2 (two) times daily. Controller 120 each 11     No current facility-administered medications for this visit.       Review of patient's allergies indicates:   Allergen Reactions    Codeine Nausea And Vomiting     If taken with tylenol    Doxycycline      Makes symptoms worse    Skelaxin [metaxalone] Other (See Comments)     Burning in throat    Zoloft [sertraline] Rash       Physical examination  Vitals Reviewed\  Vitals:    09/12/23 1042   BP: 98/62   Pulse: 61     Body mass index is 20.28 kg/m².   Gen. Well-dressed well-nourished   Skin warm dry and intact.  No rashes noted.  HEENT.  TM intact bilateral with normal light reflex.  No mastoid tenderness during percussion.  Nares patent bilateral.  Pharynx is unremarkable.  maxillary sinus tenderness when percussed.    Neck is supple without adenopathy  Chest.  Respirations are even unlabored.  Scattered rhonchi and wheezes noted inspiratory and expiratory anterior and posterior chest.  Cardiac regular rate and rhythm.  No chest wall adenopathy noted.  Neuro. Awake alert oriented x4.  Normal judgment and cognition noted.  Extremities no clubbing cyanosis or edema noted.     Call or return to clinic prn if these symptoms worsen or fail to improve as anticipated.

## 2023-09-22 ENCOUNTER — TELEPHONE (OUTPATIENT)
Dept: FAMILY MEDICINE | Facility: CLINIC | Age: 53
End: 2023-09-22
Payer: MEDICARE

## 2023-09-22 DIAGNOSIS — Z12.31 ENCOUNTER FOR SCREENING MAMMOGRAM FOR BREAST CANCER: Primary | ICD-10-CM

## 2023-09-22 NOTE — TELEPHONE ENCOUNTER
----- Message from Donna Freitas sent at 9/22/2023  8:10 AM CDT -----  Type:  Needs Medical Advice    Who Called: pt   Best Call Back Number: [phone] (home)     Additional Information:  Requesting call back  requesting mammo orders     please advise thank you

## 2023-09-22 NOTE — TELEPHONE ENCOUNTER
Called patient and scheduled mammo per request. Appointment successfully scheduled , verbalized understanding.

## 2023-09-27 DIAGNOSIS — J22 LOWER RESPIRATORY INFECTION (E.G., BRONCHITIS, PNEUMONIA, PNEUMONITIS, PULMONITIS): ICD-10-CM

## 2023-09-29 ENCOUNTER — OFFICE VISIT (OUTPATIENT)
Dept: FAMILY MEDICINE | Facility: CLINIC | Age: 53
End: 2023-09-29
Payer: MEDICARE

## 2023-09-29 VITALS
HEIGHT: 60 IN | DIASTOLIC BLOOD PRESSURE: 58 MMHG | SYSTOLIC BLOOD PRESSURE: 98 MMHG | WEIGHT: 101.44 LBS | HEART RATE: 71 BPM | BODY MASS INDEX: 19.91 KG/M2 | OXYGEN SATURATION: 98 %

## 2023-09-29 DIAGNOSIS — Z51.6 ALLERGY DESENSITIZATION THERAPY: Primary | ICD-10-CM

## 2023-09-29 DIAGNOSIS — R50.81 FEVER IN OTHER DISEASES: ICD-10-CM

## 2023-09-29 DIAGNOSIS — M79.89 ARM SWELLING: ICD-10-CM

## 2023-09-29 DIAGNOSIS — R50.9 FEVER, UNSPECIFIED FEVER CAUSE: ICD-10-CM

## 2023-09-29 LAB — SARS-COV-2 RNA RESP QL NAA+PROBE: NOT DETECTED

## 2023-09-29 PROCEDURE — 87635 SARS-COV-2 COVID-19 AMP PRB: CPT | Performed by: NURSE PRACTITIONER

## 2023-09-29 PROCEDURE — 99213 OFFICE O/P EST LOW 20 MIN: CPT | Mod: PBBFAC,PO | Performed by: NURSE PRACTITIONER

## 2023-09-29 PROCEDURE — 99999 PR PBB SHADOW E&M-EST. PATIENT-LVL III: CPT | Mod: PBBFAC,,, | Performed by: NURSE PRACTITIONER

## 2023-09-29 PROCEDURE — 99999 PR PBB SHADOW E&M-EST. PATIENT-LVL III: ICD-10-PCS | Mod: PBBFAC,,, | Performed by: NURSE PRACTITIONER

## 2023-09-29 PROCEDURE — 99213 OFFICE O/P EST LOW 20 MIN: CPT | Mod: S$PBB,,, | Performed by: NURSE PRACTITIONER

## 2023-09-29 PROCEDURE — 99213 PR OFFICE/OUTPT VISIT, EST, LEVL III, 20-29 MIN: ICD-10-PCS | Mod: S$PBB,,, | Performed by: NURSE PRACTITIONER

## 2023-09-29 RX ORDER — SULFAMETHOXAZOLE AND TRIMETHOPRIM 800; 160 MG/1; MG/1
1 TABLET ORAL 2 TIMES DAILY
Qty: 14 TABLET | Refills: 0 | Status: SHIPPED | OUTPATIENT
Start: 2023-09-29 | End: 2023-10-06

## 2023-09-29 RX ORDER — ALBUTEROL SULFATE 90 UG/1
AEROSOL, METERED RESPIRATORY (INHALATION)
Qty: 18 G | Refills: 0 | Status: SHIPPED | OUTPATIENT
Start: 2023-09-29 | End: 2023-10-20

## 2023-10-03 NOTE — PROGRESS NOTES
This dictation has been generated using Modal Fluency Dictation some phonetic errors may occur. Please contact author for clarification if needed.     Problem List Items Addressed This Visit    None  Visit Diagnoses       Allergy desensitization therapy    -  Primary    Relevant Orders    Ambulatory referral/consult to Allergy    Fever, unspecified fever cause        Fever in other diseases        Arm swelling        mammo 10/13/23            Orders Placed This Encounter    COVID-19 Routine Screening    Ambulatory referral/consult to Allergy    sulfamethoxazole-trimethoprim 800-160mg (BACTRIM DS) 800-160 mg Tab     Recurrent sinus issues refer to Allergy for desensitization.    Fever Bactrim as above COVID test negative cover for sinus    No follow-ups on file.    ________________________________________________________________  ________________________________________________________________      Chief Complaint   Patient presents with    Follow-up     History of present illness  This 53 y.o. presents today for complaint of sinus issues.  Notes fever and fatigue.  Notes chronic congestion.  She saw allergy years ago.  Current flare greater than a week.    Past Medical History:   Diagnosis Date    Abnormal uterine and vaginal bleeding, unspecified 10/29/2018    Allergy     Arthritis     Asthma 1989    Cancer 1991    cervical    CHF (congestive heart failure)     one episode, no follow up    Chronic back pain 1990    severe scoliosis    Depression     GERD (gastroesophageal reflux disease)     History of psychiatric care     History of psychiatric hospitalization     Menorrhagia with regular cycle 10/11/2018    Migraine     Primary dysmenorrhea 10/11/2018    Psychiatric exam requested by authority     Psychiatric problem     Scoliosis 1978    Therapy        Past Surgical History:   Procedure Laterality Date    APPENDECTOMY  1991    COLPOSCOPY      EPIDURAL STEROID INJECTION INTO LUMBAR SPINE      HYSTERECTOMY  2018     LAPAROSCOPIC CHOLECYSTECTOMY WITH CHOLANGIOGRAPHY N/A 3/11/2021    Procedure: CHOLECYSTECTOMY, LAPAROSCOPIC, WITH CHOLANGIOGRAM;  Surgeon: Deshawn Mccracken III, MD;  Location: [facility] OR;  Service: General;  Laterality: N/A;    LAPAROSCOPIC SALPINGO-OOPHORECTOMY  10/29/2018    Procedure: SALPINGO-OOPHORECTOMY, LAPAROSCOPIC;  Surgeon: Matthew Rene MD;  Location: [facility] OR;  Service: OB/GYN;;    LAPAROSCOPIC TOTAL HYSTERECTOMY N/A 10/29/2018    Procedure: HYSTERECTOMY, TOTAL, LAPAROSCOPIC;  Surgeon: Matthew Rene MD;  Location: [facility] OR;  Service: OB/GYN;  Laterality: N/A;    MULTIPLE TOOTH EXTRACTIONS      NASAL SEPTOPLASTY Bilateral 9/25/2020    Procedure: SEPTOPLASTY, NASAL;  Surgeon: Surinder Bobby MD;  Location: [facility] OR;  Service: ENT;  Laterality: Bilateral;    NASAL TURBINATE REDUCTION Bilateral 9/25/2020    Procedure: REDUCTION, NASAL TURBINATE;  Surgeon: Surinder Bobby MD;  Location: [facility] OR;  Service: ENT;  Laterality: Bilateral;    RHIZOTOMY W/ RADIOFREQUENCY ABLATION Bilateral     UMBILICAL HERNIA REPAIR N/A 2/21/2022    Procedure: REPAIR, HERNIA, UMBILICAL, AGE 5 YEARS OR OLDER;  Surgeon: Deshawn Mccracken III, MD;  Location: [facility] OR;  Service: General;  Laterality: N/A;       Family History   Adopted: Yes   Problem Relation Age of Onset    Breast cancer Mother     Breast cancer Maternal Grandmother        Social History     Socioeconomic History    Marital status: Legally    Tobacco Use    Smoking status: Every Day     Current packs/day: 1.00     Average packs/day: 1 pack/day for 40.0 years (40.0 ttl pk-yrs)     Types: Cigarettes    Smokeless tobacco: Never    Tobacco comments:     quit cold turkey twicce for 5 years with no difficulty.   Substance and Sexual Activity    Alcohol use: No    Drug use: No    Sexual activity: Yes     Partners: Male     Birth control/protection: None   Other Topics Concern    Patient feels they ought to cut down on drinking/drug use No    Patient  annoyed by others criticizing their drinking/drug use Yes    Patient has felt bad or guilty about drinking/drug use No    Patient has had a drink/used drugs as an eye opener in the AM No       Current Outpatient Medications   Medication Sig Dispense Refill    albuterol (PROVENTIL/VENTOLIN HFA) 90 mcg/actuation inhaler INHALE 2 PUFFS INTO THE LUNGS EVERY 6 HOURS AS NEEDED FOR WHEEZING 18 g 0    azelastine (ASTELIN) 137 mcg (0.1 %) nasal spray SPRAY ONCE IN EACH NOSTRIL TWICE DAILY 90 mL 1    fluticasone propionate (FLONASE) 50 mcg/actuation nasal spray SHAKE LIQUID AND USE 2 SPRAYS(100 MCG) IN EACH NOSTRIL EVERY DAY 48 g 1    fluticasone propionate (FLOVENT DISKUS) 100 mcg/actuation inhaler Inhale 2 puffs (200 mcg total) into the lungs 2 (two) times daily. Controller 120 each 11    gabapentin (NEURONTIN) 800 MG tablet Take 800 mg by mouth 3 (three) times daily.      ibuprofen (ADVIL,MOTRIN) 800 MG tablet Take 800 mg by mouth.      levocetirizine (XYZAL) 5 MG tablet TAKE 1 TABLET(5 MG) BY MOUTH EVERY EVENING 30 tablet 3    multivitamin capsule Take 1 capsule by mouth once daily.      omeprazole (PRILOSEC) 40 MG capsule TAKE 1 CAPSULE(40 MG) BY MOUTH TWICE DAILY BEFORE MEALS 180 capsule 1    oxyCODONE-acetaminophen (PERCOCET)  mg per tablet Take 1 tablet by mouth 4 (four) times daily as needed.      potassium chloride SA (K-DUR,KLOR-CON M) 10 MEQ tablet TAKE 2 TABLETS(20 MEQ) BY MOUTH EVERY DAY 30 tablet 2    QUEtiapine (SEROQUEL) 50 MG tablet Take 50 mg by mouth nightly as needed.      tiZANidine (ZANAFLEX) 4 MG tablet Take 4 mg by mouth every 6 (six) hours as needed.      sulfamethoxazole-trimethoprim 800-160mg (BACTRIM DS) 800-160 mg Tab Take 1 tablet by mouth 2 (two) times daily. for 7 days 14 tablet 0     No current facility-administered medications for this visit.       Review of patient's allergies indicates:   Allergen Reactions    Codeine Nausea And Vomiting     If taken with tylenol    Doxycycline       Makes symptoms worse    Skelaxin [metaxalone] Other (See Comments)     Burning in throat    Zoloft [sertraline] Rash       Physical examination  Vitals Reviewed\  Vitals:    09/29/23 1601   BP: (!) 98/58   Pulse: 71     Body mass index is 19.81 kg/m².   Gen. Well-dressed well-nourished   Skin warm dry and intact.  No rashes noted.  Chest.  Respirations are even unlabored.  Lungs are clear to auscultation.  Cardiac regular rate and rhythm.  No chest wall adenopathy noted.  Neuro. Awake alert oriented x4.  Normal judgment and cognition noted.  Extremities no clubbing cyanosis or edema noted.     Call or return to clinic prn if these symptoms worsen or fail to improve as anticipated.

## 2023-10-20 DIAGNOSIS — J22 LOWER RESPIRATORY INFECTION (E.G., BRONCHITIS, PNEUMONIA, PNEUMONITIS, PULMONITIS): ICD-10-CM

## 2023-10-20 RX ORDER — ALBUTEROL SULFATE 90 UG/1
AEROSOL, METERED RESPIRATORY (INHALATION)
Qty: 18 G | Refills: 0 | Status: SHIPPED | OUTPATIENT
Start: 2023-10-20 | End: 2023-12-06

## 2023-10-23 ENCOUNTER — TELEPHONE (OUTPATIENT)
Dept: FAMILY MEDICINE | Facility: CLINIC | Age: 53
End: 2023-10-23
Payer: MEDICARE

## 2023-10-23 NOTE — TELEPHONE ENCOUNTER
----- Message from Mariaelena Velazquez, Patient Care Assistant sent at 10/20/2023  3:50 PM CDT -----  Contact: self  Pt was denied refill for med albuterol (PROVENTIL/VENTOLIN HFA) 90 mcg/actuation inhaler. Please call back to advise [phone]  thanks

## 2023-11-06 ENCOUNTER — TELEPHONE (OUTPATIENT)
Dept: ALLERGY | Facility: CLINIC | Age: 53
End: 2023-11-06
Payer: MEDICARE

## 2023-11-06 NOTE — TELEPHONE ENCOUNTER
----- Message from Maeve Feng sent at 11/6/2023  1:15 PM CST -----  Contact: pt  Type: Needs Medical Advice  Who Called: pt  Best Call Back Number: [phone]    Additional Information: Pt is calling the office regarding faxed that needs to be signed off for injections.Please call back and advise.    Phone call in response to above message.  No answer left voicemail.

## 2023-11-09 ENCOUNTER — OFFICE VISIT (OUTPATIENT)
Dept: FAMILY MEDICINE | Facility: CLINIC | Age: 53
End: 2023-11-09
Payer: MEDICARE

## 2023-11-09 VITALS
WEIGHT: 100.5 LBS | HEIGHT: 60 IN | BODY MASS INDEX: 19.73 KG/M2 | HEART RATE: 70 BPM | DIASTOLIC BLOOD PRESSURE: 62 MMHG | OXYGEN SATURATION: 95 % | SYSTOLIC BLOOD PRESSURE: 102 MMHG

## 2023-11-09 DIAGNOSIS — M25.551 RIGHT HIP PAIN: ICD-10-CM

## 2023-11-09 DIAGNOSIS — Z01.818 PREOP EXAM FOR INTERNAL MEDICINE: Primary | ICD-10-CM

## 2023-11-09 DIAGNOSIS — M51.36 DDD (DEGENERATIVE DISC DISEASE), LUMBAR: ICD-10-CM

## 2023-11-09 PROCEDURE — 99999 PR PBB SHADOW E&M-EST. PATIENT-LVL III: CPT | Mod: PBBFAC,,, | Performed by: NURSE PRACTITIONER

## 2023-11-09 PROCEDURE — 99999 PR PBB SHADOW E&M-EST. PATIENT-LVL III: ICD-10-PCS | Mod: PBBFAC,,, | Performed by: NURSE PRACTITIONER

## 2023-11-09 PROCEDURE — 99214 OFFICE O/P EST MOD 30 MIN: CPT | Mod: S$PBB,,, | Performed by: NURSE PRACTITIONER

## 2023-11-09 PROCEDURE — 99214 PR OFFICE/OUTPT VISIT, EST, LEVL IV, 30-39 MIN: ICD-10-PCS | Mod: S$PBB,,, | Performed by: NURSE PRACTITIONER

## 2023-11-09 PROCEDURE — 99213 OFFICE O/P EST LOW 20 MIN: CPT | Mod: PBBFAC,PO | Performed by: NURSE PRACTITIONER

## 2023-11-09 RX ORDER — ONDANSETRON HYDROCHLORIDE 8 MG/1
8 TABLET, FILM COATED ORAL 2 TIMES DAILY PRN
COMMUNITY
Start: 2023-11-06

## 2023-11-09 NOTE — PROGRESS NOTES
Patient ID: Alisson Eldridge is a 53 y.o. female.    Chief Complaint: Pre-op Exam      Alisson Eldridge is in the office Right hip pain. Pt reports tear and desire for injection with [facility]. She requests evaluation for approval.     Right hip pain. No injury reported. Notes back problems but developed an anterior hip pain on the right. She reports that a tear was discovered. That info is not available for my review. She reports that they offered her injection to get her through the holidays. She would like to delay or avoid a surgical procedure.         Past Medical History:   Diagnosis Date    Abnormal uterine and vaginal bleeding, unspecified 10/29/2018    Allergy     Arthritis     Asthma 1989    Cancer 1991    cervical    CHF (congestive heart failure)     one episode, no follow up    Chronic back pain 1990    severe scoliosis    Depression     GERD (gastroesophageal reflux disease)     History of psychiatric care     History of psychiatric hospitalization     Menorrhagia with regular cycle 10/11/2018    Migraine     Primary dysmenorrhea 10/11/2018    Psychiatric exam requested by authority     Psychiatric problem     Scoliosis 1978    Therapy                 Current Outpatient Medications:     albuterol (PROVENTIL/VENTOLIN HFA) 90 mcg/actuation inhaler, INHALE 2 PUFFS INTO THE LUNGS EVERY 6 HOURS AS NEEDED FOR WHEEZING, Disp: 18 g, Rfl: 0    azelastine (ASTELIN) 137 mcg (0.1 %) nasal spray, SPRAY ONCE IN EACH NOSTRIL TWICE DAILY, Disp: 90 mL, Rfl: 1    fluticasone propionate (FLONASE) 50 mcg/actuation nasal spray, SHAKE LIQUID AND USE 2 SPRAYS(100 MCG) IN EACH NOSTRIL EVERY DAY, Disp: 48 g, Rfl: 1    gabapentin (NEURONTIN) 800 MG tablet, Take 800 mg by mouth 3 (three) times daily., Disp: , Rfl:     ibuprofen (ADVIL,MOTRIN) 800 MG tablet, Take 800 mg by mouth., Disp: , Rfl:     levocetirizine (XYZAL) 5 MG tablet, TAKE 1 TABLET(5 MG) BY MOUTH EVERY EVENING, Disp: 30 tablet, Rfl:  "3    multivitamin capsule, Take 1 capsule by mouth once daily., Disp: , Rfl:     omeprazole (PRILOSEC) 40 MG capsule, TAKE 1 CAPSULE(40 MG) BY MOUTH TWICE DAILY BEFORE MEALS, Disp: 180 capsule, Rfl: 1    ondansetron (ZOFRAN) 8 MG tablet, Take 8 mg by mouth 2 (two) times daily as needed., Disp: , Rfl:     oxyCODONE-acetaminophen (PERCOCET)  mg per tablet, Take 1 tablet by mouth 4 (four) times daily as needed., Disp: , Rfl:     potassium chloride SA (K-DUR,KLOR-CON M) 10 MEQ tablet, TAKE 2 TABLETS(20 MEQ) BY MOUTH EVERY DAY, Disp: 30 tablet, Rfl: 2    QUEtiapine (SEROQUEL) 50 MG tablet, Take 50 mg by mouth nightly as needed., Disp: , Rfl:     tiZANidine (ZANAFLEX) 4 MG tablet, Take 4 mg by mouth every 6 (six) hours as needed., Disp: , Rfl:     fluticasone propionate (FLOVENT DISKUS) 100 mcg/actuation inhaler, Inhale 2 puffs (200 mcg total) into the lungs 2 (two) times daily. Controller, Disp: 120 each, Rfl: 11    The ASCVD Risk score (Cheikh DK, et al., 2019) failed to calculate for the following reasons:    The valid total cholesterol range is 130 to 320 mg/dL     Wt Readings from Last 3 Encounters:   11/09/23 45.6 kg (100 lb 8.5 oz)   09/29/23 46 kg (101 lb 6.6 oz)   09/12/23 47.1 kg (103 lb 13.4 oz)     Temp Readings from Last 3 Encounters:   05/10/23 98 °F (36.7 °C) (Oral)   03/16/23 98.2 °F (36.8 °C) (Oral)   02/19/23 98 °F (36.7 °C) (Oral)     BP Readings from Last 3 Encounters:   11/09/23 102/62   09/29/23 (!) 98/58   09/12/23 98/62     Pulse Readings from Last 3 Encounters:   11/09/23 70   09/29/23 71   09/12/23 61     Resp Readings from Last 3 Encounters:   02/19/23 16   02/21/22 16   02/17/22 18     PF Readings from Last 3 Encounters:   No data found for PF     SpO2 Readings from Last 3 Encounters:   11/09/23 95%   09/29/23 98%   09/12/23 98%        No results found for: "HGBA1C"  Lab Results   Component Value Date    LDLCALC 48.8 (L) 03/09/2021    CREATININE 0.9 05/10/2023       Review of Systems "   All other systems reviewed and are negative.          Objective:      Physical Exam  Vitals (Present to visit alone.) reviewed.   Constitutional:       Appearance: Normal appearance.   HENT:      Head: Normocephalic and atraumatic.   Cardiovascular:      Rate and Rhythm: Normal rate and regular rhythm.      Pulses: Normal pulses.      Heart sounds: Normal heart sounds.   Pulmonary:      Effort: Pulmonary effort is normal.      Breath sounds: Normal breath sounds.   Abdominal:      General: Abdomen is flat.      Palpations: Abdomen is soft.   Musculoskeletal:         General: Tenderness (anterior right hip) present. No swelling, deformity or signs of injury.      Cervical back: Normal range of motion and neck supple.      Right lower leg: No edema.      Left lower leg: No edema.      Comments: Pain with rom of right hip. Pain with abduction and adduction. Hip without evidence of laxity. Pt is ambulatory without assistance.    Skin:     General: Skin is warm and dry.   Neurological:      General: No focal deficit present.      Mental Status: She is alert and oriented to person, place, and time.             Screening recommendations appropriate to age and health status were reviewed.    Preop exam for internal medicine    Right hip pain    DDD (degenerative disc disease), lumbar        Exercise tolerance no limits. Mets 10.     RCRI risk factors include: no known RCRI risk factors. As such, per RCRI the risk of cardiac death, nonfatal myocardial infarction, or nonfatal cardiac arrest is 0.4% and the risk of myocardial infarction, pulmonary edema, ventricular fibrillation, primary cardiac arrest, or complete heart block is 0.5%.  Overall this patient can be considered low risk for this low risk procedure. No further cardiac testing is recommended at this time. Prior ekg in May 2023 unchanged. Echo May 2023 acceptable EF.     Patient denies any symptoms (as per HPI) concerning for undiagnosed lung disease including  GLORIA. Would not recommend obtaining chest X-ray, sleep study, or PFTs at this time. Patient was counseled on the importance of quitting smoking ideally 8 weeks prior to surgery. We discussed the benefits of early mobilization and deep breathing after surgery.      Screened patient for alcohol misuse, use of illicit drugs, and personal or family history of anesthetic complications or bleeding diathesis and no substantial concerns were identified. None     All current medications were reviewed and at this time no changes to medications are recommended prior to surgery. Hold nsaid use.     I recommend use of standard pre-op and post-op precautions for this patient. In my opinion, she is medically optimized for this procedure, and can proceed without further evaluation.

## 2023-11-15 RX ORDER — LEVOCETIRIZINE DIHYDROCHLORIDE 5 MG/1
TABLET, FILM COATED ORAL
Qty: 30 TABLET | Refills: 3 | Status: SHIPPED | OUTPATIENT
Start: 2023-11-15 | End: 2024-03-27

## 2023-11-15 NOTE — TELEPHONE ENCOUNTER
Home going instructions reviewed and discussed, pt and family verbalize understanding. Educated on anesthesia safety.  Tolerated fluids well   Spoke with pt via phone. Scheduled next day visit, 5/25/2022. All understanding.

## 2023-11-24 ENCOUNTER — TELEPHONE (OUTPATIENT)
Dept: FAMILY MEDICINE | Facility: CLINIC | Age: 53
End: 2023-11-24
Payer: MEDICARE

## 2023-11-24 NOTE — TELEPHONE ENCOUNTER
"Allergy referral: spoke with patient. She has "too much going on now" to reschedule. If/when ready, verbally gave scheduling desk phone with correct repeat, she'll call.  "

## 2023-12-05 DIAGNOSIS — J06.9 UPPER RESPIRATORY TRACT INFECTION, UNSPECIFIED TYPE: ICD-10-CM

## 2023-12-06 DIAGNOSIS — J22 LOWER RESPIRATORY INFECTION (E.G., BRONCHITIS, PNEUMONIA, PNEUMONITIS, PULMONITIS): ICD-10-CM

## 2023-12-06 RX ORDER — POTASSIUM CHLORIDE 750 MG/1
TABLET, EXTENDED RELEASE ORAL
Qty: 30 TABLET | Refills: 2 | Status: SHIPPED | OUTPATIENT
Start: 2023-12-06 | End: 2024-03-07

## 2023-12-06 RX ORDER — FLUTICASONE PROPIONATE 50 MCG
SPRAY, SUSPENSION (ML) NASAL
Qty: 48 G | Refills: 1 | Status: SHIPPED | OUTPATIENT
Start: 2023-12-06 | End: 2024-03-23

## 2023-12-06 RX ORDER — ALBUTEROL SULFATE 90 UG/1
AEROSOL, METERED RESPIRATORY (INHALATION)
Qty: 18 G | Refills: 0 | Status: SHIPPED | OUTPATIENT
Start: 2023-12-06 | End: 2023-12-28

## 2023-12-06 RX ORDER — AZELASTINE 1 MG/ML
SPRAY, METERED NASAL
Qty: 90 ML | Refills: 1 | Status: SHIPPED | OUTPATIENT
Start: 2023-12-06

## 2023-12-08 ENCOUNTER — TELEPHONE (OUTPATIENT)
Dept: FAMILY MEDICINE | Facility: CLINIC | Age: 53
End: 2023-12-08
Payer: MEDICARE

## 2023-12-08 NOTE — TELEPHONE ENCOUNTER
----- Message from Tere Ball sent at 12/8/2023  7:51 AM CST -----  Regarding: Needs return call  Type: Needs Medical Advice  Who Called:  Alisson    Nick Call Back Number: [phone]    Additional Information: Pt said she feels like her head is going to explode and her face and head hurts so bad she can't sleep, she states she has a earache and sinusitis, Pt was trying to see if something can be called in for her

## 2023-12-08 NOTE — TELEPHONE ENCOUNTER
Called patient to schedule office visit due to current symptoms. No answer, left voicemail to return call.

## 2023-12-08 NOTE — TELEPHONE ENCOUNTER
Called patient to schedule office visit for current symptoms. Appointment successfully scheduled. Patient verbalized understanding.

## 2023-12-08 NOTE — TELEPHONE ENCOUNTER
----- Message from Ramon Arana sent at 12/8/2023 11:19 AM CST -----  Type: Needs Medical Advice  Who Called:  Patient    Pharmacy name and phone #:    [id number] - [place], [place] - [address]  [address]  [place] [address]  Phone: [phone] Fax: [phone]      Best Call Back Number:  [phone]  Additional Information: Patient states that her Z-Pack isn't at the pharmacy.  Please call to advise

## 2023-12-28 DIAGNOSIS — J22 LOWER RESPIRATORY INFECTION (E.G., BRONCHITIS, PNEUMONIA, PNEUMONITIS, PULMONITIS): ICD-10-CM

## 2023-12-28 RX ORDER — ALBUTEROL SULFATE 90 UG/1
AEROSOL, METERED RESPIRATORY (INHALATION)
Qty: 18 G | Refills: 0 | Status: SHIPPED | OUTPATIENT
Start: 2023-12-28 | End: 2024-01-23

## 2024-01-17 ENCOUNTER — NURSE TRIAGE (OUTPATIENT)
Dept: ADMINISTRATIVE | Facility: CLINIC | Age: 54
End: 2024-01-17
Payer: MEDICARE

## 2024-01-17 NOTE — TELEPHONE ENCOUNTER
Pt reports nasal symptoms, fever, earache, taking longer to start to urinate, and an ache below her Rt shoulder sometimes. Pt advised to see a MD within 24 hours per protocol via PCP office/UC/ED/ODVV, pt declined for an appt to be made at this time, plans to call the office in the morning instead. Pt encouraged to call back with any worsening symptoms or questions. Pt verbalized understanding.    Reason for Disposition   Fever present > 3 days (72 hours)    Additional Information   Negative: SEVERE difficulty breathing (e.g., struggling for each breath, speaks in single words)   Negative: Sounds like a life-threatening emergency to the triager   Negative: Fever > 104 F (40 C)   Negative: Patient sounds very sick or weak to the triager   Negative: [1] Fever > 101 F (38.3 C) AND [2] age > 60 years   Negative: [1] Fever > 100.0 F (37.8 C) AND [2] bedridden (e.g., CVA, chronic illness, recovering from surgery)   Negative: [1] Fever > 100.0 F (37.8 C) AND [2] diabetes mellitus or weak immune system (e.g., HIV positive, cancer chemo, splenectomy, organ transplant, chronic steroids)    Protocols used: Common Cold-A-AH

## 2024-01-18 DIAGNOSIS — J22 LOWER RESPIRATORY INFECTION (E.G., BRONCHITIS, PNEUMONIA, PNEUMONITIS, PULMONITIS): ICD-10-CM

## 2024-01-18 NOTE — TELEPHONE ENCOUNTER
Returned patients call in regards to needing a call back in regards to symptoms. No answer, left voicemail to return call.

## 2024-01-23 RX ORDER — ALBUTEROL SULFATE 90 UG/1
AEROSOL, METERED RESPIRATORY (INHALATION)
Qty: 18 G | Refills: 0 | Status: SHIPPED | OUTPATIENT
Start: 2024-01-23 | End: 2024-02-16

## 2024-01-30 ENCOUNTER — OFFICE VISIT (OUTPATIENT)
Dept: FAMILY MEDICINE | Facility: CLINIC | Age: 54
End: 2024-01-30
Payer: MEDICARE

## 2024-01-30 VITALS
SYSTOLIC BLOOD PRESSURE: 100 MMHG | TEMPERATURE: 98 F | BODY MASS INDEX: 20.6 KG/M2 | OXYGEN SATURATION: 98 % | HEIGHT: 60 IN | HEART RATE: 70 BPM | DIASTOLIC BLOOD PRESSURE: 60 MMHG | WEIGHT: 104.94 LBS

## 2024-01-30 DIAGNOSIS — R43.2 LOSS OF TASTE: ICD-10-CM

## 2024-01-30 DIAGNOSIS — R05.9 COUGH, UNSPECIFIED TYPE: Primary | ICD-10-CM

## 2024-01-30 DIAGNOSIS — M94.0 COSTOCHONDRITIS: ICD-10-CM

## 2024-01-30 PROCEDURE — 99213 OFFICE O/P EST LOW 20 MIN: CPT | Mod: PBBFAC,PO | Performed by: NURSE PRACTITIONER

## 2024-01-30 PROCEDURE — 87502 INFLUENZA DNA AMP PROBE: CPT | Mod: PBBFAC,PO | Performed by: NURSE PRACTITIONER

## 2024-01-30 PROCEDURE — 99999PBSHW: Mod: PBBFAC,,,

## 2024-01-30 PROCEDURE — 99999 PR PBB SHADOW E&M-EST. PATIENT-LVL III: CPT | Mod: PBBFAC,,, | Performed by: NURSE PRACTITIONER

## 2024-01-30 PROCEDURE — 87635 SARS-COV-2 COVID-19 AMP PRB: CPT | Mod: PBBFAC,PO | Performed by: NURSE PRACTITIONER

## 2024-01-30 PROCEDURE — 99999PBSHW POCT INFLUENZA A/B MOLECULAR: Mod: PBBFAC,,,

## 2024-01-30 PROCEDURE — 99214 OFFICE O/P EST MOD 30 MIN: CPT | Mod: S$PBB,,, | Performed by: NURSE PRACTITIONER

## 2024-01-30 RX ORDER — PREDNISONE 10 MG/1
10 TABLET ORAL 2 TIMES DAILY
Qty: 20 TABLET | Refills: 0 | Status: SHIPPED | OUTPATIENT
Start: 2024-01-30 | End: 2024-02-09

## 2024-01-30 RX ORDER — CEFDINIR 300 MG/1
300 CAPSULE ORAL 2 TIMES DAILY
Qty: 20 CAPSULE | Refills: 0 | Status: SHIPPED | OUTPATIENT
Start: 2024-01-30 | End: 2024-02-09

## 2024-01-30 NOTE — PROGRESS NOTES
This dictation has been generated using Modal Fluency Dictation some phonetic errors may occur. Please contact author for clarification if needed.     Problem List Items Addressed This Visit    None  Visit Diagnoses       Cough, unspecified type    -  Primary    Relevant Orders    POCT COVID-19 Rapid Screening (Completed)    POCT Influenza A/B Molecular (Completed)    Costochondritis        Loss of taste        Relevant Orders    POCT COVID-19 Rapid Screening (Completed)            Orders Placed This Encounter    POCT COVID-19 Rapid Screening    POCT Influenza A/B Molecular    predniSONE (DELTASONE) 10 MG tablet    cefdinir (OMNICEF) 300 MG capsule       COVID and flu negative  Costochondritis and bacterial bronchitis Omnicef and Deltasone as above    No follow-ups on file.    ________________________________________________________________  ________________________________________________________________      Chief Complaint   Patient presents with    Sore Throat     History of present illness  This 53 y.o. presents today for complaint of cough chest wall pain and loss of taste.  No known exposure.  Symptoms present for more than 2 weeks.  Notes chest wall pain with movement and touch.  Indicates left side of her chest wall.  Patient denies coughing up blood.  No nausea vomiting diarrhea.  Patient denies presence of rash.  She notes loss of taste.  No loss of smell.  Unfortunately she does continue to smoke but has decreased number of cigarettes due to being ill.  Review of systems  No extremity swelling.    Past Medical History:   Diagnosis Date    Abnormal uterine and vaginal bleeding, unspecified 10/29/2018    Allergy     Arthritis     Asthma 1989    Cancer 1991    cervical    CHF (congestive heart failure)     one episode, no follow up    Chronic back pain 1990    severe scoliosis    Depression     GERD (gastroesophageal reflux disease)     History of psychiatric care     History of psychiatric hospitalization      Menorrhagia with regular cycle 10/11/2018    Migraine     Primary dysmenorrhea 10/11/2018    Psychiatric exam requested by authority     Psychiatric problem     Scoliosis 1978    Therapy        Past Surgical History:   Procedure Laterality Date    APPENDECTOMY  1991    COLPOSCOPY      EPIDURAL STEROID INJECTION INTO LUMBAR SPINE      HYSTERECTOMY  2018    LAPAROSCOPIC CHOLECYSTECTOMY WITH CHOLANGIOGRAPHY N/A 3/11/2021    Procedure: CHOLECYSTECTOMY, LAPAROSCOPIC, WITH CHOLANGIOGRAM;  Surgeon: Deshawn Mccracken III, MD;  Location: [facility] OR;  Service: General;  Laterality: N/A;    LAPAROSCOPIC SALPINGO-OOPHORECTOMY  10/29/2018    Procedure: SALPINGO-OOPHORECTOMY, LAPAROSCOPIC;  Surgeon: Matthew Rene MD;  Location: [facility] OR;  Service: OB/GYN;;    LAPAROSCOPIC TOTAL HYSTERECTOMY N/A 10/29/2018    Procedure: HYSTERECTOMY, TOTAL, LAPAROSCOPIC;  Surgeon: Matthew Rene MD;  Location: [facility] OR;  Service: OB/GYN;  Laterality: N/A;    MULTIPLE TOOTH EXTRACTIONS      NASAL SEPTOPLASTY Bilateral 9/25/2020    Procedure: SEPTOPLASTY, NASAL;  Surgeon: Surinder Bobby MD;  Location: [facility] OR;  Service: ENT;  Laterality: Bilateral;    NASAL TURBINATE REDUCTION Bilateral 9/25/2020    Procedure: REDUCTION, NASAL TURBINATE;  Surgeon: Surinder Bobby MD;  Location: [facility] OR;  Service: ENT;  Laterality: Bilateral;    RHIZOTOMY W/ RADIOFREQUENCY ABLATION Bilateral     UMBILICAL HERNIA REPAIR N/A 2/21/2022    Procedure: REPAIR, HERNIA, UMBILICAL, AGE 5 YEARS OR OLDER;  Surgeon: Deshawn Mccracken III, MD;  Location: [facility] OR;  Service: General;  Laterality: N/A;       Family History   Adopted: Yes   Problem Relation Age of Onset    Breast cancer Mother     Breast cancer Maternal Grandmother        Social History     Socioeconomic History    Marital status: Legally    Tobacco Use    Smoking status: Every Day     Current packs/day: 1.00     Average packs/day: 1 pack/day for 40.0 years (40.0 ttl pk-yrs)     Types: Cigarettes     Smokeless tobacco: Never    Tobacco comments:     quit cold turkey paramjit for 5 years with no difficulty.   Substance and Sexual Activity    Alcohol use: No    Drug use: No    Sexual activity: Yes     Partners: Male     Birth control/protection: None   Other Topics Concern    Patient feels they ought to cut down on drinking/drug use No    Patient annoyed by others criticizing their drinking/drug use Yes    Patient has felt bad or guilty about drinking/drug use No    Patient has had a drink/used drugs as an eye opener in the AM No       Current Outpatient Medications   Medication Sig Dispense Refill    albuterol (PROVENTIL/VENTOLIN HFA) 90 mcg/actuation inhaler INHALE 2 PUFFS INTO THE LUNGS EVERY 6 HOURS AS NEEDED FOR WHEEZING 18 g 0    azelastine (ASTELIN) 137 mcg (0.1 %) nasal spray INSTILL 1 SPRAY IN EACH NOSTRIL TWICE DAILY 90 mL 1    fluticasone propionate (FLONASE) 50 mcg/actuation nasal spray SHAKE LIQUID AND USE 2 SPRAYS(100 MCG) IN EACH NOSTRIL EVERY DAY 48 g 1    fluticasone propionate (FLOVENT DISKUS) 100 mcg/actuation inhaler Inhale 2 puffs (200 mcg total) into the lungs 2 (two) times daily. Controller 120 each 11    gabapentin (NEURONTIN) 800 MG tablet Take 800 mg by mouth 3 (three) times daily.      ibuprofen (ADVIL,MOTRIN) 800 MG tablet Take 800 mg by mouth.      levocetirizine (XYZAL) 5 MG tablet TAKE 1 TABLET(5 MG) BY MOUTH EVERY EVENING 30 tablet 3    multivitamin capsule Take 1 capsule by mouth once daily.      omeprazole (PRILOSEC) 40 MG capsule TAKE 1 CAPSULE(40 MG) BY MOUTH TWICE DAILY BEFORE MEALS 180 capsule 1    ondansetron (ZOFRAN) 8 MG tablet Take 8 mg by mouth 2 (two) times daily as needed.      oxyCODONE-acetaminophen (PERCOCET)  mg per tablet Take 1 tablet by mouth 4 (four) times daily as needed.      potassium chloride SA (K-DUR,KLOR-CON M) 10 MEQ tablet TAKE 2 TABLETS(20 MEQ) BY MOUTH EVERY DAY 30 tablet 2    QUEtiapine (SEROQUEL) 50 MG tablet Take 50 mg by mouth nightly as  needed.      tiZANidine (ZANAFLEX) 4 MG tablet Take 4 mg by mouth every 6 (six) hours as needed.      cefdinir (OMNICEF) 300 MG capsule Take 1 capsule (300 mg total) by mouth 2 (two) times daily. for 10 days 20 capsule 0    predniSONE (DELTASONE) 10 MG tablet Take 1 tablet (10 mg total) by mouth 2 (two) times daily. for 10 days 20 tablet 0     No current facility-administered medications for this visit.       Review of patient's allergies indicates:   Allergen Reactions    Codeine Nausea And Vomiting     If taken with tylenol    Doxycycline      Makes symptoms worse    Skelaxin [metaxalone] Other (See Comments)     Burning in throat    Zoloft [sertraline] Rash       Physical examination  Vitals Reviewed  /60 (BP Location: Right arm, Patient Position: Sitting, BP Method: Medium (Manual))   Pulse 70   Temp 98.3 °F (36.8 °C) (Oral)   Ht 5' (1.524 m)   Wt 47.6 kg (104 lb 15 oz)   LMP 10/08/2018   SpO2 98%   BMI 20.49 kg/m²  Body mass index is 20.49 kg/m².     BP Readings from Last 3 Encounters:   01/30/24 100/60   11/09/23 102/62   09/29/23 (!) 98/58       Wt Readings from Last 3 Encounters:   01/30/24 47.6 kg (104 lb 15 oz)   11/09/23 45.6 kg (100 lb 8.5 oz)   09/29/23 46 kg (101 lb 6.6 oz)     Gen. Well-dressed well-nourished   Skin warm dry and intact.  No rashes noted.  HEENT.  TM intact bilateral with normal light reflex.  No mastoid tenderness during percussion.  Nares patent bilateral.  Pharynx is unremarkable except postnasal drip.  No maxillary or frontal sinus tenderness when percussed.    Neck is supple without adenopathy  Chest.  Respirations are even unlabored.  Lungs are clear to auscultation.  Cardiac regular rate and rhythm.  No chest wall adenopathy noted. Chest wall tender. No rash.   Neuro. Awake alert oriented x4.  Normal judgment and cognition noted.  Extremities no clubbing cyanosis or edema noted.     Call or return to clinic prn if these symptoms worsen or fail to improve as  anticipated.

## 2024-02-06 DIAGNOSIS — Z12.11 COLON CANCER SCREENING: ICD-10-CM

## 2024-02-14 ENCOUNTER — TELEPHONE (OUTPATIENT)
Dept: FAMILY MEDICINE | Facility: CLINIC | Age: 54
End: 2024-02-14

## 2024-02-14 NOTE — TELEPHONE ENCOUNTER
Returned patients call in regards to facial swelling on left side of face. States it felt like something was in her eyes last night and she washed her face and it has been swollen since. States she has a temp of 100.9 for the past two days. She is taking mucinex and tylenol. Tylenol is not helping with the swelling. She is taking the xyzal at night and not sure if she should she take benadryl with it.   No dizziness, no SOB, no nausea/vomiting, no other swelling , coughing up mucus light green in color. Would like your recommendations. Recommended an appointment, states she does not have transportation for the next few days.   Will forward message to provider for further assistance.

## 2024-02-14 NOTE — TELEPHONE ENCOUNTER
----- Message from Monserrat Curry sent at 2/14/2024  3:21 PM CST -----  Contact: patient  Type:  Needs Medical Advice    Who Called: patient     Would the patient rather a call back or a response via [facility]? Call     Best Call Back Number: [phone] (home)      Additional Information: Patient would like to speak with the nurse in regards to left side of face being swollen.     Please call to advise

## 2024-02-15 DIAGNOSIS — J22 LOWER RESPIRATORY INFECTION (E.G., BRONCHITIS, PNEUMONIA, PNEUMONITIS, PULMONITIS): ICD-10-CM

## 2024-02-16 ENCOUNTER — TELEPHONE (OUTPATIENT)
Dept: FAMILY MEDICINE | Facility: CLINIC | Age: 54
End: 2024-02-16
Payer: MEDICARE

## 2024-02-16 RX ORDER — ALBUTEROL SULFATE 90 UG/1
AEROSOL, METERED RESPIRATORY (INHALATION)
Qty: 18 G | Refills: 0 | Status: SHIPPED | OUTPATIENT
Start: 2024-02-16 | End: 2024-03-23

## 2024-02-16 NOTE — TELEPHONE ENCOUNTER
Spoke with patient, she is still not feeling well, per Elmer I advised patient to continue current treatment.

## 2024-03-07 RX ORDER — OMEPRAZOLE 40 MG/1
CAPSULE, DELAYED RELEASE ORAL
Qty: 180 CAPSULE | Refills: 1 | Status: SHIPPED | OUTPATIENT
Start: 2024-03-07

## 2024-03-07 RX ORDER — POTASSIUM CHLORIDE 750 MG/1
TABLET, EXTENDED RELEASE ORAL
Qty: 30 TABLET | Refills: 2 | Status: SHIPPED | OUTPATIENT
Start: 2024-03-07

## 2024-03-11 ENCOUNTER — TELEPHONE (OUTPATIENT)
Dept: FAMILY MEDICINE | Facility: CLINIC | Age: 54
End: 2024-03-11
Payer: MEDICARE

## 2024-03-11 NOTE — TELEPHONE ENCOUNTER
Called and spoke to pt about the change with Mr Lexx Puentes's practice  Pcp list given to pt and she will call back with decision

## 2024-03-14 NOTE — PATIENT INSTRUCTIONS
Sinusitis (Antibiotic Treatment)    The sinuses are air-filled spaces within the bones of the face. They connect to the inside of the nose. Sinusitis is an inflammation of the tissue lining the sinus cavity. Sinus inflammation can occur during a cold. It can also be due to allergies to pollens and other particles in the air. Sinusitis can cause symptoms of sinus congestion and fullness. A sinus infection causes fever, headache and facial pain. There is often green or yellow drainage from the nose or into the back of the throat (post-nasal drip). You have been given antibiotics to treat this condition.  Home care:  · Take the full course of antibiotics as instructed. Do not stop taking them, even if you feel better.  · Drink plenty of water, hot tea, and other liquids. This may help thin mucus. It also may promote sinus drainage.  · Heat may help soothe painful areas of the face. Use a towel soaked in hot water. Or,  the shower and direct the hot spray onto your face. Using a vaporizer along with a menthol rub at night may also help.   · An expectorant containing guaifenesin may help thin the mucus and promote drainage from the sinuses.  · Over-the-counter decongestants may be used unless a similar medicine was prescribed. Nasal sprays work the fastest. Use one that contains phenylephrine or oxymetazoline. First blow the nose gently. Then use the spray. Do not use these medicines more often than directed on the label or symptoms may get worse. You may also use tablets containing pseudoephedrine. Avoid products that combine ingredients, because side effects may be increased. Read labels. You can also ask the pharmacist for help. (NOTE: Persons with high blood pressure should not use decongestants. They can raise blood pressure.)  · Over-the-counter antihistamines may help if allergies contributed to your sinusitis.    · Do not use nasal rinses or irrigation during an acute sinus infection, unless told to by  your health care provider. Rinsing may spread the infection to other sinuses.  · Use acetaminophen or ibuprofen to control pain, unless another pain medicine was prescribed. (If you have chronic liver or kidney disease or ever had a stomach ulcer, talk with your doctor before using these medicines. Aspirin should never be used in anyone under 18 years of age who is ill with a fever. It may cause severe liver damage.)  · Don't smoke. This can worsen symptoms.  Follow-up care  Follow up with your healthcare provider or our staff if you are not improving within the next week.  When to seek medical advice  Call your healthcare provider if any of these occur:  · Facial pain or headache becoming more severe  · Stiff neck  · Unusual drowsiness or confusion  · Swelling of the forehead or eyelids  · Vision problems, including blurred or double vision  · Fever of 100.4ºF (38ºC) or higher, or as directed by your healthcare provider  · Seizure  · Breathing problems  · Symptoms not resolving within 10 days  Date Last Reviewed: 4/13/2015  © 8448-0429 [employer]. [address] [place] [address], [place] [address]. All rights reserved. This information is not intended as a substitute for professional medical care. Always follow your healthcare professional's instructions.         [Ear Pain] : ear pain [Negative] : Genitourinary

## 2024-03-21 DIAGNOSIS — J22 LOWER RESPIRATORY INFECTION (E.G., BRONCHITIS, PNEUMONIA, PNEUMONITIS, PULMONITIS): ICD-10-CM

## 2024-03-23 RX ORDER — ALBUTEROL SULFATE 90 UG/1
AEROSOL, METERED RESPIRATORY (INHALATION)
Qty: 18 G | Refills: 0 | Status: SHIPPED | OUTPATIENT
Start: 2024-03-23 | End: 2024-04-23

## 2024-03-23 RX ORDER — FLUTICASONE PROPIONATE 50 MCG
SPRAY, SUSPENSION (ML) NASAL
Qty: 48 G | Refills: 1 | Status: SHIPPED | OUTPATIENT
Start: 2024-03-23

## 2024-03-27 RX ORDER — LEVOCETIRIZINE DIHYDROCHLORIDE 5 MG/1
TABLET, FILM COATED ORAL
Qty: 30 TABLET | Refills: 3 | Status: SHIPPED | OUTPATIENT
Start: 2024-03-27

## 2024-04-20 DIAGNOSIS — J22 LOWER RESPIRATORY INFECTION (E.G., BRONCHITIS, PNEUMONIA, PNEUMONITIS, PULMONITIS): ICD-10-CM

## 2024-04-23 RX ORDER — ALBUTEROL SULFATE 90 UG/1
AEROSOL, METERED RESPIRATORY (INHALATION)
Qty: 18 G | Refills: 0 | Status: SHIPPED | OUTPATIENT
Start: 2024-04-23 | End: 2024-05-31 | Stop reason: SDUPTHER

## 2024-05-21 NOTE — PROGRESS NOTES
Pre-Visit Chart Review  For Appointment Scheduled on 02/27/19    Health Maintenance Due   Topic Date Due    Lipid Panel  1969    TETANUS VACCINE  08/20/1987    Pneumococcal Vaccine (Medium Risk) (1 of 1 - PPSV23) 08/20/1988    Influenza Vaccine  08/01/2018                      Bariatric Diet Phase 1 (Liquids)

## 2024-05-31 DIAGNOSIS — J22 LOWER RESPIRATORY INFECTION (E.G., BRONCHITIS, PNEUMONIA, PNEUMONITIS, PULMONITIS): ICD-10-CM

## 2024-05-31 RX ORDER — ALBUTEROL SULFATE 90 UG/1
2 AEROSOL, METERED RESPIRATORY (INHALATION) EVERY 6 HOURS PRN
Qty: 18 G | Refills: 0 | Status: SHIPPED | OUTPATIENT
Start: 2024-05-31

## 2024-07-05 RX ORDER — POTASSIUM CHLORIDE 750 MG/1
TABLET, EXTENDED RELEASE ORAL
Qty: 30 TABLET | Refills: 2 | Status: SHIPPED | OUTPATIENT
Start: 2024-07-05

## 2024-07-22 RX ORDER — LEVOCETIRIZINE DIHYDROCHLORIDE 5 MG/1
TABLET, FILM COATED ORAL
Qty: 30 TABLET | Refills: 3 | Status: SHIPPED | OUTPATIENT
Start: 2024-07-22

## 2024-08-27 RX ORDER — FLUTICASONE PROPIONATE 50 MCG
SPRAY, SUSPENSION (ML) NASAL
Qty: 48 G | Refills: 1 | Status: SHIPPED | OUTPATIENT
Start: 2024-08-27

## 2024-11-21 RX ORDER — OMEPRAZOLE 40 MG/1
CAPSULE, DELAYED RELEASE ORAL
Qty: 180 CAPSULE | Refills: 1 | Status: SHIPPED | OUTPATIENT
Start: 2024-11-21

## 2024-12-02 ENCOUNTER — TELEPHONE (OUTPATIENT)
Dept: FAMILY MEDICINE | Facility: CLINIC | Age: 54
End: 2024-12-02
Payer: MEDICARE

## 2024-12-02 NOTE — TELEPHONE ENCOUNTER
Attempted to contact patient to advise that Elmer is no longer in family practice and will not be able to refill medication. Patient needs to establish care with PCP. Phone number is invalid.  
IV

## 2024-12-09 RX ORDER — LEVOCETIRIZINE DIHYDROCHLORIDE 5 MG/1
TABLET, FILM COATED ORAL
Qty: 30 TABLET | Refills: 3 | OUTPATIENT
Start: 2024-12-09

## 2024-12-09 NOTE — TELEPHONE ENCOUNTER
Attempted to contact patient to advise that Elmer is no longer in Family Practice, she needs to establish care with a PCP.

## 2024-12-23 RX ORDER — POTASSIUM CHLORIDE 750 MG/1
TABLET, EXTENDED RELEASE ORAL
Qty: 30 TABLET | Refills: 2 | OUTPATIENT
Start: 2024-12-23

## 2024-12-23 RX ORDER — POTASSIUM CHLORIDE 750 MG/1
TABLET, EXTENDED RELEASE ORAL
Qty: 30 TABLET | Refills: 2 | Status: CANCELLED | OUTPATIENT
Start: 2024-12-23

## 2024-12-23 NOTE — TELEPHONE ENCOUNTER
Attempted to contact patient in regards to refill, voicemail not set up, and not active on portal.

## 2024-12-23 NOTE — TELEPHONE ENCOUNTER
Refill Routing Note   Medication(s) are not appropriate for processing by [facility] for the following reason(s):        Responsible provider unclear    [facility] action(s):  Route      Medication Therapy Plan: PCP IS NOT ACTIVE WITH [facility], DEFER TO YOU FOR REVIEW;  PLEASE ADVISE      Appointments  past 12m or future 3m with PCP    Date Provider   Last Visit   Visit date not found No, Primary Doctor   Next Visit   Visit date not found No, Primary Doctor   ED visits in past 90 days: 0        Note composed:11:44 AM 12/23/2024

## (undated) DEVICE — BAG TISS RETRV MONARCH 10MM

## (undated) DEVICE — APPLICATOR ARISTA FLEX XL

## (undated) DEVICE — KIT URINE METER 350ML STAT LOC

## (undated) DEVICE — ENDOSTITCH POLYSORB 0 ES-9

## (undated) DEVICE — TIP RUMI MANIPULATOR LAVENDER

## (undated) DEVICE — GLOVE PROTEXIS HYDROGEL SZ7

## (undated) DEVICE — SEE MEDLINE ITEM 146313

## (undated) DEVICE — SUT 4/0 18IN GUT PLAINPS-2

## (undated) DEVICE — ELECTRODE REM PLYHSV RETURN 9

## (undated) DEVICE — SOL IRR STRL WATER 500ML

## (undated) DEVICE — SOLUTION IRRI NS BOTTLE 1000ML R5200-01

## (undated) DEVICE — SEE MEDLINE ITEM 152487

## (undated) DEVICE — TUBING PNEUMO

## (undated) DEVICE — NEPTUNE 4 PORT MANIFOLD

## (undated) DEVICE — KIT ANTIFOG

## (undated) DEVICE — LEGGINGS 48X31 INCH

## (undated) DEVICE — Device

## (undated) DEVICE — SUT PLN GUT 4-0 SC-1SC-1 1

## (undated) DEVICE — SCISSOR 5MMX35CM DIRECT DRIVE

## (undated) DEVICE — SPLINT NASAL AIRWAY SEPTAL SIL

## (undated) DEVICE — NDL INSUF ULTRA VERESS 120MM

## (undated) DEVICE — BANDAGE ADHESIVE

## (undated) DEVICE — DERMABOND HVD MINI  DHVM12

## (undated) DEVICE — SUTURE VICRYL 3-0 18 VCP774D

## (undated) DEVICE — SEE L#120831

## (undated) DEVICE — SUT CTD VICRYL 0 UND BR CT

## (undated) DEVICE — EVACUATOR KIT SMOKE PLUME AWAY

## (undated) DEVICE — UNDERGLOVES BIOGEL PI SIZE 8

## (undated) DEVICE — SOL WATER STRL IRR 1000ML

## (undated) DEVICE — STRAP OR TABLE 5IN X 72IN

## (undated) DEVICE — DRAPE TIBURON 36X104X123IN

## (undated) DEVICE — GLOVE BIOGEL MICRO SURGEON PINK SZ 7.5

## (undated) DEVICE — PAD K-THERMIA 24IN X 60IN

## (undated) DEVICE — SEE MEDLINE ITEM 157181

## (undated) DEVICE — CANNULA LAP SEAL Z THRD 5X100

## (undated) DEVICE — BLADE TRICUT 3.5MM

## (undated) DEVICE — SUT MONOCRYL 4-0 PS-2

## (undated) DEVICE — TROCAR KII BLLN 12MM 10CM

## (undated) DEVICE — SWABSTICK BENZOIN 4 IN

## (undated) DEVICE — TROCAR KII FIOS 5MM X 100MM

## (undated) DEVICE — SUTURE ETHIBOND 0 MO-6 18 CX45D

## (undated) DEVICE — APPLIER CLIP EPIX UNIV 5X34

## (undated) DEVICE — SYR 50CC LL

## (undated) DEVICE — POWDER ARISTA AH 3G

## (undated) DEVICE — CHLORAPREP W TINT 26ML APPL

## (undated) DEVICE — GLOVE SURG BIOGEL LATEX SZ 7.5

## (undated) DEVICE — SUTURE MONOCRYL 4-0 PS-2 27 MCP426H

## (undated) DEVICE — GOWN SURGICAL X-LARGE

## (undated) DEVICE — SYR 10CC LUER LOCK

## (undated) DEVICE — IRRIGATOR ENDOSCOPY DISP.

## (undated) DEVICE — PAD BOVIE ADULT

## (undated) DEVICE — TROCAR ENDOPATH XCEL 11MM 10CM

## (undated) DEVICE — SHEARS HARMONIC 5CM 36CM

## (undated) DEVICE — SEE MEDLINE ITEM 154981

## (undated) DEVICE — TROCAR ENDOPATH XCEL 5MM 7.5CM

## (undated) DEVICE — GLOVE SURGICAL LATEX SZ 7

## (undated) DEVICE — NDL HYPO 27G X 1 1/2

## (undated) DEVICE — SUT ETHIBOND EXCEL 0 MO6 18

## (undated) DEVICE — SUTURE SILK 2-0 30 A305H

## (undated) DEVICE — UNDERGLOVE BIOGEL PI MICRO BLUE SZ 8

## (undated) DEVICE — SUT 2-0 VICRYL / SH (J417)

## (undated) DEVICE — SOL IRR NACL .9% 3000ML

## (undated) DEVICE — BLADE SURG CARBON STEEL SZ11

## (undated) DEVICE — SHEET EENT SPLIT

## (undated) DEVICE — SUCTION COAGULATOR 10FR 6IN

## (undated) DEVICE — SUTURE SILK 3-0 30 A304H

## (undated) DEVICE — SYRINGE HYPODERMC LL 22G 1.5 ECLIPSE 30

## (undated) DEVICE — ENDOSTITCH INSTRUMENT

## (undated) DEVICE — SPONGE PATTY SURGICAL .5X3IN

## (undated) DEVICE — SOL 9P NACL IRR PIC IL

## (undated) DEVICE — PACK CUSTOM ENDO CHOLO SLI

## (undated) DEVICE — GLOVE SURG ULTRA TOUCH 7.5

## (undated) DEVICE — TROCAR ENDOPATH XCEL 5X75MM

## (undated) DEVICE — PADS MAXI GARD

## (undated) DEVICE — SUTURE VICRYL 3-0 SH 27 VCP416H

## (undated) DEVICE — ADHESIVE DERMABOND ADVANCED

## (undated) DEVICE — TRAY GENERAL SURGERY

## (undated) DEVICE — SEE MEDLINE ITEM 152622

## (undated) DEVICE — SET TUBE PNEUMOCLEAR SE HI FLO

## (undated) DEVICE — CLOSURE SKIN STERI STRIP 1/2X4

## (undated) DEVICE — SEE MEDLINE ITEM 157117

## (undated) DEVICE — SUT ETHILON 2-0 BLK MONO PS

## (undated) DEVICE — SLEEVE SCD EXPRESS KNEE MEDIUM

## (undated) DEVICE — DRAPE WARMER ORS-100

## (undated) DEVICE — SUTURE PDS PLUS 0 L60IN ABSORBABLE VIOLET CTX L48MM 1/2 CIRC

## (undated) DEVICE — DEVICE CLOSURE DISP 14G